# Patient Record
Sex: FEMALE | Race: WHITE | NOT HISPANIC OR LATINO | Employment: OTHER | URBAN - METROPOLITAN AREA
[De-identification: names, ages, dates, MRNs, and addresses within clinical notes are randomized per-mention and may not be internally consistent; named-entity substitution may affect disease eponyms.]

---

## 2017-06-17 ENCOUNTER — HOSPITAL ENCOUNTER (EMERGENCY)
Facility: HOSPITAL | Age: 77
Discharge: HOME | End: 2017-06-17
Attending: SURGERY
Payer: MEDICARE

## 2017-06-17 VITALS
HEART RATE: 65 BPM | WEIGHT: 175 LBS | BODY MASS INDEX: 27.47 KG/M2 | SYSTOLIC BLOOD PRESSURE: 168 MMHG | DIASTOLIC BLOOD PRESSURE: 74 MMHG | TEMPERATURE: 98 F | HEIGHT: 67 IN
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
## 2017-06-17 PROCEDURE — 96372 THER/PROPH/DIAG INJ SC/IM: CPT

## 2017-06-17 PROCEDURE — 99284 EMERGENCY DEPT VISIT MOD MDM: CPT | Mod: 25

## 2017-06-17 PROCEDURE — 63600175 PHARM REV CODE 636 W HCPCS: Performed by: SURGERY

## 2017-06-17 PROCEDURE — 25000003 PHARM REV CODE 250: Performed by: SURGERY

## 2017-06-17 RX ORDER — CYCLOBENZAPRINE HCL 10 MG
10 TABLET ORAL 3 TIMES DAILY PRN
Qty: 10 TABLET | Refills: 0 | Status: SHIPPED | OUTPATIENT
Start: 2017-06-17 | End: 2017-06-22

## 2017-06-17 RX ORDER — HYDROCODONE BITARTRATE AND ACETAMINOPHEN 5; 325 MG/1; MG/1
1 TABLET ORAL
Status: COMPLETED | OUTPATIENT
Start: 2017-06-17 | End: 2017-06-17

## 2017-06-17 RX ORDER — KETOROLAC TROMETHAMINE 30 MG/ML
30 INJECTION, SOLUTION INTRAMUSCULAR; INTRAVENOUS
Status: COMPLETED | OUTPATIENT
Start: 2017-06-17 | End: 2017-06-17

## 2017-06-17 RX ORDER — KETOROLAC TROMETHAMINE 10 MG/1
10 TABLET, FILM COATED ORAL EVERY 6 HOURS PRN
Qty: 15 TABLET | Refills: 0 | OUTPATIENT
Start: 2017-06-17 | End: 2020-08-27

## 2017-06-17 RX ADMIN — HYDROCODONE BITARTRATE AND ACETAMINOPHEN 1 TABLET: 5; 325 TABLET ORAL at 03:06

## 2017-06-17 RX ADMIN — KETOROLAC TROMETHAMINE 30 MG: 30 INJECTION, SOLUTION INTRAMUSCULAR at 03:06

## 2017-06-17 NOTE — ED PROVIDER NOTES
[facility]                                        June 17, 2017                   Chief Complaint  76 y.o. female with Fall (tripped and fell, states she hurts all over)    History of Present Illness  Giselle Rios presents to the emergency room with left hip pain today  Patient had a slip and fall last night with a headache and left hip pain after  Patient on exam has a normal left hip with no bruising or leg shortening now  Patient has good distal pulses and capillary refill, neurovascularly intact now  Pt denies any loss of consciousness; alert and oriented ×3 with GCS 15  Patient is able ambulate without difficulty, stable on presentation now    The history is provided by the patient  Medical history: HLD, hypothyroidism, obesity  Surgical history: Appendectomy, cervical fusion, hysterectomy  No Known Allergies     Review of Systems and Physical Exam     Review of Systems  -- Constitution - no fever, denies fatigue, no weakness, no chills  -- Eyes - no tearing or redness, no visual disturbance  -- Ear, Nose - no tinnitus or earache, no nasal congestion or discharge  -- Mouth,Throat - no sore throat, no toothache, normal voice, normal swallowing  -- Respiratory - denies cough and congestion, no shortness of breath, no ALANIS  -- Cardiovascular - denies chest pain, no palpitations, denies claudication  -- Gastrointestinal - denies abdominal pain, nausea, vomiting, or diarrhea  -- Genitourinary - no dysuria, no denies flank pain, no hematuria or frequency   -- Musculoskeletal - left hip pain after the fall yesterday  -- Neurological - headache, denies weakness or seizure; no LOC  -- Skin - denies pallor, rash, or changes in skin. no hives or welts noted    Vital Signs  -- Her oral temperature is 97.8 °F (36.6 °C).   -- Her blood pressure is 168/74 and her pulse is 65.      Physical Exam  -- Nursing note and vitals reviewed  -- Head: Atraumatic. Normocephalic. No obvious abnormality  -- Eyes:  Pupils are equal and reactive to light. Normal conjunctiva and lids  -- Neck: Normal range of motion. Neck supple. No masses, trachea midline  -- Cardiac: Normal rate, regular rhythm and normal heart sounds  -- Pulmonary: Normal respiratory effort, breath sounds clear to auscultation  -- Abdominal: Soft, no tenderness. Normal bowel sounds. Normal liver edge  -- Musculoskeletal: Normal range of motion, no effusions. Joints stable   -- Neurological: No focal deficits. Showed good interaction with staff  -- Vascular: Posterior tibial, dorsalis pedis and radial pulses 2+ bilaterally      Emergency Room Course     Treatment and Evaluation  -- The CT of the head performed in the ER today was negative for acute pathology   -- C-spine x-rays showed no evidence of acute fracture or dislocation   -- Chest x-ray showed no infiltrate and showed no acute pathology   -- Left hip and pelvis x-ray showed no evidence of fracture or dislocation      Diagnosis  -- The encounter diagnosis was Fall.    Disposition and Plan  -- Disposition: home  -- Condition: stable  -- Follow-up: Patient to follow up with Rina Melara MD in 1-2 days.  -- I advised the patient that we have found no life threatening condition today  -- At this time, I believe the patient is clinically stable for discharge.   -- The patient acknowledges that close follow up with a MD is required   -- Patient agrees to comply with all instruction and direction given in the ER    This note is dictated on Dragon Natural Speaking word recognition program.  There are word recognition mistakes that are occasionally missed on review.           Les Livingston MD  06/17/17 7445

## 2017-06-27 ENCOUNTER — LAB VISIT (OUTPATIENT)
Dept: LAB | Facility: HOSPITAL | Age: 77
End: 2017-06-27
Attending: FAMILY MEDICINE
Payer: MEDICARE

## 2017-06-27 DIAGNOSIS — E03.9 UNSPECIFIED HYPOTHYROIDISM: ICD-10-CM

## 2017-06-27 DIAGNOSIS — E78.00 PURE HYPERCHOLESTEROLEMIA: Primary | ICD-10-CM

## 2017-06-27 LAB
ALBUMIN SERPL BCP-MCNC: 3.5 G/DL
ALP SERPL-CCNC: 71 U/L
ALT SERPL W/O P-5'-P-CCNC: 14 U/L
ANION GAP SERPL CALC-SCNC: 8 MMOL/L
AST SERPL-CCNC: 23 U/L
BILIRUB SERPL-MCNC: 0.4 MG/DL
BUN SERPL-MCNC: 20 MG/DL
CALCIUM SERPL-MCNC: 9.4 MG/DL
CHLORIDE SERPL-SCNC: 107 MMOL/L
CHOLEST/HDLC SERPL: 4.7 {RATIO}
CO2 SERPL-SCNC: 30 MMOL/L
CREAT SERPL-MCNC: 0.8 MG/DL
ERYTHROCYTE [DISTWIDTH] IN BLOOD BY AUTOMATED COUNT: 12.9 %
EST. GFR  (AFRICAN AMERICAN): >60 ML/MIN/1.73 M^2
EST. GFR  (NON AFRICAN AMERICAN): >60 ML/MIN/1.73 M^2
GLUCOSE SERPL-MCNC: 91 MG/DL
HCT VFR BLD AUTO: 41.2 %
HDL/CHOLESTEROL RATIO: 21.2 %
HDLC SERPL-MCNC: 217 MG/DL
HDLC SERPL-MCNC: 46 MG/DL
HGB BLD-MCNC: 13.1 G/DL
LDLC SERPL CALC-MCNC: 146.4 MG/DL
MCH RBC QN AUTO: 31.3 PG
MCHC RBC AUTO-ENTMCNC: 31.8 %
MCV RBC AUTO: 99 FL
NONHDLC SERPL-MCNC: 171 MG/DL
PLATELET # BLD AUTO: 189 K/UL
PMV BLD AUTO: 10.8 FL
POTASSIUM SERPL-SCNC: 4 MMOL/L
PROT SERPL-MCNC: 7.1 G/DL
RBC # BLD AUTO: 4.18 M/UL
SODIUM SERPL-SCNC: 145 MMOL/L
T4 FREE SERPL-MCNC: 1.01 NG/DL
TRIGL SERPL-MCNC: 123 MG/DL
TSH SERPL DL<=0.005 MIU/L-ACNC: 6.17 UIU/ML
WBC # BLD AUTO: 5.58 K/UL

## 2017-06-27 PROCEDURE — 80053 COMPREHEN METABOLIC PANEL: CPT

## 2017-06-27 PROCEDURE — 80061 LIPID PANEL: CPT

## 2017-06-27 PROCEDURE — 85027 COMPLETE CBC AUTOMATED: CPT

## 2017-06-27 PROCEDURE — 36415 COLL VENOUS BLD VENIPUNCTURE: CPT

## 2017-06-27 PROCEDURE — 84439 ASSAY OF FREE THYROXINE: CPT

## 2017-06-27 PROCEDURE — 84443 ASSAY THYROID STIM HORMONE: CPT

## 2017-10-19 ENCOUNTER — LAB VISIT (OUTPATIENT)
Dept: LAB | Facility: HOSPITAL | Age: 77
End: 2017-10-19
Attending: NURSE PRACTITIONER
Payer: MEDICARE

## 2017-10-19 DIAGNOSIS — I10 HTN (HYPERTENSION): Primary | ICD-10-CM

## 2017-10-19 LAB
CHOLEST SERPL-MCNC: 238 MG/DL
CHOLEST/HDLC SERPL: 4.7 {RATIO}
HDLC SERPL-MCNC: 51 MG/DL
HDLC SERPL: 21.4 %
LDLC SERPL CALC-MCNC: 159.8 MG/DL
NONHDLC SERPL-MCNC: 187 MG/DL
TRIGL SERPL-MCNC: 136 MG/DL
TSH SERPL DL<=0.005 MIU/L-ACNC: 0.82 UIU/ML

## 2017-10-19 PROCEDURE — 84443 ASSAY THYROID STIM HORMONE: CPT

## 2017-10-19 PROCEDURE — 36415 COLL VENOUS BLD VENIPUNCTURE: CPT

## 2017-10-19 PROCEDURE — 80061 LIPID PANEL: CPT

## 2017-12-29 ENCOUNTER — HOSPITAL ENCOUNTER (OUTPATIENT)
Dept: RADIOLOGY | Facility: HOSPITAL | Age: 77
Discharge: HOME | End: 2017-12-29
Attending: NURSE PRACTITIONER
Payer: MEDICARE

## 2017-12-29 VITALS — WEIGHT: 175 LBS | BODY MASS INDEX: 27.47 KG/M2 | HEIGHT: 67 IN

## 2017-12-29 DIAGNOSIS — Z12.31 ENCOUNTER FOR SCREENING MAMMOGRAM FOR MALIGNANT NEOPLASM OF BREAST: ICD-10-CM

## 2017-12-29 PROCEDURE — 77063 BREAST TOMOSYNTHESIS BI: CPT | Mod: 26,,, | Performed by: RADIOLOGY

## 2017-12-29 PROCEDURE — 77067 SCR MAMMO BI INCL CAD: CPT | Mod: 26,,, | Performed by: RADIOLOGY

## 2017-12-29 PROCEDURE — 77067 SCR MAMMO BI INCL CAD: CPT | Mod: TC

## 2018-02-05 ENCOUNTER — HOSPITAL ENCOUNTER (EMERGENCY)
Facility: HOSPITAL | Age: 78
Discharge: HOME | End: 2018-02-05
Attending: SURGERY
Payer: MEDICARE

## 2018-02-05 VITALS
WEIGHT: 175 LBS | HEART RATE: 63 BPM | DIASTOLIC BLOOD PRESSURE: 61 MMHG | RESPIRATION RATE: 15 BRPM | OXYGEN SATURATION: 99 % | SYSTOLIC BLOOD PRESSURE: 150 MMHG | BODY MASS INDEX: 27.41 KG/M2 | TEMPERATURE: 96 F

## 2018-02-05 DIAGNOSIS — R07.89 CHEST WALL PAIN: ICD-10-CM

## 2018-02-05 DIAGNOSIS — M79.10 MUSCLE PAIN: ICD-10-CM

## 2018-02-05 LAB
ALBUMIN SERPL BCP-MCNC: 3.5 G/DL
ALP SERPL-CCNC: 65 U/L
ALT SERPL W/O P-5'-P-CCNC: 14 U/L
ANION GAP SERPL CALC-SCNC: 8 MMOL/L
APTT BLDCRRT: 25.6 SEC
AST SERPL-CCNC: 20 U/L
BASOPHILS # BLD AUTO: 0.04 K/UL
BASOPHILS NFR BLD: 0.8 %
BILIRUB SERPL-MCNC: 0.4 MG/DL
BNP SERPL-MCNC: 104 PG/ML
BUN SERPL-MCNC: 22 MG/DL
CALCIUM SERPL-MCNC: 8.7 MG/DL
CHLORIDE SERPL-SCNC: 108 MMOL/L
CK MB SERPL-MCNC: 2.2 NG/ML
CK MB SERPL-RTO: 1.5 %
CK SERPL-CCNC: 146 U/L
CK SERPL-CCNC: 146 U/L
CO2 SERPL-SCNC: 29 MMOL/L
CREAT SERPL-MCNC: 0.7 MG/DL
D DIMER PPP IA.FEU-MCNC: 0.55 MG/L FEU
DIFFERENTIAL METHOD: ABNORMAL
EOSINOPHIL # BLD AUTO: 0.1 K/UL
EOSINOPHIL NFR BLD: 2.5 %
ERYTHROCYTE [DISTWIDTH] IN BLOOD BY AUTOMATED COUNT: 12.9 %
EST. GFR  (AFRICAN AMERICAN): >60 ML/MIN/1.73 M^2
EST. GFR  (NON AFRICAN AMERICAN): >60 ML/MIN/1.73 M^2
GLUCOSE SERPL-MCNC: 90 MG/DL
HCT VFR BLD AUTO: 37.3 %
HGB BLD-MCNC: 12 G/DL
INR PPP: 1
LYMPHOCYTES # BLD AUTO: 1.9 K/UL
LYMPHOCYTES NFR BLD: 36.5 %
MCH RBC QN AUTO: 31.3 PG
MCHC RBC AUTO-ENTMCNC: 32.2 G/DL
MCV RBC AUTO: 97 FL
MONOCYTES # BLD AUTO: 0.6 K/UL
MONOCYTES NFR BLD: 11.1 %
NEUTROPHILS # BLD AUTO: 2.5 K/UL
NEUTROPHILS NFR BLD: 49.1 %
PLATELET # BLD AUTO: 161 K/UL
PMV BLD AUTO: 10.7 FL
POTASSIUM SERPL-SCNC: 4.1 MMOL/L
PROT SERPL-MCNC: 6.7 G/DL
PROTHROMBIN TIME: 10.3 SEC
RBC # BLD AUTO: 3.84 M/UL
SODIUM SERPL-SCNC: 145 MMOL/L
TROPONIN I SERPL DL<=0.01 NG/ML-MCNC: <0.006 NG/ML
WBC # BLD AUTO: 5.13 K/UL

## 2018-02-05 PROCEDURE — 83880 ASSAY OF NATRIURETIC PEPTIDE: CPT

## 2018-02-05 PROCEDURE — 36415 COLL VENOUS BLD VENIPUNCTURE: CPT

## 2018-02-05 PROCEDURE — 93005 ELECTROCARDIOGRAM TRACING: CPT

## 2018-02-05 PROCEDURE — 25000003 PHARM REV CODE 250: Performed by: EMERGENCY MEDICINE

## 2018-02-05 PROCEDURE — 99284 EMERGENCY DEPT VISIT MOD MDM: CPT | Mod: 25

## 2018-02-05 PROCEDURE — 85025 COMPLETE CBC W/AUTO DIFF WBC: CPT

## 2018-02-05 PROCEDURE — 25000003 PHARM REV CODE 250: Performed by: SURGERY

## 2018-02-05 PROCEDURE — 82550 ASSAY OF CK (CPK): CPT

## 2018-02-05 PROCEDURE — 85610 PROTHROMBIN TIME: CPT

## 2018-02-05 PROCEDURE — 85379 FIBRIN DEGRADATION QUANT: CPT

## 2018-02-05 PROCEDURE — 85730 THROMBOPLASTIN TIME PARTIAL: CPT

## 2018-02-05 PROCEDURE — 93010 ELECTROCARDIOGRAM REPORT: CPT | Mod: ,,, | Performed by: INTERNAL MEDICINE

## 2018-02-05 PROCEDURE — 25500020 PHARM REV CODE 255: Performed by: SURGERY

## 2018-02-05 PROCEDURE — 80053 COMPREHEN METABOLIC PANEL: CPT

## 2018-02-05 PROCEDURE — 84484 ASSAY OF TROPONIN QUANT: CPT

## 2018-02-05 PROCEDURE — 82553 CREATINE MB FRACTION: CPT

## 2018-02-05 RX ORDER — NAPROXEN SODIUM 220 MG/1
81 TABLET, FILM COATED ORAL
Status: COMPLETED | OUTPATIENT
Start: 2018-02-05 | End: 2018-02-05

## 2018-02-05 RX ORDER — CYCLOBENZAPRINE HCL 10 MG
10 TABLET ORAL 3 TIMES DAILY PRN
Qty: 10 TABLET | Refills: 0 | Status: SHIPPED | OUTPATIENT
Start: 2018-02-05 | End: 2018-02-10

## 2018-02-05 RX ORDER — CYCLOBENZAPRINE HCL 10 MG
10 TABLET ORAL
Status: COMPLETED | OUTPATIENT
Start: 2018-02-05 | End: 2018-02-05

## 2018-02-05 RX ADMIN — ASPIRIN 81 MG 81 MG: 81 TABLET ORAL at 05:02

## 2018-02-05 RX ADMIN — CYCLOBENZAPRINE HYDROCHLORIDE 10 MG: 10 TABLET, FILM COATED ORAL at 08:02

## 2018-02-05 RX ADMIN — IOHEXOL 75 ML: 350 INJECTION, SOLUTION INTRAVENOUS at 07:02

## 2018-02-05 NOTE — ED PROVIDER NOTES
Encounter Date: 2/5/2018       History     Chief Complaint   -- Muscle Pain     HPI   Giselle Rios is a 77 y.o. female presents with left chest wall pain 4 days  Pt states she is having burning pain in the left chest wall, worse with activity  Patient denies any trauma or fall, patient denies any acute injury on interview  Pt states any deep breath or movement exacerbates her left chest wall pain  Patient is normal sinus rhythm on EKG without ST changes noted in the ER  Patient has no cardiac history, states the pain is entirely movement oriented    The history is provided by the patient  Medical history: HLD, hypothyroidism, obesity  Surgical history: Appendectomy, cervical fusion, hysterectomy  No Known Allergies     Review of Systems and Physical Exam      Review of Systems  -- Constitution - no fever, denies fatigue, no weakness, no chills  -- Eyes - no tearing or redness, no visual disturbance  -- Ear, Nose - no tinnitus or earache, no nasal congestion or discharge  -- Mouth,Throat - no sore throat, no toothache, normal voice, normal swallowing  -- Respiratory - denies cough and congestion, no shortness of breath, no ALANIS  -- Cardiovascular - chest pain, no palpitations, denies claudication  -- Gastrointestinal - denies abdominal pain, nausea, vomiting, or diarrhea  -- Musculoskeletal - denies back pain, negative for myalgias and arthralgias   -- Neurological - no headache, denies weakness or seizure; no LOC  -- Skin - denies pallor, rash, or changes in skin. no hives or welts noted     BP Pulse Temp   (!) 176/76 65 96.3 °F (35.7 °C)     Physical Exam   -- Nursing note and vitals reviewed  -- Head: Atraumatic. Normocephalic. No obvious abnormality  -- Eyes: Pupils are equal and reactive to light. Normal conjunctiva and lids  -- Cardiac: Normal rate, regular rhythm and normal heart sounds  -- Pulmonary: Normal respiratory effort, breath sounds clear to auscultation  -- Abdominal: Soft, no tenderness. Normal  bowel sounds. Normal liver edge  -- Musculoskeletal: Normal range of motion, no effusions. Joints stable   -- Neurological: No focal deficits. Showed good interaction with staff  -- Vascular: Posterior tibial, dorsalis pedis and radial pulses 2+ bilaterally       ED Course   Procedures  Labs Reviewed   COMPREHENSIVE METABOLIC PANEL   CBC W/ AUTO DIFFERENTIAL   TROPONIN I   CK   CK-MB   B-TYPE NATRIURETIC PEPTIDE   PROTIME-INR   APTT   D DIMER, QUANTITATIVE                               ED Course      Clinical Impression:   Diagnoses of Muscle pain and Chest wall pain were pertinent to this visit.    Disposition:   Disposition: Discharged  Condition: Stable                        Solo Neff MD  02/05/18 2004

## 2019-01-31 ENCOUNTER — HOSPITAL ENCOUNTER (OUTPATIENT)
Dept: RADIOLOGY | Facility: HOSPITAL | Age: 79
Discharge: HOME | End: 2019-01-31
Attending: NURSE PRACTITIONER
Payer: MEDICARE

## 2019-01-31 VITALS — BODY MASS INDEX: 27.47 KG/M2 | HEIGHT: 67 IN | WEIGHT: 175 LBS

## 2019-01-31 DIAGNOSIS — Z12.31 ENCOUNTER FOR SCREENING MAMMOGRAM FOR MALIGNANT NEOPLASM OF BREAST: ICD-10-CM

## 2019-01-31 PROCEDURE — 77067 MAMMO DIGITAL SCREENING BILAT WITH TOMOSYNTHESIS_CAD: ICD-10-PCS | Mod: 26,,, | Performed by: RADIOLOGY

## 2019-01-31 PROCEDURE — 77067 SCR MAMMO BI INCL CAD: CPT | Mod: TC

## 2019-01-31 PROCEDURE — 77067 SCR MAMMO BI INCL CAD: CPT | Mod: 26,,, | Performed by: RADIOLOGY

## 2019-01-31 PROCEDURE — 77063 MAMMO DIGITAL SCREENING BILAT WITH TOMOSYNTHESIS_CAD: ICD-10-PCS | Mod: 26,,, | Performed by: RADIOLOGY

## 2019-01-31 PROCEDURE — 77063 BREAST TOMOSYNTHESIS BI: CPT | Mod: 26,,, | Performed by: RADIOLOGY

## 2019-09-30 ENCOUNTER — HOSPITAL ENCOUNTER (EMERGENCY)
Facility: HOSPITAL | Age: 79
Discharge: HOME | End: 2019-09-30
Attending: SURGERY
Payer: MEDICARE

## 2019-09-30 VITALS
HEART RATE: 51 BPM | BODY MASS INDEX: 29.45 KG/M2 | RESPIRATION RATE: 18 BRPM | HEIGHT: 67 IN | SYSTOLIC BLOOD PRESSURE: 149 MMHG | TEMPERATURE: 97 F | OXYGEN SATURATION: 98 % | WEIGHT: 187.63 LBS | DIASTOLIC BLOOD PRESSURE: 65 MMHG

## 2019-09-30 DIAGNOSIS — R42 DIZZINESS: ICD-10-CM

## 2019-09-30 LAB
ALBUMIN SERPL BCP-MCNC: 3.8 G/DL (ref 3.5–5.2)
ALP SERPL-CCNC: 71 U/L (ref 55–135)
ALT SERPL W/O P-5'-P-CCNC: 12 U/L (ref 10–44)
ANION GAP SERPL CALC-SCNC: 11 MMOL/L (ref 8–16)
APTT BLDCRRT: 24.5 SEC (ref 21–32)
AST SERPL-CCNC: 20 U/L (ref 10–40)
BASOPHILS # BLD AUTO: 0.03 K/UL (ref 0–0.2)
BASOPHILS NFR BLD: 0.5 % (ref 0–1.9)
BILIRUB SERPL-MCNC: 0.5 MG/DL (ref 0.1–1)
BILIRUB UR QL STRIP: NEGATIVE
BNP SERPL-MCNC: 104 PG/ML (ref 0–99)
BUN SERPL-MCNC: 14 MG/DL (ref 8–23)
CALCIUM SERPL-MCNC: 9.4 MG/DL (ref 8.7–10.5)
CHLORIDE SERPL-SCNC: 105 MMOL/L (ref 95–110)
CK MB SERPL-MCNC: 1.3 NG/ML (ref 0.1–6.5)
CK MB SERPL-MCNC: 1.3 NG/ML (ref 0.1–6.5)
CK MB SERPL-RTO: 1.6 % (ref 0–5)
CK MB SERPL-RTO: 1.9 % (ref 0–5)
CK SERPL-CCNC: 70 U/L (ref 20–180)
CK SERPL-CCNC: 70 U/L (ref 20–180)
CK SERPL-CCNC: 83 U/L (ref 20–180)
CK SERPL-CCNC: 83 U/L (ref 20–180)
CLARITY UR: CLEAR
CO2 SERPL-SCNC: 27 MMOL/L (ref 23–29)
COLOR UR: YELLOW
CREAT SERPL-MCNC: 0.8 MG/DL (ref 0.5–1.4)
DIFFERENTIAL METHOD: ABNORMAL
EOSINOPHIL # BLD AUTO: 0.2 K/UL (ref 0–0.5)
EOSINOPHIL NFR BLD: 2.6 % (ref 0–8)
ERYTHROCYTE [DISTWIDTH] IN BLOOD BY AUTOMATED COUNT: 13.2 % (ref 11.5–14.5)
EST. GFR  (AFRICAN AMERICAN): >60 ML/MIN/1.73 M^2
EST. GFR  (NON AFRICAN AMERICAN): >60 ML/MIN/1.73 M^2
GLUCOSE SERPL-MCNC: 100 MG/DL (ref 70–110)
GLUCOSE UR QL STRIP: NEGATIVE
HCT VFR BLD AUTO: 41.4 % (ref 37–48.5)
HGB BLD-MCNC: 13.1 G/DL (ref 12–16)
HGB UR QL STRIP: NEGATIVE
IMM GRANULOCYTES # BLD AUTO: 0.02 K/UL (ref 0–0.04)
IMM GRANULOCYTES NFR BLD AUTO: 0.3 % (ref 0–0.5)
INR PPP: 1 (ref 0.8–1.2)
KETONES UR QL STRIP: ABNORMAL
LEUKOCYTE ESTERASE UR QL STRIP: ABNORMAL
LYMPHOCYTES # BLD AUTO: 1.8 K/UL (ref 1–4.8)
LYMPHOCYTES NFR BLD: 30.9 % (ref 18–48)
MAGNESIUM SERPL-MCNC: 2.1 MG/DL (ref 1.6–2.6)
MCH RBC QN AUTO: 30.5 PG (ref 27–31)
MCHC RBC AUTO-ENTMCNC: 31.6 G/DL (ref 32–36)
MCV RBC AUTO: 97 FL (ref 82–98)
MICROSCOPIC COMMENT: NORMAL
MONOCYTES # BLD AUTO: 0.5 K/UL (ref 0.3–1)
MONOCYTES NFR BLD: 9.1 % (ref 4–15)
NEUTROPHILS # BLD AUTO: 3.3 K/UL (ref 1.8–7.7)
NEUTROPHILS NFR BLD: 56.6 % (ref 38–73)
NITRITE UR QL STRIP: NEGATIVE
NRBC BLD-RTO: 0 /100 WBC
PH UR STRIP: 7 [PH] (ref 5–8)
PHOSPHATE SERPL-MCNC: 3.7 MG/DL (ref 2.7–4.5)
PLATELET # BLD AUTO: 179 K/UL (ref 150–350)
PMV BLD AUTO: 11.2 FL (ref 9.2–12.9)
POTASSIUM SERPL-SCNC: 4.2 MMOL/L (ref 3.5–5.1)
PROT SERPL-MCNC: 7.3 G/DL (ref 6–8.4)
PROT UR QL STRIP: NEGATIVE
PROTHROMBIN TIME: 10.3 SEC (ref 9–12.5)
RBC # BLD AUTO: 4.29 M/UL (ref 4–5.4)
SODIUM SERPL-SCNC: 143 MMOL/L (ref 136–145)
SP GR UR STRIP: 1.01 (ref 1–1.03)
TROPONIN I SERPL DL<=0.01 NG/ML-MCNC: <0.006 NG/ML (ref 0–0.03)
TROPONIN I SERPL DL<=0.01 NG/ML-MCNC: <0.006 NG/ML (ref 0–0.03)
TSH SERPL DL<=0.005 MIU/L-ACNC: 3.5 UIU/ML (ref 0.4–4)
URN SPEC COLLECT METH UR: ABNORMAL
UROBILINOGEN UR STRIP-ACNC: NEGATIVE EU/DL
WBC # BLD AUTO: 5.85 K/UL (ref 3.9–12.7)
WBC #/AREA URNS HPF: 4 /HPF (ref 0–5)

## 2019-09-30 PROCEDURE — 93010 ELECTROCARDIOGRAM REPORT: CPT | Mod: ,,, | Performed by: INTERNAL MEDICINE

## 2019-09-30 PROCEDURE — 36415 COLL VENOUS BLD VENIPUNCTURE: CPT

## 2019-09-30 PROCEDURE — 63600175 PHARM REV CODE 636 W HCPCS: Performed by: SURGERY

## 2019-09-30 PROCEDURE — 85025 COMPLETE CBC W/AUTO DIFF WBC: CPT

## 2019-09-30 PROCEDURE — 84100 ASSAY OF PHOSPHORUS: CPT

## 2019-09-30 PROCEDURE — 25000003 PHARM REV CODE 250: Performed by: SURGERY

## 2019-09-30 PROCEDURE — 81000 URINALYSIS NONAUTO W/SCOPE: CPT

## 2019-09-30 PROCEDURE — 93005 ELECTROCARDIOGRAM TRACING: CPT

## 2019-09-30 PROCEDURE — 84443 ASSAY THYROID STIM HORMONE: CPT

## 2019-09-30 PROCEDURE — 80053 COMPREHEN METABOLIC PANEL: CPT

## 2019-09-30 PROCEDURE — 99285 EMERGENCY DEPT VISIT HI MDM: CPT | Mod: 25

## 2019-09-30 PROCEDURE — 85610 PROTHROMBIN TIME: CPT

## 2019-09-30 PROCEDURE — 82550 ASSAY OF CK (CPK): CPT

## 2019-09-30 PROCEDURE — 93010 EKG 12-LEAD: ICD-10-PCS | Mod: ,,, | Performed by: INTERNAL MEDICINE

## 2019-09-30 PROCEDURE — 96361 HYDRATE IV INFUSION ADD-ON: CPT

## 2019-09-30 PROCEDURE — 83880 ASSAY OF NATRIURETIC PEPTIDE: CPT

## 2019-09-30 PROCEDURE — 83735 ASSAY OF MAGNESIUM: CPT

## 2019-09-30 PROCEDURE — 82553 CREATINE MB FRACTION: CPT

## 2019-09-30 PROCEDURE — 85730 THROMBOPLASTIN TIME PARTIAL: CPT

## 2019-09-30 PROCEDURE — 96360 HYDRATION IV INFUSION INIT: CPT

## 2019-09-30 PROCEDURE — 84484 ASSAY OF TROPONIN QUANT: CPT | Mod: 91

## 2019-09-30 RX ORDER — ONDANSETRON 4 MG/1
4 TABLET, ORALLY DISINTEGRATING ORAL
Status: COMPLETED | OUTPATIENT
Start: 2019-09-30 | End: 2019-09-30

## 2019-09-30 RX ORDER — MECLIZINE HYDROCHLORIDE 25 MG/1
25 TABLET ORAL
Status: COMPLETED | OUTPATIENT
Start: 2019-09-30 | End: 2019-09-30

## 2019-09-30 RX ORDER — MECLIZINE HYDROCHLORIDE 25 MG/1
25 TABLET ORAL 3 TIMES DAILY PRN
Qty: 20 TABLET | Refills: 0 | OUTPATIENT
Start: 2019-09-30 | End: 2020-08-27

## 2019-09-30 RX ORDER — ONDANSETRON 4 MG/1
4 TABLET, ORALLY DISINTEGRATING ORAL EVERY 8 HOURS PRN
Qty: 20 TABLET | Refills: 0 | Status: SHIPPED | OUTPATIENT
Start: 2019-09-30 | End: 2019-11-20 | Stop reason: SDUPTHER

## 2019-09-30 RX ADMIN — SODIUM CHLORIDE 500 ML: 0.9 INJECTION, SOLUTION INTRAVENOUS at 02:09

## 2019-09-30 RX ADMIN — MECLIZINE HYDROCHLORIDE 25 MG: 25 TABLET ORAL at 04:09

## 2019-09-30 RX ADMIN — SODIUM CHLORIDE 500 ML: 0.9 INJECTION, SOLUTION INTRAVENOUS at 12:09

## 2019-09-30 RX ADMIN — ONDANSETRON 4 MG: 4 TABLET, ORALLY DISINTEGRATING ORAL at 04:09

## 2019-09-30 NOTE — ED PROVIDER NOTES
[facility]                                                 Chief Complaint  79 y.o. female with Dizziness    History of Present Illness  Giselle Rios presents to the emergency room with dizziness today  Patient has had on again off again dizziness for years with no diagnosis  Patient was told that she had possible inner ear dizziness 20 years ago  Patient presents today after the death of her brother feeling dizzy now  Patient is alert and appropriate with no signs of neuro deficit noted  Patient states she feels like the room is spinning, worse sitting up  The patient denies any chest pain, neuro exam, consistent with vertigo    The history is provided by the patient   device was not used during this ER visit  Medical history: HLD, hypothyroidism, obesity  Surgical history: Appendectomy, cervical fusion, hysterectomy  No Known Allergies     I have reviewed all of this patient's past medical, surgical, family, and social   histories as well as active allergies and medications documented in the  electronic medical record    Review of Systems and Physical Exam      Review of Systems  -- Constitution - no fever, denies fatigue, no weakness, no chills  -- Eyes - no tearing or redness, no visual disturbance  -- Ear, Nose - no tinnitus or earache, no nasal congestion or discharge  -- Mouth,Throat - no sore throat, no toothache, normal voice, normal swallowing  -- Respiratory - denies cough and congestion, no shortness of breath, no ALANIS  -- Cardiovascular - denies chest pain, no palpitations, denies claudication  -- Gastrointestinal - denies abdominal pain, nausea, vomiting, or diarrhea  -- Genitourinary - no dysuria, denies flank pain, no hematuria, no STD risk  -- Musculoskeletal - denies back pain, negative for trauma or injury  -- Neurological - dizziness, no headache, denies weakness or seizure; no LOC  -- Skin - denies pallor, rash, or changes in skin. no hives or welts  noted  -- Psychiatric - Denies SI or HI, no psychosis or fractured thought noted     Vital Signs  Her oral temperature is 96.9 °F (36.1 °C).   Her blood pressure is 136/61 and her pulse is 52   Her respiration is 19 and oxygen saturation is 97%.     Physical Exam  -- Nursing note and vitals reviewed  -- Constitutional: Appears well-developed and well-nourished  -- Head: Atraumatic. Normocephalic. No obvious abnormality  -- Eyes: Pupils are equal and reactive to light. Normal conjunctiva and lids  -- Nose: Nose normal in appearance, nares grossly normal. No discharge  -- Throat: Mucous membranes moist, pharynx normal, normal tonsils. No lesions   -- Ears: External ears and TM normal bilaterally. Normal hearing and no drainage  -- Neck: Normal range of motion. Neck supple. No masses, trachea midline  -- Cardiac: Normal rate, regular rhythm and normal heart sounds  -- Pulmonary: Normal respiratory effort, breath sounds clear to auscultation  -- Abdominal: Soft, no tenderness. Normal bowel sounds. Normal liver edge  -- Musculoskeletal: Normal range of motion, no effusions. Joints stable   -- Neurological: No focal deficits. Showed good interaction with staff  -- Vascular: Posterior tibial, dorsalis pedis and radial pulses 2+ bilaterally      Emergency Room Course      Lab Results     K 4.2      CO2 27   BUN 14   CREATININE 0.8      ALKPHOS 71   AST 20   ALT 12   BILITOT 0.5   ALBUMIN 3.8   PROT 7.3   WBC 5.85   HGB 13.1   HCT 41.4      CPK 70   CPK 70   CPKMB 1.3   TROPONINI <0.006   INR 1.0    (H)   MG 2.1   TSH 3.496     EKG  -- The EKG findings today were without concerning findings from baseline     Radiology  -- The CT of the head performed in the ER today was negative for acute pathology     Medications Given  meclizine tablet 25 mg (has no administration in time range)   ondansetron disintegrating tablet 4 mg (has no administration in time range)   sodium chloride 0.9% bolus 500  mL (0 mLs Intravenous Stopped 19 1347)   sodium chloride 0.9% bolus 500 mL (0 mLs Intravenous Stopped 19 1629)     ED Physician Management  -- Diagnosis management comments: 79 y.o. female with dizziness issues today  -- patient with dizziness issues, patient has long history of vertigo related symptoms  -- patient's brother recently , this is center and high anxiety and dizziness after  -- patient has a normal neuro exam with a normal head CT in the ER this afternoon  -- patient denies any chest pain or shortness of breath, normal physical exam  -- patient feels much better after meclizine and Zofran given in the ER  -- as a precaution stable EKG with 2 sets of negative troponins the ER today  -- patient be treated for vertigo, suggested Neurology follow-up on discharge    Diagnosis  -- The encounter diagnosis was Dizziness.    Disposition and Plan  -- Disposition: home  -- Condition: stable  -- Follow-up: Patient to follow up with Rina Melara MD in 1-2 days.  -- I advised the patient that we have found no life threatening condition today  -- At this time, I believe the patient is clinically stable for discharge.   -- The patient acknowledges that close follow up with a MD is required   -- Patient agrees to comply with all instruction and direction given in the ER    This note is dictated on M*Modal word recognition program.  There are word recognition mistakes that are occasionally missed on review.         Les Livingston MD  19 [id number]

## 2019-09-30 NOTE — ED TRIAGE NOTES
79 y.o. female presents to ER   Chief Complaint   Patient presents with    Dizziness   Pt reports dizziness and nausea onset yesterday. No acute distress noted.

## 2019-09-30 NOTE — ED NOTES
Pt given urine speciman cup, arlene soap towelettewipe, and instructions for [facility]; understanding verbalized.

## 2019-10-21 ENCOUNTER — LAB VISIT (OUTPATIENT)
Dept: LAB | Facility: HOSPITAL | Age: 79
End: 2019-10-21
Attending: NURSE PRACTITIONER
Payer: MEDICARE

## 2019-10-21 DIAGNOSIS — E03.9 HYPOTHYROIDISM: Primary | ICD-10-CM

## 2019-10-21 LAB
T4 FREE SERPL-MCNC: 1.1 NG/DL (ref 0.71–1.51)
TSH SERPL DL<=0.005 MIU/L-ACNC: 5.36 UIU/ML (ref 0.4–4)

## 2019-10-21 PROCEDURE — 36415 COLL VENOUS BLD VENIPUNCTURE: CPT

## 2019-10-21 PROCEDURE — 84443 ASSAY THYROID STIM HORMONE: CPT

## 2019-10-21 PROCEDURE — 84439 ASSAY OF FREE THYROXINE: CPT

## 2019-11-20 ENCOUNTER — HOSPITAL ENCOUNTER (EMERGENCY)
Facility: HOSPITAL | Age: 79
Discharge: ANOTHER HOSPITAL | End: 2019-11-20
Attending: EMERGENCY MEDICINE
Payer: MEDICARE

## 2019-11-20 ENCOUNTER — HOSPITAL ENCOUNTER (EMERGENCY)
Facility: HOSPITAL | Age: 79
Discharge: HOME | End: 2019-11-20
Attending: EMERGENCY MEDICINE
Payer: MEDICARE

## 2019-11-20 VITALS
HEIGHT: 68 IN | SYSTOLIC BLOOD PRESSURE: 123 MMHG | HEART RATE: 57 BPM | OXYGEN SATURATION: 99 % | RESPIRATION RATE: 15 BRPM | BODY MASS INDEX: 28.04 KG/M2 | TEMPERATURE: 98 F | DIASTOLIC BLOOD PRESSURE: 57 MMHG | WEIGHT: 185 LBS

## 2019-11-20 VITALS
TEMPERATURE: 98 F | OXYGEN SATURATION: 98 % | DIASTOLIC BLOOD PRESSURE: 70 MMHG | RESPIRATION RATE: 16 BRPM | HEART RATE: 62 BPM | WEIGHT: 189 LBS | SYSTOLIC BLOOD PRESSURE: 145 MMHG | BODY MASS INDEX: 29.6 KG/M2

## 2019-11-20 DIAGNOSIS — I62.00 SUBDURAL BLEEDING: Primary | ICD-10-CM

## 2019-11-20 DIAGNOSIS — I62.9 INTRACRANIAL HEMORRHAGE: ICD-10-CM

## 2019-11-20 DIAGNOSIS — S06.5XAA SUBDURAL HEMATOMA: Primary | ICD-10-CM

## 2019-11-20 PROBLEM — H26.9 CATARACT: Status: ACTIVE | Noted: 2019-11-20

## 2019-11-20 PROBLEM — E03.9 HYPOTHYROIDISM: Status: ACTIVE | Noted: 2019-11-20

## 2019-11-20 PROBLEM — E78.5 DYSLIPIDEMIA: Status: ACTIVE | Noted: 2019-11-20

## 2019-11-20 LAB
ANION GAP SERPL CALC-SCNC: 9 MMOL/L (ref 8–16)
APTT BLDCRRT: 25 SEC (ref 21–32)
BASOPHILS # BLD AUTO: 0.06 K/UL (ref 0–0.2)
BASOPHILS NFR BLD: 1 % (ref 0–1.9)
BUN SERPL-MCNC: 17 MG/DL (ref 8–23)
CALCIUM SERPL-MCNC: 9.1 MG/DL (ref 8.7–10.5)
CHLORIDE SERPL-SCNC: 106 MMOL/L (ref 95–110)
CO2 SERPL-SCNC: 26 MMOL/L (ref 23–29)
CREAT SERPL-MCNC: 0.7 MG/DL (ref 0.5–1.4)
DIFFERENTIAL METHOD: ABNORMAL
EOSINOPHIL # BLD AUTO: 0.1 K/UL (ref 0–0.5)
EOSINOPHIL NFR BLD: 2.4 % (ref 0–8)
ERYTHROCYTE [DISTWIDTH] IN BLOOD BY AUTOMATED COUNT: 12.9 % (ref 11.5–14.5)
EST. GFR  (AFRICAN AMERICAN): >60 ML/MIN/1.73 M^2
EST. GFR  (NON AFRICAN AMERICAN): >60 ML/MIN/1.73 M^2
GLUCOSE SERPL-MCNC: 100 MG/DL (ref 70–110)
HCT VFR BLD AUTO: 42.2 % (ref 37–48.5)
HGB BLD-MCNC: 12.9 G/DL (ref 12–16)
IMM GRANULOCYTES # BLD AUTO: 0.01 K/UL (ref 0–0.04)
IMM GRANULOCYTES NFR BLD AUTO: 0.2 % (ref 0–0.5)
INR PPP: 1 (ref 0.8–1.2)
LYMPHOCYTES # BLD AUTO: 1.4 K/UL (ref 1–4.8)
LYMPHOCYTES NFR BLD: 23.9 % (ref 18–48)
MCH RBC QN AUTO: 30.7 PG (ref 27–31)
MCHC RBC AUTO-ENTMCNC: 30.6 G/DL (ref 32–36)
MCV RBC AUTO: 101 FL (ref 82–98)
MONOCYTES # BLD AUTO: 0.5 K/UL (ref 0.3–1)
MONOCYTES NFR BLD: 9.1 % (ref 4–15)
NEUTROPHILS # BLD AUTO: 3.7 K/UL (ref 1.8–7.7)
NEUTROPHILS NFR BLD: 63.4 % (ref 38–73)
NRBC BLD-RTO: 0 /100 WBC
PLATELET # BLD AUTO: 189 K/UL (ref 150–350)
PMV BLD AUTO: 11.1 FL (ref 9.2–12.9)
POTASSIUM SERPL-SCNC: 4.5 MMOL/L (ref 3.5–5.1)
PROTHROMBIN TIME: 10.1 SEC (ref 9–12.5)
RBC # BLD AUTO: 4.2 M/UL (ref 4–5.4)
SODIUM SERPL-SCNC: 141 MMOL/L (ref 136–145)
WBC # BLD AUTO: 5.85 K/UL (ref 3.9–12.7)

## 2019-11-20 PROCEDURE — 99285 EMERGENCY DEPT VISIT HI MDM: CPT | Mod: 25,27

## 2019-11-20 PROCEDURE — 25000003 PHARM REV CODE 250: Performed by: PHYSICIAN ASSISTANT

## 2019-11-20 PROCEDURE — 90471 IMMUNIZATION ADMIN: CPT | Performed by: EMERGENCY MEDICINE

## 2019-11-20 PROCEDURE — 99285 EMERGENCY DEPT VISIT HI MDM: CPT | Mod: ,,, | Performed by: PHYSICIAN ASSISTANT

## 2019-11-20 PROCEDURE — 80048 BASIC METABOLIC PNL TOTAL CA: CPT

## 2019-11-20 PROCEDURE — 93005 ELECTROCARDIOGRAM TRACING: CPT

## 2019-11-20 PROCEDURE — 93010 EKG 12-LEAD: ICD-10-PCS | Mod: ,,, | Performed by: INTERNAL MEDICINE

## 2019-11-20 PROCEDURE — 63600175 PHARM REV CODE 636 W HCPCS: Performed by: EMERGENCY MEDICINE

## 2019-11-20 PROCEDURE — 25000003 PHARM REV CODE 250: Performed by: EMERGENCY MEDICINE

## 2019-11-20 PROCEDURE — 85610 PROTHROMBIN TIME: CPT

## 2019-11-20 PROCEDURE — 85025 COMPLETE CBC W/AUTO DIFF WBC: CPT

## 2019-11-20 PROCEDURE — 99285 EMERGENCY DEPT VISIT HI MDM: CPT | Mod: 25

## 2019-11-20 PROCEDURE — 99285 PR EMERGENCY DEPT VISIT,LEVEL V: ICD-10-PCS | Mod: ,,, | Performed by: PHYSICIAN ASSISTANT

## 2019-11-20 PROCEDURE — 85730 THROMBOPLASTIN TIME PARTIAL: CPT

## 2019-11-20 PROCEDURE — 90715 TDAP VACCINE 7 YRS/> IM: CPT | Performed by: EMERGENCY MEDICINE

## 2019-11-20 PROCEDURE — 93010 ELECTROCARDIOGRAM REPORT: CPT | Mod: ,,, | Performed by: INTERNAL MEDICINE

## 2019-11-20 RX ORDER — ONDANSETRON 4 MG/1
4 TABLET, ORALLY DISINTEGRATING ORAL EVERY 8 HOURS PRN
Qty: 20 TABLET | Refills: 0 | OUTPATIENT
Start: 2019-11-20 | End: 2020-08-27

## 2019-11-20 RX ORDER — BUTALBITAL, ACETAMINOPHEN AND CAFFEINE 50; 325; 40 MG/1; MG/1; MG/1
1 TABLET ORAL
Status: DISCONTINUED | OUTPATIENT
Start: 2019-11-20 | End: 2019-11-20

## 2019-11-20 RX ORDER — ONDANSETRON 8 MG/1
8 TABLET, ORALLY DISINTEGRATING ORAL
Status: COMPLETED | OUTPATIENT
Start: 2019-11-20 | End: 2019-11-20

## 2019-11-20 RX ORDER — ONDANSETRON 4 MG/1
4 TABLET, ORALLY DISINTEGRATING ORAL
Status: COMPLETED | OUTPATIENT
Start: 2019-11-20 | End: 2019-11-20

## 2019-11-20 RX ORDER — HYDROCODONE BITARTRATE AND ACETAMINOPHEN 5; 325 MG/1; MG/1
1 TABLET ORAL
Status: COMPLETED | OUTPATIENT
Start: 2019-11-20 | End: 2019-11-20

## 2019-11-20 RX ORDER — BUTALBITAL, ACETAMINOPHEN AND CAFFEINE 50; 325; 40 MG/1; MG/1; MG/1
2 TABLET ORAL
Status: COMPLETED | OUTPATIENT
Start: 2019-11-20 | End: 2019-11-20

## 2019-11-20 RX ADMIN — CLOSTRIDIUM TETANI TOXOID ANTIGEN (FORMALDEHYDE INACTIVATED), CORYNEBACTERIUM DIPHTHERIAE TOXOID ANTIGEN (FORMALDEHYDE INACTIVATED), BORDETELLA PERTUSSIS TOXOID ANTIGEN (GLUTARALDEHYDE INACTIVATED), BORDETELLA PERTUSSIS FILAMENTOUS HEMAGGLUTININ ANTIGEN (FORMALDEHYDE INACTIVATED), BORDETELLA PERTUSSIS PERTACTIN ANTIGEN, AND BORDETELLA PERTUSSIS FIMBRIAE 2/3 ANTIGEN 0.5 ML: 5; 2; 2.5; 5; 3; 5 INJECTION, SUSPENSION INTRAMUSCULAR at 07:11

## 2019-11-20 RX ADMIN — ONDANSETRON 8 MG: 8 TABLET, ORALLY DISINTEGRATING ORAL at 03:11

## 2019-11-20 RX ADMIN — HYDROCODONE BITARTRATE AND ACETAMINOPHEN 1 TABLET: 5; 325 TABLET ORAL at 02:11

## 2019-11-20 RX ADMIN — BUTALBITAL, ACETAMINOPHEN AND CAFFEINE 2 TABLET: 50; 325; 40 TABLET ORAL at 07:11

## 2019-11-20 RX ADMIN — ONDANSETRON 4 MG: 4 TABLET, ORALLY DISINTEGRATING ORAL at 07:11

## 2019-11-20 NOTE — ED NOTES
Bed: 01  Expected date: 11/20/19  Expected time: 9:34 AM  Means of arrival:   Comments:  Head Bleed

## 2019-11-20 NOTE — ED PROVIDER NOTES
[facility]                                                  Chief Complaint  79 y.o. female with No chief complaint on file.    History of Present Illness  Giselle Rios presents to the emergency room with complaints of falling and hitting her head.  Patient is a 79-year-old female who fell after taking a shower.  She reports that she went to kick the bathroom rug and fell and hit her head against the side of the tub.  She is ambulating well and states that nothing hurts except her head.  She does not have any neck pain. She has a large laceration to her posterior skull which is bleeding.  She is not up-to-date on her tetanus shot.  She is GCS 15 and appears well other than laceration on her head.    Past Medical History:   Diagnosis Date    Hyperlipidemia     Hypothyroidism     Obesity      Past Surgical History:   Procedure Laterality Date    APPENDECTOMY      cervical fusion x 2      HYSTERECTOMY        Review of patient's allergies indicates:  No Known Allergies     Review of Systems and Physical Exam     Review of Systems  -- Constitution - no fever, no weight loss, no loss of consciousness, reports fall and head laceration  -- Eyes - no changes in vision, no redness, no swelling  -- Ear, Nose - no  earache, denies congestion  -- Mouth,Throat - no sore throat, no toothache, normal voice, normal swallowing  -- Respiratory - denies cough and congestion, no shortness of breath, no wheezing  -- Cardiovascular - denies chest pain, no palpitations,   -- Gastrointestinal - denies abdominal pain, denies nausea, vomiting, and diarrhea  -- Genitourinary - no dysuria, no denies flank pain, no hematuria or frequency   -- Musculoskeletal - denies back pain, negative for myalgias and arthralgias   -- Neurological -reports headache, no neurologic changes, no loss of bladder or bowel function no seizure like activity, no changes in hearing or vision  -- Skin - denies skin changes, no rash, no  hives, no suspected skin infection    Vital Signs   vitals were not taken for this visit.      Physical Exam  -- Nursing note and vitals reviewed  -- Constitutional:  Awake alert and oriented, GCS 15, no acute distress.  Appears well.  -- Head:  2.5 cm laceration to posterior scalp.  No facial tenderness or trauma  -- Eyes: Pupils are equal and reactive to light. Extraocular movements intact. No nystagmus.  No periorbital swelling. Normal conjunctiva.   -- Nose: Nose grossly normal in appearance, nares grossly normal. No rhinorrhea.  -- Throat: Mucous membranes moist, pharynx normal, normal tonsils.  Airway patent.  -- Ears: External ears and TM normal bilaterally. Normal hearing.   -- Neck: Normal range of motion. Neck supple. No meningismus. No adenopathy no point tenderness over spine.  C-spine rules negative  -- Cardiac: Normal rate, regular rhythm and normal heart sounds. No carotid bruit. No lower extremity edema.  -- Pulmonary: Normal respiratory effort, breath sounds equal bilaterally. Adequate flow.  No wheezing.  No crackles.  -- Abdominal: Soft, no tenderness, no guarding, no rebound. Normal bowel sounds.   -- Musculoskeletal: Normal range of motion, all 4 extremities 5/5 strength.  Neurovascularly intact. Atraumatic. No deformities.  -- Neurological:  Cranial nerves 2-12 grossly intact. No focal deficits.   -- Vascular: Posterior tibial, dorsalis pedis and radial pulses 2+ bilaterally    -- Lymphatics: No cervical or peripheral lymphadenopathy.   -- Skin: Warm and dry. No evidence of rash or cellulitis  -- Psychiatric: Normal mood and affect. Bedside behavior is appropriate.  Patient is cooperative.  Denies suicidal homicidal ideation.    Emergency Room Course     Treatment Course, Evaluation, and Medical Decision Making  1.  Physical exam significant for 2.5 cm laceration to posterior scalp  2.  CT head with 3 mm anterior falx subdural bleed  3.  Tdap IM  4.  Laceration cleaned with normal saline.  Four  staples placed.  Area dressed with clean gauze.  5.  Patient given Fioricet 2 tablets for pain   6.Transfer to [facility], accepting Dr. chiang with neuro surgery.  Patient will go ER to ER      Medications Given  Medications   Tdap vaccine injection 0.5 mL (has no administration in time range)         Diagnosis  -- subdural bleed    Disposition and Plan  -- Disposition:  Transfer to [facility] ER-- Condition: stable         Georgina Rowe MD  11/20/19 0840       Georgina Rowe MD  11/20/19 0856

## 2019-11-20 NOTE — ED NOTES
[facility] from [facility].  Patient states she drove herself to the ED after falling in shower.  Has 2 in laceration to back of head at [facility].  SDH.  GCS 15.

## 2019-11-20 NOTE — ED PROVIDER NOTES
Encounter Date: 11/20/2019       History     Chief Complaint   Patient presents with    [place] Transfer     SDH; laceration to the back of the head; GCS 15     79-year-old female with hypothyroidism and hyperlipidemia presents as a transfer from [facility] for a subdural hematoma.  The patient had a mechanical fall this morning, she tried to kick a rug in front of her shower and subsequently slipped, hitting back of her head on her shower.  She sustained a laceration to the back and head and had a headache thereafter but denies any loss of consciousness.  She was given Fioricet at the sending facility which improved her headache. Presently, she is endorsing pain over the site of her laceration but denies any other complaints. She denies visual changes, dizziness, numbness/tingling/weakness, difficulty with gait or speech, neck pain back pain. She takes a baby aspirin daily but did not take when this morning.  No other blood thinners.        Review of patient's allergies indicates:  No Known Allergies  Past Medical History:   Diagnosis Date    Hyperlipidemia     Hypothyroidism     Obesity      Past Surgical History:   Procedure Laterality Date    APPENDECTOMY      cervical fusion x 2      HYSTERECTOMY       Family History   Problem Relation Age of Onset    Thyroid nodules Father     Diabetes Sister     Nephrolithiasis Daughter     Breast cancer Daughter     Thyroid disease Paternal Uncle     Diabetes Maternal Grandmother      Social History     Tobacco Use    Smoking status: Never Smoker   Substance Use Topics    Alcohol use: No    Drug use: No     Review of Systems   Constitutional: Negative for fatigue and fever.   HENT: Negative for nosebleeds.    Respiratory: Negative for cough and shortness of breath.    Cardiovascular: Negative for chest pain and palpitations.   Gastrointestinal: Negative for abdominal pain, nausea and vomiting.   Genitourinary: Negative for dysuria and hematuria.    Musculoskeletal: Negative for back pain and neck pain.   Skin: Positive for wound. Negative for pallor.   Neurological: Positive for headaches. Negative for dizziness, tremors, seizures, syncope, facial asymmetry, speech difficulty, weakness, light-headedness and numbness.   Hematological: Does not bruise/bleed easily.   Psychiatric/Behavioral: Negative for confusion.       Physical Exam     Initial Vitals [11/20/19 0954]   BP Pulse Resp Temp SpO2   (!) 144/81 67 16 97.8 °F (36.6 °C) 100 %      MAP       --         Physical Exam    Nursing note and vitals reviewed.  Constitutional: She appears well-developed and well-nourished. She is not diaphoretic. No distress.   HENT:   Head: Normocephalic. Head is with laceration. Head is without raccoon's eyes, without King's sign, without abrasion and without contusion. Hair is normal.       2.5 cm laceration to occipital scalp, repaired with staples.  No active bleeding.   Eyes: EOM are normal. Pupils are equal, round, and reactive to light.   Neck: Normal range of motion. Neck supple.   No posterior midline tenderness to C-spine   Cardiovascular: Normal rate, regular rhythm, normal heart sounds and intact distal pulses. Exam reveals no gallop and no friction rub.    No murmur heard.  Pulmonary/Chest: Breath sounds normal.   Abdominal: Soft. Bowel sounds are normal. She exhibits no distension. There is no tenderness.   Musculoskeletal: Normal range of motion.   No posterior midline tenderness to T or L-spine   Neurological: She is alert and oriented to person, place, and time. She has normal strength. No cranial nerve deficit or sensory deficit. GCS score is 15. GCS eye subscore is 4. GCS verbal subscore is 5. GCS motor subscore is 6.   Skin: Skin is warm and dry.   Psychiatric: She has a normal mood and affect.         ED Course   Procedures  Labs Reviewed   CBC W/ AUTO DIFFERENTIAL - Abnormal; Notable for the following components:       Result Value    Mean Corpuscular  Volume 101 (*)     Mean Corpuscular Hemoglobin Conc 30.6 (*)     All other components within normal limits   APTT   PROTIME-INR   BASIC METABOLIC PANEL   BASIC METABOLIC PANEL    Narrative:     ADD ON BMP PER ERNA LAEN PA-C AT  11/20/2019  12:03   ([facility])          Imaging Results          CT Head Without Contrast (Final result)  Result time 11/20/19 14:53:58    Final result by Cecil Parra MD (11/20/19 14:53:58)                 Impression:      Stable appearance of anterior falcine hematoma measuring 0.3 cm in maximal TV dimension unchanged from prior exam of same date 11/20/2019.    Supratentorial white matter hypointensities likely relating to chronic microvascular ischemic changes.    Electronically signed by resident: Jose C Zelaya  Date:    11/20/2019  Time:    14:19    Electronically signed by: Cecil Parra MD  Date:    11/20/2019  Time:    14:53             Narrative:    EXAMINATION:  CT HEAD WITHOUT CONTRAST    CLINICAL HISTORY:  Headache, post trauma    TECHNIQUE:  Low dose axial images were obtained through the head.  Coronal and sagittal reformations were also performed. Contrast was not administered.    COMPARISON:  CT head 11/20/2019, 09/30/2019    FINDINGS:  Redemonstration of small subdural hematoma along the anterior falx cerebri measuring 0.3 cm in maximum TV dimension and is not significantly changed from CT head exam earlier on same day 11/20/2019 without evidence of expansion.  There is no evidence of any other hemorrhage or blood collection intracranially.    Ventricles within normal limits in size without evidence of hydrocephalus.  Generalized cerebral volume loss similar to prior CT exams and consistent with patient's age.  There is no sulcal effacement or other evidence of large vascular territory infarct.  There is scattered supratentorial white matter hypoattenuation likely relating to chronic microvascular ischemic changes.    Calvarium is intact without evidence of  fracture.  No evidence of extra-axial contusion, edema or blood products.  Paranasal sinuses and mastoid air cells largely clear.                                 Medical Decision Making:   History:   Old Medical Records: I decided to obtain old medical records.  Old Records Summarized: records from another hospital.       <> Summary of Records: Patient presented to [facility] seen and for the same complaint.  She had a CT performed at 8:00 a.m. which showed a 3.7 mm subdural hemorrhage on the anterior falx  Initial Assessment:   79-year-old female presenting as a transfer for small subdural hematoma.  She is very mildly hypertensive at 144/81 with otherwise normal vitals.  GCS 15 and neurologically intact.  Differential Diagnosis:   Enlarging subdural hematoma  Coagulopathy  Low suspicion for blood loss anemia  Posttraumatic headache    Clinical Tests:   Lab Tests: Ordered and Reviewed  Radiological Study: Ordered and Reviewed  ED Management:  Will check labs and consult Neurosurgery.    Labs unremarkable. Patient seen and evaluated by Neurosurgery who recommend repeat CT scan.    Repeat CT scan shows stable anterior falcine hematoma.  Per Neurosurgery recommendation, the patient will be discharged.  I instructed the patient to discontinue aspirin use for 2 weeks until she is evaluated by Neurosurgery for repeat CT scan. Stressed the importance of follow-up, strict ED return precautions given.  Patient voiced understanding and is comfortable with discharge. I discussed this patient with my supervising physician.    Divya Hernandez PA-C    Other:   I have discussed this case with another health care provider.       <> Summary of the Discussion: I discussed this patient with Neurology who recommend repeat head CT at approximately 1:00 p.m..  Will order.                                 Clinical Impression:       ICD-10-CM ICD-9-CM   1. Subdural hematoma S06.5X9A 432.1         Disposition:   Disposition:  Discharged  Condition: Stable                     Divya Hernandez PA-C  11/20/19 2593

## 2019-11-20 NOTE — DISCHARGE INSTRUCTIONS
Please do not take your aspirin for 2 weeks.  Should schedule a follow-up appointment with Neurosurgery in 2 weeks to have your head CT repeated.  That time, if they determine it is appropriate you can restart your aspirin.    You can take Tylenol at home up to 3 g daily which is 6 of the 500 mg extra strength tablets.  You can also use ice on the back of your head for pain. If you start to have fusion, changes in her vision, numbness, weakness or difficulty walking or talking, please return to the emergency department.

## 2019-11-20 NOTE — ED NOTES
Patient resting on stretcher, AAOX4, in NAD.  No neurological changes noted at this time.  VSS.  Side rails up X 2.  Daughter at bedside.  Will continue to monitor.

## 2019-11-21 NOTE — ASSESSMENT & PLAN NOTE
79 y.o. female with small anterior falcine SDH s/p mechanical fall in bathroom.    - Hold ASA for 2 weeks  - Repeat CTH stable  - Awake, alert, and oriented x4; neurologically intact  - Ok to discharge from neurosurgical perspective  - Pt to follow up in NSGY clinic in 2 weeks with repeat CT head

## 2019-11-21 NOTE — SUBJECTIVE & OBJECTIVE
(Not in a hospital admission)    Review of patient's allergies indicates:  No Known Allergies    Past Medical History:   Diagnosis Date    Hyperlipidemia     Hypothyroidism     Obesity      Past Surgical History:   Procedure Laterality Date    APPENDECTOMY      cervical fusion x 2      HYSTERECTOMY       Family History     Problem Relation (Age of Onset)    Breast cancer Daughter    Diabetes Sister, Maternal Grandmother    Nephrolithiasis Daughter    Thyroid disease Paternal Uncle    Thyroid nodules Father        Tobacco Use    Smoking status: Never Smoker   Substance and Sexual Activity    Alcohol use: No    Drug use: No    Sexual activity: Never     Review of Systems   Constitutional: Negative for chills and fever.   HENT: Negative for sinus pressure and sinus pain.    Eyes: Negative for photophobia and visual disturbance.   Respiratory: Negative for cough and shortness of breath.    Cardiovascular: Negative for chest pain and palpitations.   Gastrointestinal: Negative for constipation, diarrhea, nausea and vomiting.   Endocrine: Negative for polydipsia and polyuria.   Genitourinary: Negative for difficulty urinating, frequency and urgency.   Musculoskeletal: Negative for back pain, myalgias, neck pain and neck stiffness.   Skin: Negative for color change and rash.   Neurological: Positive for headaches. Negative for tremors, seizures, syncope, facial asymmetry, speech difficulty, weakness, light-headedness and numbness.   Psychiatric/Behavioral: Negative for agitation and sleep disturbance.     Objective:     Weight: 83.9 kg (185 lb)  Body mass index is 28.13 kg/m².  Vital Signs (Most Recent):  Temp: 98 °F (36.7 °C) (11/20/19 1515)  Pulse: (!) 57 (11/20/19 1515)  Resp: 15 (11/20/19 1515)  BP: (!) 123/57 (11/20/19 1515)  SpO2: 99 % (11/20/19 1515) Vital Signs (24h Range):  Temp:  [97.6 °F (36.4 °C)-98 °F (36.7 °C)] 98 °F (36.7 °C)  Pulse:  [54-83] 57  Resp:  [15-21] 15  SpO2:  [95 %-100 %] 99 %  BP:  (111-154)/(56-93) 123/57         Physical Exam:    Constitutional: She appears well-developed and well-nourished.     Eyes: Pupils are equal, round, and reactive to light. Conjunctivae and EOM are normal.     Cardiovascular: Normal rate, regular rhythm and normal pulses.     Abdominal: Soft.     Psych/Behavior: She is alert. She is oriented to person, place, and time. She has a normal mood and affect.     Musculoskeletal:   Appropriate tone, no evidence of spasm      Neurological:   GCS E4V5M6  CN II-XII grossly intact  Strength 5/5 throughout  Sensation intact to light touch  DTRs 2+ and symmetric       Significant Labs:  Recent Labs   Lab 11/20/19  1009         K 4.5      CO2 26   BUN 17   CREATININE 0.7   CALCIUM 9.1     Recent Labs   Lab 11/20/19  1009   WBC 5.85   HGB 12.9   HCT 42.2        Recent Labs   Lab 11/20/19  1009   INR 1.0   APTT 25.0     Microbiology Results (last 7 days)     ** No results found for the last 168 hours. **        All pertinent labs from the last 24 hours have been reviewed.    Significant Diagnostics:  I have reviewed all pertinent imaging results/findings within the past 24 hours.

## 2019-11-21 NOTE — CONSULTS
[facility]  Neurosurgery  Consult Note    Consults  Subjective:     Chief Complaint/Reason for Admission: SDH    History of Present Illness: Giselle Rios is a 79 y.o. female who presents s/p mechanical fall on the corner of a rug in her bathroom with very small anterior falcine SDH. PMH of hypothyroid, HLD. Pt reports no LOC, a posterior scalp laceration repaired by OSH ED. Pt takes ASA 81mg daily, but did not take today.      (Not in a hospital admission)    Review of patient's allergies indicates:  No Known Allergies    Past Medical History:   Diagnosis Date    Hyperlipidemia     Hypothyroidism     Obesity      Past Surgical History:   Procedure Laterality Date    APPENDECTOMY      cervical fusion x 2      HYSTERECTOMY       Family History     Problem Relation (Age of Onset)    Breast cancer Daughter    Diabetes Sister, Maternal Grandmother    Nephrolithiasis Daughter    Thyroid disease Paternal Uncle    Thyroid nodules Father        Tobacco Use    Smoking status: Never Smoker   Substance and Sexual Activity    Alcohol use: No    Drug use: No    Sexual activity: Never     Review of Systems   Constitutional: Negative for chills and fever.   HENT: Negative for sinus pressure and sinus pain.    Eyes: Negative for photophobia and visual disturbance.   Respiratory: Negative for cough and shortness of breath.    Cardiovascular: Negative for chest pain and palpitations.   Gastrointestinal: Negative for constipation, diarrhea, nausea and vomiting.   Endocrine: Negative for polydipsia and polyuria.   Genitourinary: Negative for difficulty urinating, frequency and urgency.   Musculoskeletal: Negative for back pain, myalgias, neck pain and neck stiffness.   Skin: Negative for color change and rash.   Neurological: Positive for headaches. Negative for tremors, seizures, syncope, facial asymmetry, speech difficulty, weakness, light-headedness and numbness.   Psychiatric/Behavioral: Negative  for agitation and sleep disturbance.     Objective:     Weight: 83.9 kg (185 lb)  Body mass index is 28.13 kg/m².  Vital Signs (Most Recent):  Temp: 98 °F (36.7 °C) (11/20/19 1515)  Pulse: (!) 57 (11/20/19 1515)  Resp: 15 (11/20/19 1515)  BP: (!) 123/57 (11/20/19 1515)  SpO2: 99 % (11/20/19 1515) Vital Signs (24h Range):  Temp:  [97.6 °F (36.4 °C)-98 °F (36.7 °C)] 98 °F (36.7 °C)  Pulse:  [54-83] 57  Resp:  [15-21] 15  SpO2:  [95 %-100 %] 99 %  BP: (111-154)/(56-93) 123/57         Physical Exam:    Constitutional: She appears well-developed and well-nourished.     Eyes: Pupils are equal, round, and reactive to light. Conjunctivae and EOM are normal.     Cardiovascular: Normal rate, regular rhythm and normal pulses.     Abdominal: Soft.     Psych/Behavior: She is alert. She is oriented to person, place, and time. She has a normal mood and affect.     Musculoskeletal:   Appropriate tone, no evidence of spasm      Neurological:   GCS E4V5M6  CN II-XII grossly intact  Strength 5/5 throughout  Sensation intact to light touch  DTRs 2+ and symmetric       Significant Labs:  Recent Labs   Lab 11/20/19  1009         K 4.5      CO2 26   BUN 17   CREATININE 0.7   CALCIUM 9.1     Recent Labs   Lab 11/20/19  1009   WBC 5.85   HGB 12.9   HCT 42.2        Recent Labs   Lab 11/20/19  1009   INR 1.0   APTT 25.0     Microbiology Results (last 7 days)     ** No results found for the last 168 hours. **        All pertinent labs from the last 24 hours have been reviewed.    Significant Diagnostics:  I have reviewed all pertinent imaging results/findings within the past 24 hours.    Assessment/Plan:     SDH (subdural hematoma)  79 y.o. female with small anterior falcine SDH s/p mechanical fall in bathroom.    - Hold ASA for 2 weeks  - Repeat CTH stable  - Awake, alert, and oriented x4; neurologically intact  - Ok to discharge from neurosurgical perspective  - Pt to follow up in NSGY clinic in 2 weeks with repeat  CT head        Thank you for your consult. I will sign off. Please contact us if you have any additional questions.    Ariel Jean MD  Neurosurgery  [facility]

## 2019-11-21 NOTE — HPI
Giselle Rios is a 79 y.o. female who presents s/p mechanical fall on the corner of a rug in her bathroom with very small anterior falcine SDH. PMH of hypothyroid, HLD. Pt reports no LOC, a posterior scalp laceration repaired by OSH ED. Pt takes ASA 81mg daily, but did not take today.

## 2019-12-02 ENCOUNTER — TELEPHONE (OUTPATIENT)
Dept: INTERNAL MEDICINE | Facility: CLINIC | Age: 79
End: 2019-12-02

## 2019-12-02 NOTE — TELEPHONE ENCOUNTER
----- Message from Ping Cruz sent at 11/27/2019 10:20 AM CST -----  Contact: patient  [facility] ER[facility]  And wishes to speak with a nurse regarding this matter.      she  can be reached at [phone]    Thanks  KB

## 2019-12-03 ENCOUNTER — HOSPITAL ENCOUNTER (OUTPATIENT)
Dept: RADIOLOGY | Facility: HOSPITAL | Age: 79
Discharge: HOME | End: 2019-12-03
Attending: NURSE PRACTITIONER
Payer: MEDICARE

## 2019-12-03 DIAGNOSIS — R07.89 OTHER CHEST PAIN: ICD-10-CM

## 2019-12-03 PROCEDURE — 71046 X-RAY EXAM CHEST 2 VIEWS: CPT | Mod: TC

## 2019-12-03 PROCEDURE — 71100 X-RAY EXAM RIBS UNI 2 VIEWS: CPT | Mod: TC,RT

## 2019-12-05 ENCOUNTER — HOSPITAL ENCOUNTER (OUTPATIENT)
Dept: RADIOLOGY | Facility: HOSPITAL | Age: 79
Discharge: HOME | End: 2019-12-05
Attending: STUDENT IN AN ORGANIZED HEALTH CARE EDUCATION/TRAINING PROGRAM
Payer: MEDICARE

## 2019-12-05 ENCOUNTER — OFFICE VISIT (OUTPATIENT)
Dept: NEUROSURGERY | Facility: CLINIC | Age: 79
End: 2019-12-05
Payer: MEDICARE

## 2019-12-05 VITALS
WEIGHT: 189.63 LBS | BODY MASS INDEX: 28.74 KG/M2 | HEART RATE: 67 BPM | DIASTOLIC BLOOD PRESSURE: 61 MMHG | HEIGHT: 68 IN | SYSTOLIC BLOOD PRESSURE: 133 MMHG

## 2019-12-05 DIAGNOSIS — S06.5XAA SDH (SUBDURAL HEMATOMA): Primary | ICD-10-CM

## 2019-12-05 DIAGNOSIS — I62.9 INTRACRANIAL HEMORRHAGE: ICD-10-CM

## 2019-12-05 PROCEDURE — 70450 CT HEAD/BRAIN W/O DYE: CPT | Mod: 26,,, | Performed by: RADIOLOGY

## 2019-12-05 PROCEDURE — 1101F PT FALLS ASSESS-DOCD LE1/YR: CPT | Mod: CPTII,S$GLB,, | Performed by: PHYSICIAN ASSISTANT

## 2019-12-05 PROCEDURE — 99214 OFFICE O/P EST MOD 30 MIN: CPT | Mod: S$GLB,,, | Performed by: PHYSICIAN ASSISTANT

## 2019-12-05 PROCEDURE — 3075F PR MOST RECENT SYSTOLIC BLOOD PRESS GE 130-139MM HG: ICD-10-PCS | Mod: CPTII,S$GLB,, | Performed by: PHYSICIAN ASSISTANT

## 2019-12-05 PROCEDURE — 1125F AMNT PAIN NOTED PAIN PRSNT: CPT | Mod: S$GLB,,, | Performed by: PHYSICIAN ASSISTANT

## 2019-12-05 PROCEDURE — 99999 PR PBB SHADOW E&M-EST. PATIENT-LVL III: ICD-10-PCS | Mod: PBBFAC,,, | Performed by: PHYSICIAN ASSISTANT

## 2019-12-05 PROCEDURE — 3078F DIAST BP <80 MM HG: CPT | Mod: CPTII,S$GLB,, | Performed by: PHYSICIAN ASSISTANT

## 2019-12-05 PROCEDURE — 99999 PR PBB SHADOW E&M-EST. PATIENT-LVL III: CPT | Mod: PBBFAC,,, | Performed by: PHYSICIAN ASSISTANT

## 2019-12-05 PROCEDURE — 1159F MED LIST DOCD IN RCRD: CPT | Mod: S$GLB,,, | Performed by: PHYSICIAN ASSISTANT

## 2019-12-05 PROCEDURE — 1159F PR MEDICATION LIST DOCUMENTED IN MEDICAL RECORD: ICD-10-PCS | Mod: S$GLB,,, | Performed by: PHYSICIAN ASSISTANT

## 2019-12-05 PROCEDURE — 3075F SYST BP GE 130 - 139MM HG: CPT | Mod: CPTII,S$GLB,, | Performed by: PHYSICIAN ASSISTANT

## 2019-12-05 PROCEDURE — 1101F PR PT FALLS ASSESS DOC 0-1 FALLS W/OUT INJ PAST YR: ICD-10-PCS | Mod: CPTII,S$GLB,, | Performed by: PHYSICIAN ASSISTANT

## 2019-12-05 PROCEDURE — 70450 CT HEAD/BRAIN W/O DYE: CPT | Mod: TC

## 2019-12-05 PROCEDURE — 70450 CT HEAD WITHOUT CONTRAST: ICD-10-PCS | Mod: 26,,, | Performed by: RADIOLOGY

## 2019-12-05 PROCEDURE — 1125F PR PAIN SEVERITY QUANTIFIED, PAIN PRESENT: ICD-10-PCS | Mod: S$GLB,,, | Performed by: PHYSICIAN ASSISTANT

## 2019-12-05 PROCEDURE — 3078F PR MOST RECENT DIASTOLIC BLOOD PRESSURE < 80 MM HG: ICD-10-PCS | Mod: CPTII,S$GLB,, | Performed by: PHYSICIAN ASSISTANT

## 2019-12-05 PROCEDURE — 99214 PR OFFICE/OUTPT VISIT, EST, LEVL IV, 30-39 MIN: ICD-10-PCS | Mod: S$GLB,,, | Performed by: PHYSICIAN ASSISTANT

## 2019-12-05 NOTE — PROGRESS NOTES
[facility]  Neurosurgery    SUBJECTIVE:     History of Present Illness:  Giselle Rios is a 79 y.o. female with hypothyroidism and HLD who presents for hospital FU s/p traumatic small anterior falcine SDH on 11/20/19. She fell in her bathroom and hit the back of her head on the bathtub. Denied LOC. She was taking ASA81 daily prior to the fall but denied being instructed to take it by her pcp. Today, she denies headaches, vision changes, new numbness, weakness, gait disturbance, and seizures.     Posterior scalp laceration closed with staples in the ED. Staples have since been removed by urgent care near [facility].    (Not in a hospital admission)    Review of patient's allergies indicates:  No Known Allergies    Past Medical History:   Diagnosis Date    Hyperlipidemia     Hypothyroidism     Obesity      Past Surgical History:   Procedure Laterality Date    APPENDECTOMY      cervical fusion x 2      HYSTERECTOMY       Family History   Problem Relation Age of Onset    Thyroid nodules Father     Diabetes Sister     Nephrolithiasis Daughter     Breast cancer Daughter     Thyroid disease Paternal Uncle     Diabetes Maternal Grandmother      Social History     Tobacco Use    Smoking status: Never Smoker   Substance Use Topics    Alcohol use: No    Drug use: No        Review of Systems:  As noted in HPI  +right chest wall and shoulder pain  - fever, wound drainage     OBJECTIVE:     Vital Signs (Most Recent):  Pulse: 67 (12/05/19 1314)  BP: 133/61 (12/05/19 1314)    Physical Exam:  General: well developed, well nourished, no distress  Head: normocephalic, atraumatic  Neurologic: Alert and oriented. Thought content appropriate  GCS: Motor: 6/Verbal: 5/Eyes: 4 GCS Total: 15  Language: No aphasia  Speech: No dysarthria  Cranial nerves: face symmetric, tongue midline, CN II-XII grossly intact.   Eyes: pupils equal, round, reactive to light with accommodation, EOMI.   Pulmonary: normal  respirations, not labored, no accessory muscles used  Sensory: intact to light touch throughout  Motor Strength: Moves all extremities spontaneously with good tone.  Full strength upper and lower extremities. No abnormal movements seen.   Finger to nose: intact bilaterally   Pronator drift: none noted   Skin: warm, dry and intact, no rashes  Gait: normal   Midline bony tenderness: negative throughout   Paraspinous muscle tenderness: mild tenderness in right thoracic region     Posterior scalp laceration: no redness, swelling, or drainage. Tender to palpation. Large scab over laceration. Staples reportedly removed but cannot see all skin edges through the scab.     Diagnostic Results:  I have personally reviewed imaging and agree with the findings.     Head CT 12/5/19  Clearing of previous anterior falx subdural hematoma.    Chronic remote gliosis changes stable.  No new hemorrhage, subdural fluid, mass or stroke    Head CT 11/20/19  Small subdural hematoma along the anterior falx measuring 3.7 mm in maximum dimension.  No definite parenchymal contusion or subarachnoid hemorrhage.    Age-appropriate generalized cerebral volume loss with moderate chronic microvascular ischemic change.    ASSESSMENT/PLAN:     Giselle Rios is a 79 y.o. female who presents for hospital FU of small anterior falcine traumatic SDH. She has recovered well since her fall and FU head CT shows resolution of previous SDH. She had independently started taking ASA81 and may not need to restart this medication. Advised patient that if her pcp wishes for her to restart, she is cleared to do so. Monitor for sxs of re-bleed (discussed with patient and family). No further neurosurgical FU indicated.     Regarding scalp laceration, staples were removed by urgent care last week. She has a large scab over the area and is still quite tender to palpation. Recommend FU with pcp in one week for wound check to ensure laceration is healing appropriately  without infection.     FU prn    Please feel free to call with any further questions    Disclaimer: This note was dictated by speech recognition. Minor errors in transcription may be present.  Please call with any questions.      Alejandra Schreiber PA-C  [facility]  Department of Neurosurgery  [phone]

## 2020-02-05 ENCOUNTER — HOSPITAL ENCOUNTER (OUTPATIENT)
Dept: RADIOLOGY | Facility: HOSPITAL | Age: 80
Discharge: HOME | End: 2020-02-05
Attending: NURSE PRACTITIONER
Payer: MEDICARE

## 2020-02-05 VITALS — HEIGHT: 68 IN | BODY MASS INDEX: 28.64 KG/M2 | WEIGHT: 189 LBS

## 2020-02-05 DIAGNOSIS — Z12.31 BREAST CANCER SCREENING BY MAMMOGRAM: ICD-10-CM

## 2020-02-05 PROCEDURE — 77067 MAMMO DIGITAL SCREENING BILAT WITH TOMOSYNTHESIS_CAD: ICD-10-PCS | Mod: 26,,, | Performed by: RADIOLOGY

## 2020-02-05 PROCEDURE — 77067 SCR MAMMO BI INCL CAD: CPT | Mod: TC

## 2020-02-05 PROCEDURE — 77063 MAMMO DIGITAL SCREENING BILAT WITH TOMOSYNTHESIS_CAD: ICD-10-PCS | Mod: 26,,, | Performed by: RADIOLOGY

## 2020-02-05 PROCEDURE — 77067 SCR MAMMO BI INCL CAD: CPT | Mod: 26,,, | Performed by: RADIOLOGY

## 2020-02-05 PROCEDURE — 77063 BREAST TOMOSYNTHESIS BI: CPT | Mod: 26,,, | Performed by: RADIOLOGY

## 2020-06-25 ENCOUNTER — HOSPITAL ENCOUNTER (EMERGENCY)
Facility: HOSPITAL | Age: 80
Discharge: LEFT BEFORE DISCHARGE | End: 2020-06-25
Attending: SURGERY
Payer: MEDICARE

## 2020-06-25 VITALS
TEMPERATURE: 99 F | WEIGHT: 194.31 LBS | HEIGHT: 68 IN | RESPIRATION RATE: 16 BRPM | BODY MASS INDEX: 29.45 KG/M2 | OXYGEN SATURATION: 96 % | SYSTOLIC BLOOD PRESSURE: 148 MMHG | DIASTOLIC BLOOD PRESSURE: 73 MMHG | HEART RATE: 70 BPM

## 2020-06-25 DIAGNOSIS — Z53.29 LEFT AGAINST MEDICAL ADVICE: Primary | ICD-10-CM

## 2020-06-25 DIAGNOSIS — R07.9 CHEST PAIN: ICD-10-CM

## 2020-06-25 LAB
ALBUMIN SERPL BCP-MCNC: 3.6 G/DL (ref 3.5–5.2)
ALP SERPL-CCNC: 76 U/L (ref 55–135)
ALT SERPL W/O P-5'-P-CCNC: 12 U/L (ref 10–44)
ANION GAP SERPL CALC-SCNC: 10 MMOL/L (ref 8–16)
APTT BLDCRRT: 28 SEC (ref 21–32)
AST SERPL-CCNC: 17 U/L (ref 10–40)
BACTERIA #/AREA URNS HPF: ABNORMAL /HPF
BASOPHILS # BLD AUTO: 0.04 K/UL (ref 0–0.2)
BASOPHILS NFR BLD: 0.7 % (ref 0–1.9)
BILIRUB SERPL-MCNC: 0.5 MG/DL (ref 0.1–1)
BILIRUB UR QL STRIP: NEGATIVE
BNP SERPL-MCNC: 113 PG/ML (ref 0–99)
BUN SERPL-MCNC: 12 MG/DL (ref 8–23)
CALCIUM SERPL-MCNC: 8.9 MG/DL (ref 8.7–10.5)
CHLORIDE SERPL-SCNC: 105 MMOL/L (ref 95–110)
CK MB SERPL-MCNC: 1.3 NG/ML (ref 0.1–6.5)
CK MB SERPL-MCNC: 1.3 NG/ML (ref 0.1–6.5)
CK MB SERPL-RTO: 1.3 % (ref 0–5)
CK MB SERPL-RTO: 1.5 % (ref 0–5)
CK SERPL-CCNC: 102 U/L (ref 20–180)
CK SERPL-CCNC: 102 U/L (ref 20–180)
CK SERPL-CCNC: 86 U/L (ref 20–180)
CK SERPL-CCNC: 86 U/L (ref 20–180)
CLARITY UR: CLEAR
CO2 SERPL-SCNC: 27 MMOL/L (ref 23–29)
COLOR UR: YELLOW
CREAT SERPL-MCNC: 0.8 MG/DL (ref 0.5–1.4)
D DIMER PPP IA.FEU-MCNC: 1.11 MG/L FEU
DIFFERENTIAL METHOD: NORMAL
EOSINOPHIL # BLD AUTO: 0.2 K/UL (ref 0–0.5)
EOSINOPHIL NFR BLD: 3.8 % (ref 0–8)
ERYTHROCYTE [DISTWIDTH] IN BLOOD BY AUTOMATED COUNT: 13.2 % (ref 11.5–14.5)
EST. GFR  (AFRICAN AMERICAN): >60 ML/MIN/1.73 M^2
EST. GFR  (NON AFRICAN AMERICAN): >60 ML/MIN/1.73 M^2
GLUCOSE SERPL-MCNC: 108 MG/DL (ref 70–110)
GLUCOSE UR QL STRIP: NEGATIVE
HCT VFR BLD AUTO: 40.2 % (ref 37–48.5)
HGB BLD-MCNC: 12.9 G/DL (ref 12–16)
HGB UR QL STRIP: ABNORMAL
IMM GRANULOCYTES # BLD AUTO: 0.01 K/UL (ref 0–0.04)
IMM GRANULOCYTES NFR BLD AUTO: 0.2 % (ref 0–0.5)
INR PPP: 1 (ref 0.8–1.2)
KETONES UR QL STRIP: NEGATIVE
LEUKOCYTE ESTERASE UR QL STRIP: ABNORMAL
LYMPHOCYTES # BLD AUTO: 1.9 K/UL (ref 1–4.8)
LYMPHOCYTES NFR BLD: 33.2 % (ref 18–48)
MAGNESIUM SERPL-MCNC: 2.1 MG/DL (ref 1.6–2.6)
MCH RBC QN AUTO: 30.6 PG (ref 27–31)
MCHC RBC AUTO-ENTMCNC: 32.1 G/DL (ref 32–36)
MCV RBC AUTO: 95 FL (ref 82–98)
MICROSCOPIC COMMENT: ABNORMAL
MONOCYTES # BLD AUTO: 0.6 K/UL (ref 0.3–1)
MONOCYTES NFR BLD: 10.1 % (ref 4–15)
NEUTROPHILS # BLD AUTO: 3 K/UL (ref 1.8–7.7)
NEUTROPHILS NFR BLD: 52 % (ref 38–73)
NITRITE UR QL STRIP: NEGATIVE
NRBC BLD-RTO: 0 /100 WBC
PH UR STRIP: 7 [PH] (ref 5–8)
PHOSPHATE SERPL-MCNC: 3 MG/DL (ref 2.7–4.5)
PLATELET # BLD AUTO: 161 K/UL (ref 150–350)
PMV BLD AUTO: 11.2 FL (ref 9.2–12.9)
POTASSIUM SERPL-SCNC: 3.8 MMOL/L (ref 3.5–5.1)
PROT SERPL-MCNC: 7.4 G/DL (ref 6–8.4)
PROT UR QL STRIP: NEGATIVE
PROTHROMBIN TIME: 10.6 SEC (ref 9–12.5)
RBC # BLD AUTO: 4.22 M/UL (ref 4–5.4)
RBC #/AREA URNS HPF: 2 /HPF (ref 0–4)
SARS-COV-2 RDRP RESP QL NAA+PROBE: NEGATIVE
SODIUM SERPL-SCNC: 142 MMOL/L (ref 136–145)
SP GR UR STRIP: 1.01 (ref 1–1.03)
T4 FREE SERPL-MCNC: 0.93 NG/DL (ref 0.71–1.51)
TROPONIN I SERPL DL<=0.01 NG/ML-MCNC: 0.01 NG/ML (ref 0–0.03)
TROPONIN I SERPL DL<=0.01 NG/ML-MCNC: 0.01 NG/ML (ref 0–0.03)
TSH SERPL DL<=0.005 MIU/L-ACNC: 6.83 UIU/ML (ref 0.4–4)
URN SPEC COLLECT METH UR: ABNORMAL
UROBILINOGEN UR STRIP-ACNC: NEGATIVE EU/DL
WBC # BLD AUTO: 5.84 K/UL (ref 3.9–12.7)
WBC #/AREA URNS HPF: 8 /HPF (ref 0–5)

## 2020-06-25 PROCEDURE — 83735 ASSAY OF MAGNESIUM: CPT

## 2020-06-25 PROCEDURE — 83880 ASSAY OF NATRIURETIC PEPTIDE: CPT

## 2020-06-25 PROCEDURE — 81000 URINALYSIS NONAUTO W/SCOPE: CPT

## 2020-06-25 PROCEDURE — 93005 ELECTROCARDIOGRAM TRACING: CPT

## 2020-06-25 PROCEDURE — 99285 EMERGENCY DEPT VISIT HI MDM: CPT | Mod: 25

## 2020-06-25 PROCEDURE — 36415 COLL VENOUS BLD VENIPUNCTURE: CPT

## 2020-06-25 PROCEDURE — 80053 COMPREHEN METABOLIC PANEL: CPT

## 2020-06-25 PROCEDURE — 85379 FIBRIN DEGRADATION QUANT: CPT

## 2020-06-25 PROCEDURE — 82550 ASSAY OF CK (CPK): CPT | Mod: 91

## 2020-06-25 PROCEDURE — 82553 CREATINE MB FRACTION: CPT | Mod: 91

## 2020-06-25 PROCEDURE — 93010 EKG 12-LEAD: ICD-10-PCS | Mod: ,,, | Performed by: INTERNAL MEDICINE

## 2020-06-25 PROCEDURE — 85610 PROTHROMBIN TIME: CPT

## 2020-06-25 PROCEDURE — 85025 COMPLETE CBC W/AUTO DIFF WBC: CPT

## 2020-06-25 PROCEDURE — 84443 ASSAY THYROID STIM HORMONE: CPT

## 2020-06-25 PROCEDURE — 25000003 PHARM REV CODE 250: Performed by: SURGERY

## 2020-06-25 PROCEDURE — 84439 ASSAY OF FREE THYROXINE: CPT

## 2020-06-25 PROCEDURE — 93010 ELECTROCARDIOGRAM REPORT: CPT | Mod: ,,, | Performed by: INTERNAL MEDICINE

## 2020-06-25 PROCEDURE — 84484 ASSAY OF TROPONIN QUANT: CPT

## 2020-06-25 PROCEDURE — U0002 COVID-19 LAB TEST NON-CDC: HCPCS

## 2020-06-25 PROCEDURE — 85730 THROMBOPLASTIN TIME PARTIAL: CPT

## 2020-06-25 PROCEDURE — 84100 ASSAY OF PHOSPHORUS: CPT

## 2020-06-25 RX ORDER — ASPIRIN 325 MG
325 TABLET ORAL
Status: COMPLETED | OUTPATIENT
Start: 2020-06-25 | End: 2020-06-25

## 2020-06-25 RX ADMIN — ASPIRIN 325 MG ORAL TABLET 325 MG: 325 PILL ORAL at 05:06

## 2020-06-25 NOTE — ED PROVIDER NOTES
[facility]                                                 Chief Complaint  79 y.o. female with Jaw Pain      History of Present Illness  Giselle Rios presents to the emergency room with jaw pain today  Patient with left chest wall pain radiating to the jaw and left arm today  Patient gives no cardiac history, was anxious at home with this pain  Normal sinus rhythm on EKG without ST changes noted in the ER  Pt states she has been doing heavy lifting, has a special needs child    The history is provided by the patient   device was not used during this ER visit  Medical history: HLD, hypothyroidism, obesity  Surgical history: Appendectomy, cervical fusion, hysterectomy  No Known Allergies     I have reviewed all of this patient's past medical, surgical, family, and social   histories as well as active allergies and medications documented in the  electronic medical record    Review of Systems and Physical Exam      Review of Systems  -- Constitution - no fever, denies fatigue, no weakness, no chills  -- Eyes - no tearing or redness, no visual disturbance  -- Ear, Nose - no tinnitus or earache, no nasal congestion or discharge  -- Mouth,Throat - no sore throat, no toothache, normal voice, normal swallowing  -- Respiratory - denies cough and congestion, no shortness of breath, no ALANIS  -- Cardiovascular - denies chest pain, no palpitations, denies claudication  -- Gastrointestinal - denies abdominal pain, nausea, vomiting, or diarrhea  -- Genitourinary - no dysuria, denies flank pain, no hematuria, no STD risk  -- Musculoskeletal - left chest wall pain radiating to jaw and left shoulder  -- Neurological - no headache, denies weakness or seizure; no LOC  -- Skin - denies pallor, rash, or changes in skin. no hives or welts noted  -- Psychiatric - Denies SI or HI, no psychosis or fractured thought noted     Vital Signs  Her oral temperature is 98.6 °F (37 °C).   Her blood pressure  is 148/73 and her pulse is 70.   Her respiration is 16 and oxygen saturation is 96%.     Physical Exam  -- Nursing note and vitals reviewed  -- Constitutional: Appears well-developed and well-nourished  -- Head: Atraumatic. Normocephalic. No obvious abnormality  -- Eyes: Pupils are equal and reactive to light. Normal conjunctiva and lids  -- Neck: Normal range of motion. Neck supple. No masses, trachea midline  -- Cardiac: Normal rate, regular rhythm and normal heart sounds  -- Pulmonary: Normal respiratory effort, breath sounds clear to auscultation  -- Abdominal: Soft, no tenderness. Normal bowel sounds. Normal liver edge  -- Musculoskeletal: Normal range of motion, no effusions. Joints stable   -- Neurological: No focal deficits. Showed good interaction with staff  -- Vascular: Posterior tibial, dorsalis pedis and radial pulses 2+ bilaterally      Emergency Room Course      Lab Results     K 3.8      CO2 27   BUN 12   CREATININE 0.8      ALKPHOS 76   AST 17   ALT 12   BILITOT 0.5   ALBUMIN 3.6   PROT 7.4   WBC 5.84   HGB 12.9   HCT 40.2            CPKMB 1.3   TROPONINI 0.006   INR 1.0    (H)   DDIMER 1.11 (H)   MG 2.1   TSH 6.827 (H)     Urinalysis  -- Urinalysis performed during this ER visit showed no signs of infection      EKG  -- The EKG findings today were without concerning findings from baseline  -- The troponin drawn in the ER today was within normal limits  -- The 2nd troponin drawn in the ER today was within normal limits      Radiology  -- Chest x-ray showed no infiltrate and showed no acute pathology     Medications Given  --  MG ASA given in today in the ER    ED Physician Management  -- Diagnosis management comments: 79 y.o. female with chest wall pain  -- 2 sets of negative troponins, patient was several risk factors noted now  -- patient to be observed overnight, consider CT PE study as well today  -- patient states that she cannot stand  declines further testing in the ER  -- patient signed out of the ER against medical advice this evening  -- patient declined observation, further evaluation, and cardiology consult    Diagnosis  [R07.9] Chest pain  [Z53.20] Left against medical advice    Disposition and Plan  -- Disposition: home  -- Condition: stable  -- Patient is of sound mind and capable of making rational decisions  -- Patient is fully aware of the diagnosis and the consequences of leaving AMA  -- Pt was told inpatient treatment needed but wants to leave against advice  -- Patient signed the AMA papers; all questions answered. Voiced understanding     This note is dictated on M*Modal word recognition program.  There are word recognition mistakes that are occasionally missed on review.         Les Livingston MD  06/25/20 7812

## 2020-06-25 NOTE — ED TRIAGE NOTES
79 yr old female to ER with c/o jaw pain radiating into left shoulder and tightness in the chest. Onset before lunch today. patient denies SOB or nausea.

## 2020-08-27 ENCOUNTER — HOSPITAL ENCOUNTER (EMERGENCY)
Facility: HOSPITAL | Age: 80
Discharge: HOME | End: 2020-08-27
Attending: SURGERY
Payer: MEDICARE

## 2020-08-27 VITALS
TEMPERATURE: 97 F | HEART RATE: 69 BPM | SYSTOLIC BLOOD PRESSURE: 146 MMHG | RESPIRATION RATE: 16 BRPM | OXYGEN SATURATION: 99 % | DIASTOLIC BLOOD PRESSURE: 82 MMHG

## 2020-08-27 DIAGNOSIS — R42 DIZZINESS: ICD-10-CM

## 2020-08-27 LAB
ALBUMIN SERPL BCP-MCNC: 3.7 G/DL (ref 3.5–5.2)
ALP SERPL-CCNC: 65 U/L (ref 55–135)
ALT SERPL W/O P-5'-P-CCNC: 10 U/L (ref 10–44)
ANION GAP SERPL CALC-SCNC: 10 MMOL/L (ref 8–16)
APTT BLDCRRT: 27 SEC (ref 21–32)
AST SERPL-CCNC: 20 U/L (ref 10–40)
BASOPHILS # BLD AUTO: 0.03 K/UL (ref 0–0.2)
BASOPHILS NFR BLD: 0.6 % (ref 0–1.9)
BILIRUB SERPL-MCNC: 0.6 MG/DL (ref 0.1–1)
BILIRUB UR QL STRIP: NEGATIVE
BNP SERPL-MCNC: 113 PG/ML (ref 0–99)
BUN SERPL-MCNC: 11 MG/DL (ref 8–23)
CALCIUM SERPL-MCNC: 9.4 MG/DL (ref 8.7–10.5)
CHLORIDE SERPL-SCNC: 108 MMOL/L (ref 95–110)
CK MB SERPL-MCNC: 0.9 NG/ML (ref 0.1–6.5)
CK MB SERPL-MCNC: 0.9 NG/ML (ref 0.1–6.5)
CK MB SERPL-RTO: 2 % (ref 0–5)
CK MB SERPL-RTO: 2.3 % (ref 0–5)
CK SERPL-CCNC: 40 U/L (ref 20–180)
CK SERPL-CCNC: 40 U/L (ref 20–180)
CK SERPL-CCNC: 45 U/L (ref 20–180)
CK SERPL-CCNC: 45 U/L (ref 20–180)
CLARITY UR: CLEAR
CO2 SERPL-SCNC: 25 MMOL/L (ref 23–29)
COLOR UR: YELLOW
CREAT SERPL-MCNC: 0.8 MG/DL (ref 0.5–1.4)
D DIMER PPP IA.FEU-MCNC: 1.55 MG/L FEU
DIFFERENTIAL METHOD: ABNORMAL
EOSINOPHIL # BLD AUTO: 0.1 K/UL (ref 0–0.5)
EOSINOPHIL NFR BLD: 1.8 % (ref 0–8)
ERYTHROCYTE [DISTWIDTH] IN BLOOD BY AUTOMATED COUNT: 12.9 % (ref 11.5–14.5)
EST. GFR  (AFRICAN AMERICAN): >60 ML/MIN/1.73 M^2
EST. GFR  (NON AFRICAN AMERICAN): >60 ML/MIN/1.73 M^2
GLUCOSE SERPL-MCNC: 98 MG/DL (ref 70–110)
GLUCOSE UR QL STRIP: NEGATIVE
HCT VFR BLD AUTO: 41.4 % (ref 37–48.5)
HGB BLD-MCNC: 13.2 G/DL (ref 12–16)
HGB UR QL STRIP: NEGATIVE
IMM GRANULOCYTES # BLD AUTO: 0.01 K/UL (ref 0–0.04)
IMM GRANULOCYTES NFR BLD AUTO: 0.2 % (ref 0–0.5)
INR PPP: 1 (ref 0.8–1.2)
KETONES UR QL STRIP: NEGATIVE
LEUKOCYTE ESTERASE UR QL STRIP: NEGATIVE
LYMPHOCYTES # BLD AUTO: 1.2 K/UL (ref 1–4.8)
LYMPHOCYTES NFR BLD: 22.4 % (ref 18–48)
MAGNESIUM SERPL-MCNC: 2.1 MG/DL (ref 1.6–2.6)
MCH RBC QN AUTO: 30.7 PG (ref 27–31)
MCHC RBC AUTO-ENTMCNC: 31.9 G/DL (ref 32–36)
MCV RBC AUTO: 96 FL (ref 82–98)
MONOCYTES # BLD AUTO: 0.6 K/UL (ref 0.3–1)
MONOCYTES NFR BLD: 10.3 % (ref 4–15)
NEUTROPHILS # BLD AUTO: 3.5 K/UL (ref 1.8–7.7)
NEUTROPHILS NFR BLD: 64.7 % (ref 38–73)
NITRITE UR QL STRIP: NEGATIVE
NRBC BLD-RTO: 0 /100 WBC
PH UR STRIP: 6 [PH] (ref 5–8)
PHOSPHATE SERPL-MCNC: 2.9 MG/DL (ref 2.7–4.5)
PLATELET # BLD AUTO: 181 K/UL (ref 150–350)
PMV BLD AUTO: 10.9 FL (ref 9.2–12.9)
POTASSIUM SERPL-SCNC: 4.7 MMOL/L (ref 3.5–5.1)
PROT SERPL-MCNC: 7.4 G/DL (ref 6–8.4)
PROT UR QL STRIP: NEGATIVE
PROTHROMBIN TIME: 10.6 SEC (ref 9–12.5)
RBC # BLD AUTO: 4.3 M/UL (ref 4–5.4)
SODIUM SERPL-SCNC: 143 MMOL/L (ref 136–145)
SP GR UR STRIP: 1.01 (ref 1–1.03)
TROPONIN I SERPL DL<=0.01 NG/ML-MCNC: <0.006 NG/ML (ref 0–0.03)
TROPONIN I SERPL DL<=0.01 NG/ML-MCNC: <0.006 NG/ML (ref 0–0.03)
TSH SERPL DL<=0.005 MIU/L-ACNC: 0.43 UIU/ML (ref 0.4–4)
URN SPEC COLLECT METH UR: NORMAL
UROBILINOGEN UR STRIP-ACNC: NEGATIVE EU/DL
WBC # BLD AUTO: 5.45 K/UL (ref 3.9–12.7)

## 2020-08-27 PROCEDURE — 85730 THROMBOPLASTIN TIME PARTIAL: CPT

## 2020-08-27 PROCEDURE — 83880 ASSAY OF NATRIURETIC PEPTIDE: CPT

## 2020-08-27 PROCEDURE — 84484 ASSAY OF TROPONIN QUANT: CPT | Mod: 91

## 2020-08-27 PROCEDURE — 93010 EKG 12-LEAD: ICD-10-PCS | Mod: ,,, | Performed by: INTERNAL MEDICINE

## 2020-08-27 PROCEDURE — 83735 ASSAY OF MAGNESIUM: CPT

## 2020-08-27 PROCEDURE — 80053 COMPREHEN METABOLIC PANEL: CPT

## 2020-08-27 PROCEDURE — 96360 HYDRATION IV INFUSION INIT: CPT | Mod: 59

## 2020-08-27 PROCEDURE — 25000003 PHARM REV CODE 250: Performed by: SURGERY

## 2020-08-27 PROCEDURE — 82550 ASSAY OF CK (CPK): CPT | Mod: 91

## 2020-08-27 PROCEDURE — 85025 COMPLETE CBC W/AUTO DIFF WBC: CPT

## 2020-08-27 PROCEDURE — 93005 ELECTROCARDIOGRAM TRACING: CPT

## 2020-08-27 PROCEDURE — 93010 ELECTROCARDIOGRAM REPORT: CPT | Mod: ,,, | Performed by: INTERNAL MEDICINE

## 2020-08-27 PROCEDURE — 84100 ASSAY OF PHOSPHORUS: CPT

## 2020-08-27 PROCEDURE — 99285 EMERGENCY DEPT VISIT HI MDM: CPT | Mod: 25

## 2020-08-27 PROCEDURE — 85610 PROTHROMBIN TIME: CPT

## 2020-08-27 PROCEDURE — 36415 COLL VENOUS BLD VENIPUNCTURE: CPT

## 2020-08-27 PROCEDURE — 85379 FIBRIN DEGRADATION QUANT: CPT

## 2020-08-27 PROCEDURE — 84443 ASSAY THYROID STIM HORMONE: CPT

## 2020-08-27 PROCEDURE — 82553 CREATINE MB FRACTION: CPT | Mod: 91

## 2020-08-27 PROCEDURE — 25500020 PHARM REV CODE 255: Performed by: SURGERY

## 2020-08-27 PROCEDURE — 81003 URINALYSIS AUTO W/O SCOPE: CPT

## 2020-08-27 RX ORDER — MECLIZINE HYDROCHLORIDE 25 MG/1
25 TABLET ORAL
Status: DISCONTINUED | OUTPATIENT
Start: 2020-08-27 | End: 2020-08-27 | Stop reason: HOSPADM

## 2020-08-27 RX ORDER — ONDANSETRON 4 MG/1
4 TABLET, ORALLY DISINTEGRATING ORAL EVERY 8 HOURS PRN
Qty: 20 TABLET | Refills: 0 | Status: SHIPPED | OUTPATIENT
Start: 2020-08-27 | End: 2024-03-18

## 2020-08-27 RX ORDER — SODIUM CHLORIDE 9 MG/ML
1000 INJECTION, SOLUTION INTRAVENOUS
Status: COMPLETED | OUTPATIENT
Start: 2020-08-27 | End: 2020-08-27

## 2020-08-27 RX ORDER — MECLIZINE HYDROCHLORIDE 25 MG/1
25 TABLET ORAL 3 TIMES DAILY PRN
Qty: 20 TABLET | Refills: 0 | Status: SHIPPED | OUTPATIENT
Start: 2020-08-27 | End: 2024-03-18

## 2020-08-27 RX ADMIN — IOHEXOL 100 ML: 350 INJECTION, SOLUTION INTRAVENOUS at 12:08

## 2020-08-27 RX ADMIN — SODIUM CHLORIDE 1000 ML: 0.9 INJECTION, SOLUTION INTRAVENOUS at 11:08

## 2020-08-27 NOTE — ED PROVIDER NOTES
[facility]                                                 Chief Complaint  80 y.o. female with Dizziness      History of Present Illness  Giselle Rios presents to the emergency room with dizziness today  Dizziness today with a long history of vertigo type symptoms reported  Patient in September 2019 had vertigo symptoms per ER interview now  Patient has an identical presentation today, no neuro deficit noted now  Patient states he is very anxious over the recent death of her brother  Patient states stress and anxiety makes her dizziness worse typically  Patient has a previous history of subdural hematoma, would like ER CT  Normotensive with a normal neurologic exam, afebrile and stable on triage     The history is provided by the patient   device was not used during this ER visit  Medical history: HLD, hypothyroidism, obesity  Surgical history: Appendectomy, cervical fusion, hysterectomy  No Known Allergies    I have reviewed all of this patient's past medical, surgical, family, and social   histories as well as active allergies and medications documented in the  electronic medical record    Review of Systems and Physical Exam      Review of Systems  -- Constitution - no fever, denies fatigue, no weakness, no chills  -- Eyes - no tearing or redness, no visual disturbance  -- Ear, Nose - no tinnitus or earache, no nasal congestion or discharge  -- Mouth,Throat - no sore throat, no toothache, normal voice, normal swallowing  -- Respiratory - denies cough and congestion, no shortness of breath, no ALANIS  -- Cardiovascular - denies chest pain, no palpitations, denies claudication  -- Gastrointestinal - denies abdominal pain, nausea, vomiting, or diarrhea  -- Genitourinary - no dysuria, denies flank pain, no hematuria, no STD risk  -- Musculoskeletal - denies back pain, negative for trauma or injury  -- Neurological - dizziness, no headache, denies weakness or seizure; no  LOC  -- Skin - denies pallor, rash, or changes in skin. no hives or welts noted  -- Psychiatric - anxiety, denies SI or HI, no psychosis or fractured thought    Vital Signs  Tympanic temperature is 97.1 °F (36.2 °C).   Her blood pressure is 146/82 and her pulse is 69.   Her respiration is 16 and oxygen saturation is 99%.     Physical Exam  -- Nursing note and vitals reviewed  -- Constitutional: Appears well-developed and well-nourished  -- Head: Atraumatic. Normocephalic. No obvious abnormality  -- Eyes: Pupils are equal and reactive to light. Normal conjunctiva and lids  -- Nose: Nose normal in appearance, nares grossly normal. No discharge  -- Throat: Mucous membranes moist, pharynx normal, normal tonsils. No lesions   -- Ears: External ears and TM normal bilaterally. Normal hearing and no drainage  -- Neck: Normal range of motion. Neck supple. No masses, trachea midline  -- Cardiac: Normal rate, regular rhythm and normal heart sounds  -- Pulmonary: Normal respiratory effort, breath sounds clear to auscultation  -- Abdominal: Soft, no tenderness. Normal bowel sounds. Normal liver edge  -- Musculoskeletal: Normal range of motion, no effusions. Joints stable   -- Neurological: No focal deficits. Showed good interaction with staff  -- Vascular: Posterior tibial, dorsalis pedis and radial pulses 2+ bilaterally      Emergency Room Course      Lab Results     K 4.7      CO2 25   BUN 11   CREATININE 0.8   GLU 98   ALKPHOS 65   AST 20   ALT 10   BILITOT 0.6   ALBUMIN 3.7   PROT 7.4   WBC 5.45   HGB 13.2   HCT 41.4      CPK 40   CPK 40   CPKMB 0.9   TROPONINI <0.006   INR 1.0    (H)   DDIMER 1.55 (H)   MG 2.1   TSH 0.429     Urinalysis  -- Urinalysis performed during this ER visit showed no signs of infection      EKG  -- The EKG findings today were without concerning findings from baseline  -- The troponin drawn in the ER today was within normal limits  -- The 2nd troponin drawn in the ER today  was within normal limits      Radiology  -- The PE CT was negative for pulmonary embolism or aortic dissection     Medications Given  meclizine tablet 25 mg (0 mg Oral Hold 8/27/20 1245)   promethazine (PHENERGAN) 25 mg in dextrose 5 % 50 mL IVPB (0 mg Intravenous Hold 8/27/20 1245)   0.9%  NaCl infusion (0 mLs Intravenous Stopped 8/27/20 1215)   iohexoL (OMNIPAQUE 350) injection 100 mL (100 mLs Intravenous Given 8/27/20 1202)     ED Physician Management  -- Diagnosis management comments: 80 y.o. female with dizziness today  -- dizziness and anxiety with some grief reaction over brother's death noted  -- extensive negative metabolic workup including CT of the head today  -- patient did have a mildly elevated D-dimer, CT PE study negative  -- patient feeling much better after IV fluids and Antivert given in ER  -- counseled to follow up with Neurology regarding chronic vertigo symptoms  -- asymptomatic on discharge, also follow-up with PCP as an outpatient  -- return to the ER with any concerning symptoms after discharge today    Diagnosis  [R42] Dizziness    Disposition and Plan  -- Disposition: home  -- Condition: stable  -- Follow-up: Patient to follow up with Rina Melara MD in 1-2 days.  -- I advised the patient that we have found no life threatening condition today  -- At this time, I believe the patient is clinically stable for discharge.   -- The patient acknowledges that close follow up with a MD is required   -- Patient agrees to comply with all instruction and direction given in the ER    This note is dictated on M*Modal word recognition program.  There are word recognition mistakes that are occasionally missed on review.         Les Livingston MD  08/27/20 6274

## 2020-09-08 ENCOUNTER — HOSPITAL ENCOUNTER (OUTPATIENT)
Dept: RADIOLOGY | Facility: HOSPITAL | Age: 80
Discharge: HOME | End: 2020-09-08
Attending: NURSE PRACTITIONER
Payer: MEDICARE

## 2020-09-08 DIAGNOSIS — K57.32 DIVERTICULITIS, COLON: ICD-10-CM

## 2020-09-08 DIAGNOSIS — R19.4 CHANGE IN BOWEL HABITS: ICD-10-CM

## 2020-09-08 DIAGNOSIS — R10.9 ABDOMINAL PAIN: ICD-10-CM

## 2020-09-08 PROCEDURE — 25500020 PHARM REV CODE 255

## 2020-09-08 PROCEDURE — 74177 CT ABDOMEN PELVIS WITH CONTRAST: ICD-10-PCS | Mod: 26,,, | Performed by: RADIOLOGY

## 2020-09-08 PROCEDURE — 74177 CT ABD & PELVIS W/CONTRAST: CPT | Mod: TC

## 2020-09-08 PROCEDURE — 74177 CT ABD & PELVIS W/CONTRAST: CPT | Mod: 26,,, | Performed by: RADIOLOGY

## 2020-09-08 RX ADMIN — IOHEXOL 30 ML: 350 INJECTION, SOLUTION INTRAVENOUS at 08:09

## 2020-09-08 RX ADMIN — IOHEXOL 100 ML: 350 INJECTION, SOLUTION INTRAVENOUS at 08:09

## 2020-10-01 ENCOUNTER — HOSPITAL ENCOUNTER (OUTPATIENT)
Dept: RADIOLOGY | Facility: HOSPITAL | Age: 80
Discharge: HOME | End: 2020-10-01
Attending: NURSE PRACTITIONER
Payer: MEDICARE

## 2020-10-01 DIAGNOSIS — M54.16 LUMBAR RADICULOPATHY: ICD-10-CM

## 2020-10-01 PROCEDURE — 72148 MRI LUMBAR SPINE W/O DYE: CPT | Mod: TC

## 2021-02-28 ENCOUNTER — LAB VISIT (OUTPATIENT)
Dept: LAB | Facility: HOSPITAL | Age: 81
End: 2021-02-28
Attending: NURSE PRACTITIONER
Payer: MEDICARE

## 2021-02-28 DIAGNOSIS — Z79.899 ON LONG TERM DRUG THERAPY: ICD-10-CM

## 2021-02-28 DIAGNOSIS — E78.5 DYSLIPIDEMIA: Primary | ICD-10-CM

## 2021-02-28 DIAGNOSIS — E78.5 HYPERLIPIDEMIA: ICD-10-CM

## 2021-02-28 DIAGNOSIS — E03.9 HYPOTHYROIDISM: ICD-10-CM

## 2021-02-28 LAB
ALBUMIN SERPL BCP-MCNC: 3.4 G/DL (ref 3.5–5.2)
ALP SERPL-CCNC: 64 U/L (ref 55–135)
ALT SERPL W/O P-5'-P-CCNC: 10 U/L (ref 10–44)
ANION GAP SERPL CALC-SCNC: 8 MMOL/L (ref 8–16)
AST SERPL-CCNC: 19 U/L (ref 10–40)
BILIRUB SERPL-MCNC: 0.5 MG/DL (ref 0.1–1)
BUN SERPL-MCNC: 19 MG/DL (ref 8–23)
CALCIUM SERPL-MCNC: 8.7 MG/DL (ref 8.7–10.5)
CHLORIDE SERPL-SCNC: 107 MMOL/L (ref 95–110)
CHOLEST SERPL-MCNC: 221 MG/DL (ref 120–199)
CHOLEST/HDLC SERPL: 5.1 {RATIO} (ref 2–5)
CO2 SERPL-SCNC: 28 MMOL/L (ref 23–29)
CREAT SERPL-MCNC: 0.7 MG/DL (ref 0.5–1.4)
ERYTHROCYTE [DISTWIDTH] IN BLOOD BY AUTOMATED COUNT: 12.7 % (ref 11.5–14.5)
EST. GFR  (AFRICAN AMERICAN): >60 ML/MIN/1.73 M^2
EST. GFR  (NON AFRICAN AMERICAN): >60 ML/MIN/1.73 M^2
ESTIMATED AVG GLUCOSE: 108 MG/DL (ref 68–131)
GLUCOSE SERPL-MCNC: 94 MG/DL (ref 70–110)
HBA1C MFR BLD: 5.4 % (ref 4–5.6)
HCT VFR BLD AUTO: 39.2 % (ref 37–48.5)
HDLC SERPL-MCNC: 43 MG/DL (ref 40–75)
HDLC SERPL: 19.5 % (ref 20–50)
HGB BLD-MCNC: 12.4 G/DL (ref 12–16)
LDLC SERPL CALC-MCNC: 150 MG/DL (ref 63–159)
MCH RBC QN AUTO: 30.8 PG (ref 27–31)
MCHC RBC AUTO-ENTMCNC: 31.6 G/DL (ref 32–36)
MCV RBC AUTO: 98 FL (ref 82–98)
NONHDLC SERPL-MCNC: 178 MG/DL
PLATELET # BLD AUTO: 179 K/UL (ref 150–350)
PMV BLD AUTO: 11 FL (ref 9.2–12.9)
POTASSIUM SERPL-SCNC: 4.3 MMOL/L (ref 3.5–5.1)
PROT SERPL-MCNC: 6.8 G/DL (ref 6–8.4)
RBC # BLD AUTO: 4.02 M/UL (ref 4–5.4)
SODIUM SERPL-SCNC: 143 MMOL/L (ref 136–145)
T4 FREE SERPL-MCNC: 0.81 NG/DL (ref 0.71–1.51)
TRIGL SERPL-MCNC: 140 MG/DL (ref 30–150)
TSH SERPL DL<=0.005 MIU/L-ACNC: 12.52 UIU/ML (ref 0.4–4)
WBC # BLD AUTO: 4.75 K/UL (ref 3.9–12.7)

## 2021-02-28 PROCEDURE — 84439 ASSAY OF FREE THYROXINE: CPT

## 2021-02-28 PROCEDURE — 83036 HEMOGLOBIN GLYCOSYLATED A1C: CPT

## 2021-02-28 PROCEDURE — 80053 COMPREHEN METABOLIC PANEL: CPT

## 2021-02-28 PROCEDURE — 36415 COLL VENOUS BLD VENIPUNCTURE: CPT

## 2021-02-28 PROCEDURE — 84443 ASSAY THYROID STIM HORMONE: CPT

## 2021-02-28 PROCEDURE — 80061 LIPID PANEL: CPT

## 2021-02-28 PROCEDURE — 85027 COMPLETE CBC AUTOMATED: CPT

## 2021-03-09 ENCOUNTER — LAB VISIT (OUTPATIENT)
Dept: LAB | Facility: HOSPITAL | Age: 81
End: 2021-03-09
Attending: NURSE PRACTITIONER
Payer: MEDICARE

## 2021-03-09 DIAGNOSIS — R10.32 ABDOMINAL PAIN, LEFT LOWER QUADRANT: ICD-10-CM

## 2021-03-09 DIAGNOSIS — K92.1 MELENA: Primary | ICD-10-CM

## 2021-03-09 LAB
BASOPHILS # BLD AUTO: 0.05 K/UL (ref 0–0.2)
BASOPHILS NFR BLD: 0.9 % (ref 0–1.9)
DIFFERENTIAL METHOD: ABNORMAL
EOSINOPHIL # BLD AUTO: 0.2 K/UL (ref 0–0.5)
EOSINOPHIL NFR BLD: 3.4 % (ref 0–8)
ERYTHROCYTE [DISTWIDTH] IN BLOOD BY AUTOMATED COUNT: 12.9 % (ref 11.5–14.5)
HCT VFR BLD AUTO: 40.2 % (ref 37–48.5)
HGB BLD-MCNC: 12.4 G/DL (ref 12–16)
IMM GRANULOCYTES # BLD AUTO: 0.01 K/UL (ref 0–0.04)
IMM GRANULOCYTES NFR BLD AUTO: 0.2 % (ref 0–0.5)
LYMPHOCYTES # BLD AUTO: 1.4 K/UL (ref 1–4.8)
LYMPHOCYTES NFR BLD: 25.1 % (ref 18–48)
MCH RBC QN AUTO: 30.4 PG (ref 27–31)
MCHC RBC AUTO-ENTMCNC: 30.8 G/DL (ref 32–36)
MCV RBC AUTO: 99 FL (ref 82–98)
MONOCYTES # BLD AUTO: 0.5 K/UL (ref 0.3–1)
MONOCYTES NFR BLD: 8 % (ref 4–15)
NEUTROPHILS # BLD AUTO: 3.5 K/UL (ref 1.8–7.7)
NEUTROPHILS NFR BLD: 62.4 % (ref 38–73)
NRBC BLD-RTO: 0 /100 WBC
PLATELET # BLD AUTO: 176 K/UL (ref 150–350)
PMV BLD AUTO: 10.4 FL (ref 9.2–12.9)
RBC # BLD AUTO: 4.08 M/UL (ref 4–5.4)
WBC # BLD AUTO: 5.61 K/UL (ref 3.9–12.7)

## 2021-03-09 PROCEDURE — 85025 COMPLETE CBC W/AUTO DIFF WBC: CPT | Performed by: NURSE PRACTITIONER

## 2021-03-09 PROCEDURE — 36415 COLL VENOUS BLD VENIPUNCTURE: CPT | Performed by: NURSE PRACTITIONER

## 2021-05-04 ENCOUNTER — PATIENT MESSAGE (OUTPATIENT)
Dept: RESEARCH | Facility: HOSPITAL | Age: 81
End: 2021-05-04

## 2021-05-27 ENCOUNTER — HOSPITAL ENCOUNTER (OUTPATIENT)
Dept: RADIOLOGY | Facility: HOSPITAL | Age: 81
Discharge: HOME | End: 2021-05-27
Attending: NURSE PRACTITIONER
Payer: MEDICARE

## 2021-05-27 VITALS — BODY MASS INDEX: 29.4 KG/M2 | WEIGHT: 194 LBS | HEIGHT: 68 IN

## 2021-05-27 DIAGNOSIS — Z12.31 ENCOUNTER FOR SCREENING MAMMOGRAM FOR MALIGNANT NEOPLASM OF BREAST: ICD-10-CM

## 2021-05-27 PROCEDURE — 77067 SCR MAMMO BI INCL CAD: CPT | Mod: 26,,, | Performed by: RADIOLOGY

## 2021-05-27 PROCEDURE — 77063 MAMMO DIGITAL SCREENING BILAT WITH TOMO: ICD-10-PCS | Mod: 26,,, | Performed by: RADIOLOGY

## 2021-05-27 PROCEDURE — 77067 SCR MAMMO BI INCL CAD: CPT | Mod: TC

## 2021-05-27 PROCEDURE — 77067 MAMMO DIGITAL SCREENING BILAT WITH TOMO: ICD-10-PCS | Mod: 26,,, | Performed by: RADIOLOGY

## 2021-05-27 PROCEDURE — 77063 BREAST TOMOSYNTHESIS BI: CPT | Mod: 26,,, | Performed by: RADIOLOGY

## 2021-06-10 ENCOUNTER — LAB VISIT (OUTPATIENT)
Dept: LAB | Facility: HOSPITAL | Age: 81
End: 2021-06-10
Attending: NURSE PRACTITIONER
Payer: MEDICARE

## 2021-06-10 DIAGNOSIS — E03.9 HYPOTHYROIDISM: Primary | ICD-10-CM

## 2021-06-10 LAB — TSH SERPL DL<=0.005 MIU/L-ACNC: 2.38 UIU/ML (ref 0.4–4)

## 2021-06-10 PROCEDURE — 36415 COLL VENOUS BLD VENIPUNCTURE: CPT | Performed by: NURSE PRACTITIONER

## 2021-06-10 PROCEDURE — 84443 ASSAY THYROID STIM HORMONE: CPT | Performed by: NURSE PRACTITIONER

## 2021-08-06 ENCOUNTER — HOSPITAL ENCOUNTER (OUTPATIENT)
Dept: RADIOLOGY | Facility: HOSPITAL | Age: 81
Discharge: HOME | End: 2021-08-06
Attending: NURSE PRACTITIONER
Payer: MEDICARE

## 2021-08-06 DIAGNOSIS — R10.32 ABDOMINAL PAIN, LEFT LOWER QUADRANT: ICD-10-CM

## 2021-08-06 DIAGNOSIS — K57.92 DIVERTICULITIS: ICD-10-CM

## 2021-08-06 PROCEDURE — 74177 CT ABDOMEN PELVIS WITH CONTRAST: ICD-10-PCS | Mod: 26,,, | Performed by: RADIOLOGY

## 2021-08-06 PROCEDURE — 25500020 PHARM REV CODE 255

## 2021-08-06 PROCEDURE — 74177 CT ABD & PELVIS W/CONTRAST: CPT | Mod: 26,,, | Performed by: RADIOLOGY

## 2021-08-06 PROCEDURE — 74177 CT ABD & PELVIS W/CONTRAST: CPT | Mod: TC

## 2021-08-06 RX ADMIN — IOHEXOL 75 ML: 350 INJECTION, SOLUTION INTRAVENOUS at 02:08

## 2021-08-06 RX ADMIN — IOHEXOL 30 ML: 350 INJECTION, SOLUTION INTRAVENOUS at 02:08

## 2021-08-22 ENCOUNTER — HOSPITAL ENCOUNTER (EMERGENCY)
Facility: HOSPITAL | Age: 81
Discharge: HOME | End: 2021-08-22
Attending: SURGERY
Payer: MEDICARE

## 2021-08-22 VITALS
WEIGHT: 190.5 LBS | DIASTOLIC BLOOD PRESSURE: 70 MMHG | TEMPERATURE: 98 F | SYSTOLIC BLOOD PRESSURE: 132 MMHG | OXYGEN SATURATION: 97 % | RESPIRATION RATE: 16 BRPM | HEART RATE: 64 BPM | BODY MASS INDEX: 28.96 KG/M2

## 2021-08-22 DIAGNOSIS — R07.89 NON-CARDIAC CHEST PAIN: Primary | ICD-10-CM

## 2021-08-22 DIAGNOSIS — R09.1 PLEURISY: ICD-10-CM

## 2021-08-22 LAB
ALBUMIN SERPL BCP-MCNC: 3.6 G/DL (ref 3.5–5.2)
ALP SERPL-CCNC: 70 U/L (ref 55–135)
ALT SERPL W/O P-5'-P-CCNC: 7 U/L (ref 10–44)
ANION GAP SERPL CALC-SCNC: 14 MMOL/L (ref 8–16)
APTT BLDCRRT: 24.6 SEC (ref 21–32)
AST SERPL-CCNC: 16 U/L (ref 10–40)
BASOPHILS # BLD AUTO: 0.06 K/UL (ref 0–0.2)
BASOPHILS NFR BLD: 1 % (ref 0–1.9)
BILIRUB SERPL-MCNC: 0.4 MG/DL (ref 0.1–1)
BNP SERPL-MCNC: 46 PG/ML (ref 0–99)
BUN SERPL-MCNC: 22 MG/DL (ref 8–23)
CALCIUM SERPL-MCNC: 9.6 MG/DL (ref 8.7–10.5)
CHLORIDE SERPL-SCNC: 106 MMOL/L (ref 95–110)
CK MB SERPL-MCNC: 1.3 NG/ML (ref 0.1–6.5)
CK MB SERPL-MCNC: 1.3 NG/ML (ref 0.1–6.5)
CK MB SERPL-RTO: 2.5 % (ref 0–5)
CK MB SERPL-RTO: 2.8 % (ref 0–5)
CK SERPL-CCNC: 46 U/L (ref 20–180)
CK SERPL-CCNC: 46 U/L (ref 20–180)
CK SERPL-CCNC: 52 U/L (ref 20–180)
CK SERPL-CCNC: 52 U/L (ref 20–180)
CO2 SERPL-SCNC: 22 MMOL/L (ref 23–29)
CREAT SERPL-MCNC: 1.1 MG/DL (ref 0.5–1.4)
D DIMER PPP IA.FEU-MCNC: 1.28 MG/L FEU
DIFFERENTIAL METHOD: ABNORMAL
EOSINOPHIL # BLD AUTO: 0.2 K/UL (ref 0–0.5)
EOSINOPHIL NFR BLD: 3.7 % (ref 0–8)
ERYTHROCYTE [DISTWIDTH] IN BLOOD BY AUTOMATED COUNT: 13.4 % (ref 11.5–14.5)
EST. GFR  (AFRICAN AMERICAN): 54 ML/MIN/1.73 M^2
EST. GFR  (NON AFRICAN AMERICAN): 47 ML/MIN/1.73 M^2
GLUCOSE SERPL-MCNC: 107 MG/DL (ref 70–110)
HCT VFR BLD AUTO: 41.6 % (ref 37–48.5)
HGB BLD-MCNC: 13.2 G/DL (ref 12–16)
IMM GRANULOCYTES # BLD AUTO: 0.01 K/UL (ref 0–0.04)
IMM GRANULOCYTES NFR BLD AUTO: 0.2 % (ref 0–0.5)
INR PPP: 1 (ref 0.8–1.2)
LYMPHOCYTES # BLD AUTO: 1.6 K/UL (ref 1–4.8)
LYMPHOCYTES NFR BLD: 25.9 % (ref 18–48)
MCH RBC QN AUTO: 31.3 PG (ref 27–31)
MCHC RBC AUTO-ENTMCNC: 31.7 G/DL (ref 32–36)
MCV RBC AUTO: 99 FL (ref 82–98)
MONOCYTES # BLD AUTO: 0.5 K/UL (ref 0.3–1)
MONOCYTES NFR BLD: 8.1 % (ref 4–15)
NEUTROPHILS # BLD AUTO: 3.8 K/UL (ref 1.8–7.7)
NEUTROPHILS NFR BLD: 61.1 % (ref 38–73)
NRBC BLD-RTO: 0 /100 WBC
PLATELET # BLD AUTO: 172 K/UL (ref 150–450)
PMV BLD AUTO: 11.2 FL (ref 9.2–12.9)
POTASSIUM SERPL-SCNC: 4.3 MMOL/L (ref 3.5–5.1)
PROT SERPL-MCNC: 7.3 G/DL (ref 6–8.4)
PROTHROMBIN TIME: 10.9 SEC (ref 9–12.5)
RBC # BLD AUTO: 4.22 M/UL (ref 4–5.4)
SODIUM SERPL-SCNC: 142 MMOL/L (ref 136–145)
TROPONIN I SERPL DL<=0.01 NG/ML-MCNC: <0.006 NG/ML (ref 0–0.03)
TROPONIN I SERPL DL<=0.01 NG/ML-MCNC: <0.006 NG/ML (ref 0–0.03)
WBC # BLD AUTO: 6.21 K/UL (ref 3.9–12.7)

## 2021-08-22 PROCEDURE — 83880 ASSAY OF NATRIURETIC PEPTIDE: CPT | Performed by: SURGERY

## 2021-08-22 PROCEDURE — 93010 ELECTROCARDIOGRAM REPORT: CPT | Mod: ,,, | Performed by: INTERNAL MEDICINE

## 2021-08-22 PROCEDURE — 85025 COMPLETE CBC W/AUTO DIFF WBC: CPT | Performed by: SURGERY

## 2021-08-22 PROCEDURE — 36415 COLL VENOUS BLD VENIPUNCTURE: CPT | Performed by: SURGERY

## 2021-08-22 PROCEDURE — 99285 EMERGENCY DEPT VISIT HI MDM: CPT | Mod: 25

## 2021-08-22 PROCEDURE — 85730 THROMBOPLASTIN TIME PARTIAL: CPT | Performed by: SURGERY

## 2021-08-22 PROCEDURE — 85379 FIBRIN DEGRADATION QUANT: CPT | Performed by: SURGERY

## 2021-08-22 PROCEDURE — 93005 ELECTROCARDIOGRAM TRACING: CPT

## 2021-08-22 PROCEDURE — 93010 EKG 12-LEAD: ICD-10-PCS | Mod: ,,, | Performed by: INTERNAL MEDICINE

## 2021-08-22 PROCEDURE — 63600175 PHARM REV CODE 636 W HCPCS: Performed by: SURGERY

## 2021-08-22 PROCEDURE — 96372 THER/PROPH/DIAG INJ SC/IM: CPT

## 2021-08-22 PROCEDURE — 82553 CREATINE MB FRACTION: CPT | Performed by: SURGERY

## 2021-08-22 PROCEDURE — 85610 PROTHROMBIN TIME: CPT | Performed by: SURGERY

## 2021-08-22 PROCEDURE — 80053 COMPREHEN METABOLIC PANEL: CPT | Performed by: SURGERY

## 2021-08-22 PROCEDURE — 84484 ASSAY OF TROPONIN QUANT: CPT | Mod: 91 | Performed by: SURGERY

## 2021-08-22 RX ORDER — ONDANSETRON 2 MG/ML
4 INJECTION INTRAMUSCULAR; INTRAVENOUS
Status: COMPLETED | OUTPATIENT
Start: 2021-08-22 | End: 2021-08-22

## 2021-08-22 RX ORDER — METHYLPREDNISOLONE 4 MG/1
TABLET ORAL
Qty: 1 PACKAGE | Refills: 0 | Status: SHIPPED | OUTPATIENT
Start: 2021-08-22 | End: 2023-10-17 | Stop reason: ALTCHOICE

## 2021-08-22 RX ORDER — MORPHINE SULFATE 2 MG/ML
2 INJECTION, SOLUTION INTRAMUSCULAR; INTRAVENOUS
Status: COMPLETED | OUTPATIENT
Start: 2021-08-22 | End: 2021-08-22

## 2021-08-22 RX ORDER — CYCLOBENZAPRINE HCL 10 MG
10 TABLET ORAL 3 TIMES DAILY PRN
Qty: 10 TABLET | Refills: 0 | Status: SHIPPED | OUTPATIENT
Start: 2021-08-22 | End: 2021-08-27

## 2021-08-22 RX ORDER — TRAMADOL HYDROCHLORIDE 50 MG/1
50 TABLET ORAL EVERY 6 HOURS PRN
Qty: 7 TABLET | Refills: 0 | Status: SHIPPED | OUTPATIENT
Start: 2021-08-22 | End: 2021-08-24

## 2021-08-22 RX ADMIN — MORPHINE SULFATE 2 MG: 2 INJECTION, SOLUTION INTRAMUSCULAR; INTRAVENOUS at 01:08

## 2021-08-22 RX ADMIN — ONDANSETRON 4 MG: 2 INJECTION INTRAMUSCULAR; INTRAVENOUS at 01:08

## 2022-01-05 ENCOUNTER — LAB VISIT (OUTPATIENT)
Dept: LAB | Facility: HOSPITAL | Age: 82
End: 2022-01-05
Attending: FAMILY MEDICINE
Payer: MEDICARE

## 2022-01-05 DIAGNOSIS — E03.9 HYPOTHYROIDISM: Primary | ICD-10-CM

## 2022-01-05 LAB
T4 FREE SERPL-MCNC: 1.02 NG/DL (ref 0.71–1.51)
TSH SERPL DL<=0.005 MIU/L-ACNC: 6.15 UIU/ML (ref 0.4–4)

## 2022-01-05 PROCEDURE — 84443 ASSAY THYROID STIM HORMONE: CPT | Performed by: FAMILY MEDICINE

## 2022-01-05 PROCEDURE — 36415 COLL VENOUS BLD VENIPUNCTURE: CPT | Performed by: FAMILY MEDICINE

## 2022-01-05 PROCEDURE — 84439 ASSAY OF FREE THYROXINE: CPT | Performed by: FAMILY MEDICINE

## 2022-01-11 ENCOUNTER — HOSPITAL ENCOUNTER (EMERGENCY)
Facility: HOSPITAL | Age: 82
Discharge: HOME | End: 2022-01-11
Attending: SURGERY
Payer: MEDICARE

## 2022-01-11 VITALS
HEART RATE: 50 BPM | TEMPERATURE: 97 F | BODY MASS INDEX: 28.13 KG/M2 | DIASTOLIC BLOOD PRESSURE: 55 MMHG | OXYGEN SATURATION: 95 % | SYSTOLIC BLOOD PRESSURE: 108 MMHG | WEIGHT: 185 LBS | RESPIRATION RATE: 18 BRPM
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
## 2022-01-11 DIAGNOSIS — M25.551 RIGHT HIP PAIN: ICD-10-CM

## 2022-01-11 DIAGNOSIS — S09.90XA INJURY OF HEAD, INITIAL ENCOUNTER: Primary | ICD-10-CM

## 2022-01-11 DIAGNOSIS — M79.605 LEFT LEG PAIN: ICD-10-CM

## 2022-01-11 PROCEDURE — 99285 EMERGENCY DEPT VISIT HI MDM: CPT | Mod: 25

## 2022-01-11 PROCEDURE — 99284 EMERGENCY DEPT VISIT MOD MDM: CPT | Mod: 25

## 2022-01-11 RX ORDER — CYCLOBENZAPRINE HCL 10 MG
10 TABLET ORAL 3 TIMES DAILY PRN
Qty: 10 TABLET | Refills: 0 | Status: SHIPPED | OUTPATIENT
Start: 2022-01-11 | End: 2022-01-16

## 2022-01-11 RX ORDER — IBUPROFEN 800 MG/1
800 TABLET ORAL EVERY 6 HOURS PRN
Qty: 20 TABLET | Refills: 0 | OUTPATIENT
Start: 2022-01-11 | End: 2022-06-09

## 2022-01-11 NOTE — ED PROVIDER NOTES
[facility]                                                 I reviewed the ER triage nurse's note before evaluating the patient    Chief Complaint  81 y.o. female with Fall     History of Present Illness  Giselle Rios presents to the emergency room after slip and fall 3 days ago  Patient presents with right-sided headache, left thigh pain and right hip pain   Patient is alert appropriate with no signs of neuro deficit on evaluation today  Patient has no leg shortening or sign of hip fracture on clinical evaluation today  Patient reports no signs of close head injury concussion with the fall 3 days ago    The history is provided by the patient  Previous medical records were obtained from [facility]  Previous records are summarized from prior ER visits and hospitalizations  Medical history significant for hyperlipidemia, hypothyroidism, obesity  Surgical history significant for appendectomy, cervical fusion hysterectomy  No Known Allergies   No significant family history  No significant social history, nonsmoker    I have reviewed all of this patient's past medical, surgical, family, and social   histories as well as active allergies and medications documented in the  electronic medical record    Review of Systems and Physical Exam      Review of Systems (all other ROS are otherwise negative)  -- Constitution - no fever, denies fatigue, no weakness, no chills, night sweats  -- Eyes - no tearing or redness, no visual disturbance  -- Ear, Nose - no tinnitus or earache, no nasal congestion or discharge  -- Mouth,Throat - no sore throat, no toothache, normal voice, normal swallowing  -- Respiratory - denies cough and congestion, no shortness of breath, no ALANIS  -- Cardiovascular - denies chest pain, no palpitations, denies claudication  -- Gastrointestinal - denies abdominal pain, nausea, vomiting, or diarrhea  -- Genitourinary - no dysuria, no hematuria, no flank pain, no bladder pain  -- Musculoskeletal  - right hip and left thigh pain  -- Neurological - headache, no numbness, tingling, seizure, balance issues  -- Hematologic- no bruising, no bleeding, nose bleed, bleeding disorders    Vital Signs (reviewed by the physician)  Her oral temperature is 96.7 °F (35.9 °C).   Her blood pressure is 108/55 and her pulse is 50   Her respiration is 18 and oxygen saturation is 95%.     Physical Exam  -- Nursing note and vitals reviewed  -- Constitutional: Appears well-developed and well-nourished  -- Head: Atraumatic. Normocephalic. No obvious abnormality  -- Eyes: Pupils are equal and reactive to light. Normal conjunctiva and lids  -- Cardiac: Normal rate, regular rhythm and normal heart sounds  -- Respiratory: Normal respiratory effort, breath sounds clear to auscultation  -- Gastrointestinal: Soft, no tenderness. Normal bowel sounds. Normal liver edge  -- Musculoskeletal: Normal range of motion, no effusions. Joints stable   -- Neurological: No focal deficits. Showed good interaction with staff  -- Vascular: Posterior tibial, dorsalis pedis and radial pulses 2+ bilaterally      Emergency Room Course      Treatment and Evaluation  -- The CT of the head performed was negative for acute pathology  -- left femur and right hip pain with no signs of acute fracture today    Assessment, Disposition, & Plan      Diagnosis  [W19.XXXA] Fall  [M79.605] Left leg pain  [S09.90XA] Injury of head, initial encounter (Primary)  [M25.551] Right hip pain    Disposition and Plan  -- Disposition: home  -- Condition: stable  -- Follow-up: Patient to follow up with Rina Melara MD in 1-2 days.  -- I advised the patient that we have found no life threatening condition today  -- At this time, I believe the patient is clinically stable for discharge.   -- Pt understands that the visit today is to address immediate concerns   -- Further workup and evaluation as an outpatient may be required  -- The patient acknowledges that close follow up with a  MD is required   -- Patient agrees to comply with all instruction and direction given in the ER    This note is dictated on M*Modal word recognition program.  There are word recognition mistakes that are occasionally missed on review.         Les Livingston MD  01/11/22 1052

## 2022-04-19 ENCOUNTER — LAB VISIT (OUTPATIENT)
Dept: LAB | Facility: HOSPITAL | Age: 82
End: 2022-04-19
Attending: NURSE PRACTITIONER
Payer: MEDICARE

## 2022-04-19 DIAGNOSIS — E03.9 HYPOTHYROID: Primary | ICD-10-CM

## 2022-04-19 LAB — TSH SERPL DL<=0.005 MIU/L-ACNC: 2.22 UIU/ML (ref 0.4–4)

## 2022-04-19 PROCEDURE — 36415 COLL VENOUS BLD VENIPUNCTURE: CPT | Performed by: NURSE PRACTITIONER

## 2022-04-19 PROCEDURE — 84443 ASSAY THYROID STIM HORMONE: CPT | Performed by: NURSE PRACTITIONER

## 2022-04-28 ENCOUNTER — HOSPITAL ENCOUNTER (OUTPATIENT)
Dept: RADIOLOGY | Facility: HOSPITAL | Age: 82
Discharge: HOME | End: 2022-04-28
Attending: NURSE PRACTITIONER
Payer: MEDICARE

## 2022-04-28 DIAGNOSIS — M54.2 CERVICALGIA: ICD-10-CM

## 2022-04-28 DIAGNOSIS — R05.3 CHRONIC COUGH: ICD-10-CM

## 2022-04-28 PROCEDURE — 72040 X-RAY EXAM NECK SPINE 2-3 VW: CPT | Mod: TC

## 2022-04-28 PROCEDURE — 72040 XR CERVICAL SPINE 2 OR 3 VIEWS: ICD-10-PCS | Mod: 26,,, | Performed by: RADIOLOGY

## 2022-04-28 PROCEDURE — 71046 X-RAY EXAM CHEST 2 VIEWS: CPT | Mod: 26,,, | Performed by: RADIOLOGY

## 2022-04-28 PROCEDURE — 72040 X-RAY EXAM NECK SPINE 2-3 VW: CPT | Mod: 26,,, | Performed by: RADIOLOGY

## 2022-04-28 PROCEDURE — 71046 XR CHEST PA AND LATERAL: ICD-10-PCS | Mod: 26,,, | Performed by: RADIOLOGY

## 2022-04-28 PROCEDURE — 71046 X-RAY EXAM CHEST 2 VIEWS: CPT | Mod: TC

## 2022-06-09 ENCOUNTER — HOSPITAL ENCOUNTER (EMERGENCY)
Facility: HOSPITAL | Age: 82
Discharge: HOME | End: 2022-06-09
Attending: STUDENT IN AN ORGANIZED HEALTH CARE EDUCATION/TRAINING PROGRAM
Payer: MEDICARE

## 2022-06-09 VITALS
HEART RATE: 62 BPM | TEMPERATURE: 99 F | OXYGEN SATURATION: 96 % | SYSTOLIC BLOOD PRESSURE: 126 MMHG | RESPIRATION RATE: 20 BRPM | DIASTOLIC BLOOD PRESSURE: 81 MMHG

## 2022-06-09 DIAGNOSIS — M54.50 ACUTE LEFT-SIDED LOW BACK PAIN WITHOUT SCIATICA: ICD-10-CM

## 2022-06-09 DIAGNOSIS — N39.0 ACUTE UTI: Primary | ICD-10-CM

## 2022-06-09 LAB
BACTERIA #/AREA URNS HPF: ABNORMAL /HPF
BILIRUB UR QL STRIP: NEGATIVE
CLARITY UR: CLEAR
COLOR UR: YELLOW
GLUCOSE UR QL STRIP: NEGATIVE
HGB UR QL STRIP: NEGATIVE
KETONES UR QL STRIP: NEGATIVE
LEUKOCYTE ESTERASE UR QL STRIP: ABNORMAL
MICROSCOPIC COMMENT: ABNORMAL
NITRITE UR QL STRIP: NEGATIVE
PH UR STRIP: 6 [PH] (ref 5–8)
PROT UR QL STRIP: NEGATIVE
RBC #/AREA URNS HPF: 4 /HPF (ref 0–4)
SP GR UR STRIP: 1.01 (ref 1–1.03)
SQUAMOUS #/AREA URNS HPF: 20 /HPF
URN SPEC COLLECT METH UR: ABNORMAL
UROBILINOGEN UR STRIP-ACNC: NEGATIVE EU/DL
WBC #/AREA URNS HPF: 12 /HPF (ref 0–5)

## 2022-06-09 PROCEDURE — 81000 URINALYSIS NONAUTO W/SCOPE: CPT | Performed by: STUDENT IN AN ORGANIZED HEALTH CARE EDUCATION/TRAINING PROGRAM

## 2022-06-09 PROCEDURE — 63600175 PHARM REV CODE 636 W HCPCS: Performed by: STUDENT IN AN ORGANIZED HEALTH CARE EDUCATION/TRAINING PROGRAM

## 2022-06-09 PROCEDURE — 25000003 PHARM REV CODE 250: Performed by: STUDENT IN AN ORGANIZED HEALTH CARE EDUCATION/TRAINING PROGRAM

## 2022-06-09 PROCEDURE — 99284 EMERGENCY DEPT VISIT MOD MDM: CPT | Mod: 25

## 2022-06-09 PROCEDURE — 96372 THER/PROPH/DIAG INJ SC/IM: CPT | Performed by: STUDENT IN AN ORGANIZED HEALTH CARE EDUCATION/TRAINING PROGRAM

## 2022-06-09 PROCEDURE — 87086 URINE CULTURE/COLONY COUNT: CPT | Performed by: STUDENT IN AN ORGANIZED HEALTH CARE EDUCATION/TRAINING PROGRAM

## 2022-06-09 RX ORDER — SULFAMETHOXAZOLE AND TRIMETHOPRIM 800; 160 MG/1; MG/1
1 TABLET ORAL 2 TIMES DAILY
Qty: 28 TABLET | Refills: 0 | Status: SHIPPED | OUTPATIENT
Start: 2022-06-09 | End: 2022-06-23

## 2022-06-09 RX ORDER — KETOROLAC TROMETHAMINE 30 MG/ML
15 INJECTION, SOLUTION INTRAMUSCULAR; INTRAVENOUS
Status: COMPLETED | OUTPATIENT
Start: 2022-06-09 | End: 2022-06-09

## 2022-06-09 RX ORDER — IBUPROFEN 800 MG/1
800 TABLET ORAL EVERY 6 HOURS PRN
Qty: 20 TABLET | Refills: 0 | Status: SHIPPED | OUTPATIENT
Start: 2022-06-09

## 2022-06-09 RX ORDER — SULFAMETHOXAZOLE AND TRIMETHOPRIM 800; 160 MG/1; MG/1
1 TABLET ORAL
Status: COMPLETED | OUTPATIENT
Start: 2022-06-09 | End: 2022-06-09

## 2022-06-09 RX ADMIN — SULFAMETHOXAZOLE AND TRIMETHOPRIM 1 TABLET: 800; 160 TABLET ORAL at 09:06

## 2022-06-09 RX ADMIN — KETOROLAC TROMETHAMINE 15 MG: 30 INJECTION, SOLUTION INTRAMUSCULAR at 08:06

## 2022-06-10 NOTE — DISCHARGE INSTRUCTIONS
Return to the ED if you have worsening back pain, fevers over 100.4F, flank pain, nausea, vomiting, or abdominal pain.

## 2022-06-10 NOTE — ED PROVIDER NOTES
Encounter Date: 6/9/2022    This document was partially completed using speech recognition software and may contain misspellings, grammatical errors, and/or unexpected word substitutions.       History     Chief Complaint   Patient presents with    Back Pain     81 year old female with a PMHx of HLD, hypothyroidism, obesity presents to the ED with left flank pain. Started on Saturday and she has seen her PCP yesterday who started her on augmentin yesterday with no tests/labs. Denies abdominal pain, nausea, vomiting, diarrhea. Does have constipation normally and took her linzess which she has PRN. No recent heavy lifting, falls, traumas.        Review of patient's allergies indicates:  No Known Allergies  Past Medical History:   Diagnosis Date    Hyperlipidemia     Hypothyroidism     Obesity      Past Surgical History:   Procedure Laterality Date    APPENDECTOMY      cervical fusion x 2      HYSTERECTOMY       Family History   Problem Relation Age of Onset    Thyroid nodules Father     Diabetes Sister     Nephrolithiasis Daughter     Breast cancer Daughter     Thyroid disease Paternal Uncle     Diabetes Maternal Grandmother      Social History     Tobacco Use    Smoking status: Never Smoker    Smokeless tobacco: Never Used   Substance Use Topics    Alcohol use: No    Drug use: No     Review of Systems   Constitutional: Negative for chills and fever.   HENT: Negative for congestion, rhinorrhea and sneezing.    Eyes: Negative for discharge and redness.   Respiratory: Negative for cough and shortness of breath.    Cardiovascular: Negative for chest pain and palpitations.   Gastrointestinal: Negative for abdominal pain, diarrhea, nausea and vomiting.   Genitourinary: Positive for flank pain. Negative for dysuria, frequency, vaginal bleeding and vaginal discharge.   Musculoskeletal: Positive for back pain. Negative for neck pain.   Skin: Negative for rash and wound.   Neurological: Negative for weakness,  numbness and headaches.       Physical Exam     Initial Vitals [06/09/22 2011]   BP Pulse Resp Temp SpO2   126/81 62 20 98.5 °F (36.9 °C) 96 %      MAP       --         Physical Exam    Nursing note and vitals reviewed.  Constitutional: She appears well-developed. She is not diaphoretic. No distress.   HENT:   Head: Normocephalic and atraumatic.   Right Ear: External ear normal.   Left Ear: External ear normal.   Eyes: Right eye exhibits no discharge. Left eye exhibits no discharge. No scleral icterus.   Neck: Neck supple.   Cardiovascular: Normal rate and regular rhythm.   Pulmonary/Chest: Breath sounds normal. No stridor. No respiratory distress. She has no wheezes. She has no rhonchi. She has no rales.   Abdominal: Abdomen is soft. There is no abdominal tenderness.   No right CVA tenderness.  No left CVA tenderness. There is no guarding.   Musculoskeletal:         General: No edema.      Cervical back: Neck supple.      Thoracic back: Tenderness (left) present.      Lumbar back: Tenderness (left) present.     Neurological: She is alert and oriented to person, place, and time.   Skin: Skin is warm and dry. Capillary refill takes less than 2 seconds.   Psychiatric: She has a normal mood and affect.         ED Course   Procedures  Labs Reviewed   URINALYSIS, REFLEX TO URINE CULTURE - Abnormal; Notable for the following components:       Result Value    Leukocytes, UA 1+ (*)     All other components within normal limits    Narrative:     Specimen Source->Urine   URINALYSIS MICROSCOPIC - Abnormal; Notable for the following components:    WBC, UA 12 (*)     Bacteria Few (*)     All other components within normal limits    Narrative:     Specimen Source->Urine   CULTURE, URINE          Imaging Results    None          Medications   ketorolac injection 15 mg (15 mg Intramuscular Given 6/9/22 2050)   sulfamethoxazole-trimethoprim 800-160mg per tablet 1 tablet (1 tablet Oral Given 6/9/22 2158)     Medical Decision Making:    Differential Diagnosis:   DDx: UTI, pyelonephritis, peritonitis, pancreatitis, diverticulitis, AAA, aortic dissection, MSK pain  ED Management:  Based on the patient's evaluation - patient appears well for discharge home. UA with 12 WBCs, 1 leuk. Will cover with TMP-SMX and discharge home with same. PCP f/u advised. Return precautions given. Patient felt much better with toradol. Patient is in agreement with the plan. Advised to d/c augmentin.                      Clinical Impression:   Final diagnoses:  [N39.0] Acute UTI (Primary)  [M54.50] Acute left-sided low back pain without sciatica          ED Disposition Condition    Discharge Stable        ED Prescriptions     Medication Sig Dispense Start Date End Date Auth. Provider    ibuprofen (IBU) 800 MG tablet Take 1 tablet (800 mg total) by mouth every 6 (six) hours as needed for Pain. 20 tablet 6/9/2022  Jimbo Cam DO    sulfamethoxazole-trimethoprim 800-160mg (BACTRIM DS) 800-160 mg Tab Take 1 tablet by mouth 2 (two) times daily. for 14 days 28 tablet 6/9/2022 6/23/2022 Jimbo Cam DO        Follow-up Information     Follow up With Specialties Details Why Contact Info    Rina Melara MD Family Medicine Schedule an appointment as soon as possible for a visit in 1 week  [id number]   Kate ANDINO [phone]             Jimbo Cam DO  06/09/22 5694

## 2022-06-10 NOTE — ED TRIAGE NOTES
"C/o left lower back pain that radiates up into the left mid back since Saturday. Denies trauma. Patient took Ibuprofen at 1800. Patient reports was prescribed Augmentin yesterday by Kaykay Jefferson NP "in case it was Diverticulitis causing the pain."   "

## 2022-06-11 LAB — BACTERIA UR CULT: NO GROWTH

## 2022-06-14 DIAGNOSIS — R06.09 DOE (DYSPNEA ON EXERTION): Primary | ICD-10-CM

## 2022-07-18 ENCOUNTER — HOSPITAL ENCOUNTER (OUTPATIENT)
Dept: PULMONOLOGY | Facility: HOSPITAL | Age: 82
Discharge: HOME | End: 2022-07-18
Attending: INTERNAL MEDICINE
Payer: MEDICARE

## 2022-07-18 DIAGNOSIS — R06.09 DOE (DYSPNEA ON EXERTION): ICD-10-CM

## 2022-07-18 PROCEDURE — 94729 DIFFUSING CAPACITY: CPT | Mod: 26,,, | Performed by: INTERNAL MEDICINE

## 2022-07-18 PROCEDURE — 94799 PR NIF/PIF PULMONARY FUNCTION TEST: ICD-10-PCS | Mod: 26,,, | Performed by: INTERNAL MEDICINE

## 2022-07-18 PROCEDURE — 94729 DIFFUSING CAPACITY: CPT

## 2022-07-18 PROCEDURE — 94799 UNLISTED PULMONARY SVC/PX: CPT | Mod: 26,,, | Performed by: INTERNAL MEDICINE

## 2022-07-18 PROCEDURE — 94729 PR C02/MEMBANE DIFFUSE CAPACITY: ICD-10-PCS | Mod: 26,,, | Performed by: INTERNAL MEDICINE

## 2022-07-18 PROCEDURE — 99900031 HC PATIENT EDUCATION (STAT)

## 2022-07-18 PROCEDURE — 94727 PR PULM FUNCTION TEST BY GAS: ICD-10-PCS | Mod: 26,,, | Performed by: INTERNAL MEDICINE

## 2022-07-18 PROCEDURE — 94060 EVALUATION OF WHEEZING: CPT

## 2022-07-18 PROCEDURE — 94727 GAS DIL/WSHOT DETER LNG VOL: CPT

## 2022-07-18 PROCEDURE — 94060 PR EVAL OF BRONCHOSPASM: ICD-10-PCS | Mod: 26,,, | Performed by: INTERNAL MEDICINE

## 2022-07-18 PROCEDURE — 94060 EVALUATION OF WHEEZING: CPT | Mod: 26,,, | Performed by: INTERNAL MEDICINE

## 2022-07-18 PROCEDURE — 94727 GAS DIL/WSHOT DETER LNG VOL: CPT | Mod: 26,,, | Performed by: INTERNAL MEDICINE

## 2022-07-18 PROCEDURE — 94010 BREATHING CAPACITY TEST: CPT

## 2022-07-19 ENCOUNTER — HOSPITAL ENCOUNTER (OUTPATIENT)
Dept: RADIOLOGY | Facility: HOSPITAL | Age: 82
Discharge: HOME | End: 2022-07-19
Attending: INTERNAL MEDICINE
Payer: MEDICARE

## 2022-07-19 DIAGNOSIS — R79.89 ELEVATED D-DIMER: ICD-10-CM

## 2022-07-19 LAB
BRPFT: ABNORMAL
DLCO SINGLE BREATH LLN: 16.42
DLCO SINGLE BREATH PRE REF: 49.1 %
DLCO SINGLE BREATH REF: 22.15
DLCOC SBVA LLN: 2.71
DLCOC SBVA REF: 3.95
DLCOC SINGLE BREATH LLN: 16.42
DLCOC SINGLE BREATH REF: 22.15
DLCOVA LLN: 2.71
DLCOVA PRE REF: 89.4 %
DLCOVA PRE: 3.53 ML/(MIN*MMHG*L) (ref 2.71–5.19)
DLCOVA REF: 3.95
ERVN2 LLN: -16449.47
ERVN2 PRE REF: 52.8 %
ERVN2 PRE: 0.28 L (ref -16449.47–16450.53)
ERVN2 REF: 0.53
FEF 25 75 CHG: -11.5 %
FEF 25 75 LLN: 0.67
FEF 25 75 POST REF: 126.3 %
FEF 25 75 PRE REF: 142.8 %
FEF 25 75 REF: 1.64
FET100 CHG: 131.7 %
FEV1 CHG: 1 %
FEV1 FVC CHG: -5.5 %
FEV1 FVC LLN: 61
FEV1 FVC POST REF: 109.3 %
FEV1 FVC PRE REF: 115.6 %
FEV1 FVC REF: 76
FEV1 LLN: 1.49
FEV1 POST REF: 82.4 %
FEV1 PRE REF: 81.5 %
FEV1 REF: 2.14
FRCN2 LLN: 2.13
FRCN2 PRE REF: 50.2 %
FRCN2 REF: 2.95
FVC CHG: 6.9 %
FVC LLN: 1.98
FVC POST REF: 74.1 %
FVC PRE REF: 69.3 %
FVC REF: 2.86
IVC SINGLE BREATH LLN: 1.98
IVC SINGLE BREATH REF: 2.86
MEP LLN: 63
MEP PRE REF: 64.5 %
MEP PRE: 51.58 CMH2O (ref 63.23–96.78)
MEP REF: 80
MIP LLN: 33
MIP PRE REF: 90.2 %
MIP PRE: 45.12 CMH2O (ref 33.23–66.78)
MIP REF: 50
MVV LLN: 75
MVV PRE REF: 83.4 %
MVV REF: 88
PEF CHG: -25.8 %
PEF LLN: 3.4
PEF POST REF: 65.2 %
PEF PRE REF: 87.9 %
PEF REF: 5.33
POST FEF 25 75: 2.07 L/S (ref 0.67–3.11)
POST FET 100: 6.65 SEC
POST FEV1 FVC: 83.41 % (ref 61.49–89.19)
POST FEV1: 1.77 L (ref 1.49–2.77)
POST FVC: 2.12 L (ref 1.98–3.78)
POST PEF: 3.48 L/S (ref 3.4–7.26)
PRE DLCO: 10.87 ML/(MIN*MMHG) (ref 16.42–27.89)
PRE FEF 25 75: 2.34 L/S (ref 0.67–3.11)
PRE FET 100: 2.87 SEC
PRE FEV1 FVC: 88.22 % (ref 61.49–89.19)
PRE FEV1: 1.75 L (ref 1.49–2.77)
PRE FRC N2: 1.48 L (ref 2.13–3.77)
PRE FVC: 1.98 L (ref 1.98–3.78)
PRE MVV: 73.31 L/MIN (ref 74.71–101.08)
PRE PEF: 4.68 L/S (ref 3.4–7.26)
RVN2 LLN: 1.85
RVN2 PRE REF: 49.6 %
RVN2 PRE: 1.2 L (ref 1.85–3)
RVN2 REF: 2.42
RVN2TLCN2 LLN: 36.91
RVN2TLCN2 PRE REF: 80.3 %
RVN2TLCN2 PRE: 37.36 % (ref 36.91–56.09)
RVN2TLCN2 REF: 46.5
TLCN2 LLN: 4.62
TLCN2 PRE REF: 57.3 %
TLCN2 PRE: 3.22 L (ref 4.62–6.6)
TLCN2 REF: 5.61
VA PRE: 3.08 L (ref 5.46–5.46)
VA SINGLE BREATH LLN: 5.46
VA SINGLE BREATH PRE REF: 56.4 %
VA SINGLE BREATH REF: 5.46
VCMAXN2 LLN: 1.98
VCMAXN2 PRE REF: 70.6 %
VCMAXN2 PRE: 2.02 L (ref 1.98–3.78)
VCMAXN2 REF: 2.86

## 2022-07-19 PROCEDURE — 25500020 PHARM REV CODE 255: Performed by: INTERNAL MEDICINE

## 2022-07-19 PROCEDURE — 71275 CT ANGIOGRAPHY CHEST: CPT | Mod: TC

## 2022-07-19 PROCEDURE — 71275 CTA CHEST NON CORONARY: ICD-10-PCS | Mod: 26,,, | Performed by: RADIOLOGY

## 2022-07-19 PROCEDURE — 71275 CT ANGIOGRAPHY CHEST: CPT | Mod: 26,,, | Performed by: RADIOLOGY

## 2022-07-19 RX ADMIN — IOHEXOL 100 ML: 350 INJECTION, SOLUTION INTRAVENOUS at 04:07

## 2022-09-26 ENCOUNTER — HOSPITAL ENCOUNTER (OUTPATIENT)
Dept: RADIOLOGY | Facility: HOSPITAL | Age: 82
Discharge: HOME | End: 2022-09-26
Attending: NURSE PRACTITIONER
Payer: MEDICARE

## 2022-09-26 VITALS — BODY MASS INDEX: 28.04 KG/M2 | WEIGHT: 185 LBS | HEIGHT: 68 IN

## 2022-09-26 DIAGNOSIS — Z12.31 ENCOUNTER FOR SCREENING MAMMOGRAM FOR MALIGNANT NEOPLASM OF BREAST: ICD-10-CM

## 2022-09-26 PROCEDURE — 77063 BREAST TOMOSYNTHESIS BI: CPT | Mod: TC

## 2022-10-31 ENCOUNTER — LAB VISIT (OUTPATIENT)
Dept: LAB | Facility: HOSPITAL | Age: 82
End: 2022-10-31
Attending: INTERNAL MEDICINE
Payer: MEDICARE

## 2022-10-31 DIAGNOSIS — E03.9 HYPOTHYROIDISM: Primary | ICD-10-CM

## 2022-10-31 LAB
T4 FREE SERPL-MCNC: 1.16 NG/DL (ref 0.71–1.51)
TSH SERPL DL<=0.005 MIU/L-ACNC: 0.12 UIU/ML (ref 0.4–4)

## 2022-10-31 PROCEDURE — 84439 ASSAY OF FREE THYROXINE: CPT | Performed by: INTERNAL MEDICINE

## 2022-10-31 PROCEDURE — 84443 ASSAY THYROID STIM HORMONE: CPT | Performed by: INTERNAL MEDICINE

## 2022-10-31 PROCEDURE — 36415 COLL VENOUS BLD VENIPUNCTURE: CPT | Performed by: INTERNAL MEDICINE

## 2022-10-31 PROCEDURE — 86376 MICROSOMAL ANTIBODY EACH: CPT | Performed by: INTERNAL MEDICINE

## 2022-11-01 LAB — THYROPEROXIDASE IGG SERPL-ACNC: <6 IU/ML

## 2023-08-18 ENCOUNTER — HOSPITAL ENCOUNTER (OUTPATIENT)
Dept: RADIOLOGY | Facility: HOSPITAL | Age: 83
Discharge: HOME | End: 2023-08-18
Attending: NURSE PRACTITIONER
Payer: MEDICARE

## 2023-08-18 DIAGNOSIS — R10.11 RIGHT UPPER QUADRANT PAIN: ICD-10-CM

## 2023-08-18 PROCEDURE — 76705 US ABDOMEN LIMITED: ICD-10-PCS | Mod: 26,,, | Performed by: RADIOLOGY

## 2023-08-18 PROCEDURE — 76705 ECHO EXAM OF ABDOMEN: CPT | Mod: TC

## 2023-08-18 PROCEDURE — 76705 ECHO EXAM OF ABDOMEN: CPT | Mod: 26,,, | Performed by: RADIOLOGY

## 2023-09-13 ENCOUNTER — HOSPITAL ENCOUNTER (OUTPATIENT)
Dept: RADIOLOGY | Facility: HOSPITAL | Age: 83
Discharge: HOME | End: 2023-09-13
Attending: NURSE PRACTITIONER
Payer: MEDICARE

## 2023-09-13 DIAGNOSIS — R10.11 RIGHT UPPER QUADRANT PAIN: ICD-10-CM

## 2023-09-13 PROCEDURE — 78227 HEPATOBIL SYST IMAGE W/DRUG: CPT | Mod: 26,,, | Performed by: RADIOLOGY

## 2023-09-13 PROCEDURE — 78227 HEPATOBIL SYST IMAGE W/DRUG: CPT | Mod: TC

## 2023-09-13 PROCEDURE — 78227 NM HEPATOBILIARY(HIDA) WITH PHARM AND EF WHEN PERFORMED: ICD-10-PCS | Mod: 26,,, | Performed by: RADIOLOGY

## 2023-09-27 ENCOUNTER — HOSPITAL ENCOUNTER (OUTPATIENT)
Dept: RADIOLOGY | Facility: HOSPITAL | Age: 83
Discharge: HOME | End: 2023-09-27
Attending: NURSE PRACTITIONER
Payer: MEDICARE

## 2023-09-27 VITALS — HEIGHT: 68 IN | BODY MASS INDEX: 28.04 KG/M2 | WEIGHT: 185 LBS

## 2023-09-27 DIAGNOSIS — Z12.31 ENCOUNTER FOR SCREENING MAMMOGRAM FOR MALIGNANT NEOPLASM OF BREAST: ICD-10-CM

## 2023-09-27 PROCEDURE — 77067 MAMMO DIGITAL SCREENING BILAT WITH TOMO: ICD-10-PCS | Mod: 26,,, | Performed by: RADIOLOGY

## 2023-09-27 PROCEDURE — 77067 SCR MAMMO BI INCL CAD: CPT | Mod: 26,,, | Performed by: RADIOLOGY

## 2023-09-27 PROCEDURE — 77063 MAMMO DIGITAL SCREENING BILAT WITH TOMO: ICD-10-PCS | Mod: 26,,, | Performed by: RADIOLOGY

## 2023-09-27 PROCEDURE — 77067 SCR MAMMO BI INCL CAD: CPT | Mod: TC

## 2023-09-27 PROCEDURE — 77063 BREAST TOMOSYNTHESIS BI: CPT | Mod: 26,,, | Performed by: RADIOLOGY

## 2023-10-13 ENCOUNTER — LAB VISIT (OUTPATIENT)
Dept: LAB | Facility: HOSPITAL | Age: 83
End: 2023-10-13
Attending: INTERNAL MEDICINE
Payer: MEDICARE

## 2023-10-13 DIAGNOSIS — E03.9 HYPOTHYROIDISM: Primary | ICD-10-CM

## 2023-10-13 LAB
T3 SERPL-MCNC: 78 NG/DL (ref 60–180)
T4 FREE SERPL-MCNC: 1.24 NG/DL (ref 0.71–1.51)
TSH SERPL DL<=0.005 MIU/L-ACNC: 0.66 UIU/ML (ref 0.4–4)

## 2023-10-13 PROCEDURE — 84480 ASSAY TRIIODOTHYRONINE (T3): CPT | Performed by: INTERNAL MEDICINE

## 2023-10-13 PROCEDURE — 84439 ASSAY OF FREE THYROXINE: CPT | Performed by: INTERNAL MEDICINE

## 2023-10-13 PROCEDURE — 84443 ASSAY THYROID STIM HORMONE: CPT | Performed by: INTERNAL MEDICINE

## 2023-10-13 PROCEDURE — 36415 COLL VENOUS BLD VENIPUNCTURE: CPT | Performed by: INTERNAL MEDICINE

## 2023-10-17 ENCOUNTER — OFFICE VISIT (OUTPATIENT)
Dept: CARDIOLOGY | Facility: CLINIC | Age: 83
End: 2023-10-17
Payer: MEDICARE

## 2023-10-17 VITALS
OXYGEN SATURATION: 97 % | RESPIRATION RATE: 18 BRPM | HEART RATE: 55 BPM | HEIGHT: 68 IN | WEIGHT: 174.81 LBS | SYSTOLIC BLOOD PRESSURE: 127 MMHG | BODY MASS INDEX: 26.49 KG/M2 | DIASTOLIC BLOOD PRESSURE: 68 MMHG

## 2023-10-17 DIAGNOSIS — R94.31 ABNORMAL EKG: ICD-10-CM

## 2023-10-17 DIAGNOSIS — E78.5 DYSLIPIDEMIA: ICD-10-CM

## 2023-10-17 DIAGNOSIS — I70.0 AORTIC ATHEROSCLEROSIS: ICD-10-CM

## 2023-10-17 DIAGNOSIS — I49.3 PVC'S (PREMATURE VENTRICULAR CONTRACTIONS): ICD-10-CM

## 2023-10-17 DIAGNOSIS — I08.0 MITRAL VALVE INSUFFICIENCY AND AORTIC VALVE INSUFFICIENCY: ICD-10-CM

## 2023-10-17 DIAGNOSIS — E03.9 HYPOTHYROIDISM, UNSPECIFIED TYPE: ICD-10-CM

## 2023-10-17 DIAGNOSIS — R06.09 DOE (DYSPNEA ON EXERTION): ICD-10-CM

## 2023-10-17 DIAGNOSIS — I65.23 BILATERAL CAROTID ARTERY STENOSIS: ICD-10-CM

## 2023-10-17 DIAGNOSIS — I42.9 CARDIOMYOPATHY, UNSPECIFIED TYPE: Primary | ICD-10-CM

## 2023-10-17 DIAGNOSIS — I10 HYPERTENSION, UNSPECIFIED TYPE: ICD-10-CM

## 2023-10-17 PROCEDURE — 1160F PR REVIEW ALL MEDS BY PRESCRIBER/CLIN PHARMACIST DOCUMENTED: ICD-10-PCS | Mod: CPTII,S$GLB,, | Performed by: NURSE PRACTITIONER

## 2023-10-17 PROCEDURE — 3288F FALL RISK ASSESSMENT DOCD: CPT | Mod: CPTII,S$GLB,, | Performed by: NURSE PRACTITIONER

## 2023-10-17 PROCEDURE — 93000 ELECTROCARDIOGRAM COMPLETE: CPT | Mod: S$GLB,,, | Performed by: INTERNAL MEDICINE

## 2023-10-17 PROCEDURE — 1159F MED LIST DOCD IN RCRD: CPT | Mod: CPTII,S$GLB,, | Performed by: NURSE PRACTITIONER

## 2023-10-17 PROCEDURE — 99999 PR PBB SHADOW E&M-EST. PATIENT-LVL III: CPT | Mod: PBBFAC,,, | Performed by: NURSE PRACTITIONER

## 2023-10-17 PROCEDURE — 99999 PR PBB SHADOW E&M-EST. PATIENT-LVL III: ICD-10-PCS | Mod: PBBFAC,,, | Performed by: NURSE PRACTITIONER

## 2023-10-17 PROCEDURE — 3288F PR FALLS RISK ASSESSMENT DOCUMENTED: ICD-10-PCS | Mod: CPTII,S$GLB,, | Performed by: NURSE PRACTITIONER

## 2023-10-17 PROCEDURE — 1126F AMNT PAIN NOTED NONE PRSNT: CPT | Mod: CPTII,S$GLB,, | Performed by: NURSE PRACTITIONER

## 2023-10-17 PROCEDURE — 1160F RVW MEDS BY RX/DR IN RCRD: CPT | Mod: CPTII,S$GLB,, | Performed by: NURSE PRACTITIONER

## 2023-10-17 PROCEDURE — 1126F PR PAIN SEVERITY QUANTIFIED, NO PAIN PRESENT: ICD-10-PCS | Mod: CPTII,S$GLB,, | Performed by: NURSE PRACTITIONER

## 2023-10-17 PROCEDURE — 3074F PR MOST RECENT SYSTOLIC BLOOD PRESSURE < 130 MM HG: ICD-10-PCS | Mod: CPTII,S$GLB,, | Performed by: NURSE PRACTITIONER

## 2023-10-17 PROCEDURE — 3078F DIAST BP <80 MM HG: CPT | Mod: CPTII,S$GLB,, | Performed by: NURSE PRACTITIONER

## 2023-10-17 PROCEDURE — 99204 PR OFFICE/OUTPT VISIT, NEW, LEVL IV, 45-59 MIN: ICD-10-PCS | Mod: 25,S$GLB,, | Performed by: NURSE PRACTITIONER

## 2023-10-17 PROCEDURE — 3074F SYST BP LT 130 MM HG: CPT | Mod: CPTII,S$GLB,, | Performed by: NURSE PRACTITIONER

## 2023-10-17 PROCEDURE — 93000 EKG 12-LEAD: ICD-10-PCS | Mod: S$GLB,,, | Performed by: INTERNAL MEDICINE

## 2023-10-17 PROCEDURE — 99204 OFFICE O/P NEW MOD 45 MIN: CPT | Mod: 25,S$GLB,, | Performed by: NURSE PRACTITIONER

## 2023-10-17 PROCEDURE — 1101F PT FALLS ASSESS-DOCD LE1/YR: CPT | Mod: CPTII,S$GLB,, | Performed by: NURSE PRACTITIONER

## 2023-10-17 PROCEDURE — 1159F PR MEDICATION LIST DOCUMENTED IN MEDICAL RECORD: ICD-10-PCS | Mod: CPTII,S$GLB,, | Performed by: NURSE PRACTITIONER

## 2023-10-17 PROCEDURE — 3078F PR MOST RECENT DIASTOLIC BLOOD PRESSURE < 80 MM HG: ICD-10-PCS | Mod: CPTII,S$GLB,, | Performed by: NURSE PRACTITIONER

## 2023-10-17 PROCEDURE — 1101F PR PT FALLS ASSESS DOC 0-1 FALLS W/OUT INJ PAST YR: ICD-10-PCS | Mod: CPTII,S$GLB,, | Performed by: NURSE PRACTITIONER

## 2023-10-17 RX ORDER — ROSUVASTATIN CALCIUM 20 MG/1
20 TABLET, COATED ORAL DAILY
COMMUNITY

## 2023-10-17 RX ORDER — VALSARTAN AND HYDROCHLOROTHIAZIDE 160; 12.5 MG/1; MG/1
1 TABLET, FILM COATED ORAL DAILY
Qty: 90 TABLET | Refills: 3
Start: 2023-10-17 | End: 2024-03-21 | Stop reason: SDUPTHER

## 2023-10-17 NOTE — PROGRESS NOTES
"[facility]    CC: Establish care    Patient ID: Giselle Rios is a 83 y.o. female with a past medical history of ALANIS, dyslipidemia, abnormal EKG, HTN, cardiomyopathy (EF 40%),     HPI  Cardiomyopathy; mild, EF 40% May 2022; unclear etiology; Neg perfusion study June 2022 w/ [facility]  Mod AS, mod MR; takes ARB/HCTZ; no BB given bradycardia    Aortic atherosclerosis; per imaging study at [facility] 7/2022  No ASA given hx falls with brain bleed; most recent fall 8/2023    Carotid disease; less than 39% bilaterally (last u/s 5/2022)    PVC's; noted per Holter 5/2022; rare    ALANIS; chronic, mod to severe restrictive lung disease on PFTs 7/2022    Dyslipidemia; at goal labs per recent panel 8/2023 with [facility]; takes crestor 20/niacin  Lipids 8/2023; chol 148, Tri 138, HDL 51, LDL 69    Abnormal EKG; NSR, LVH w QRS widening and repolarization; chronic conduction changes      HTN; takes valsartan/HCTZ 160/12.5; /60 today     Prior [facility] patient   "Stable 82-year-old with 1 pound weight gain. Feels well at this time.  moderate aortic valve stenosis area 1.3 mean gradient 9.4 moderate MR possible ASD left ventricular ejection fraction 40% mild bilateral carotid disease May 2022. Mild cardiomyopathy of unclear etiology. No stress-induced ischemia on perfusion study June 2022.  Dyslipidemia and hypothyroidism,  creatinine 0.8 started on rosuvastatin last month.  July 2022 , LDL 64 creatinine 0.8 potassium 4.4 TSH 0.04 T4 11.4.  Mean heart rate 61 bpm moderate PVCs May 2022  Chest x-ray okay ,Moderate to severe restrictive lung disease On PFTs July 2022 seen by Dr. WrenPlan  Increase valsartan to valsartan HCTZ 160-12.5 every morning  Follow-up with PCP and endocrinologist for further thyroid adjustment.  Check blood pressure next month with [facility] CMP CBC TFT TSH BNP  Follow-up 6 months with follow-up echo to reevaluate cardiomyopathy and valvular heart disease.Lifestyle Modification: Maintain normal BMI, " Low Sodium diet. Increase Physical activity.Lifestyle Modification: Maintain normal BMI, Low Sodium diet. Increase Physical activity.    Here to establish care.   She denies any cardiac complaints today     Past Medical History:   Diagnosis Date    Hyperlipidemia     Hypothyroidism     Obesity      Past Surgical History:   Procedure Laterality Date    APPENDECTOMY      cervical fusion x 2      HYSTERECTOMY       Social History     Socioeconomic History    Marital status: Single   Tobacco Use    Smoking status: Never    Smokeless tobacco: Never   Substance and Sexual Activity    Alcohol use: No    Drug use: No    Sexual activity: Never   Social History Narrative    works at the furniture store. Also has a handicapped daughter     Family History   Problem Relation Age of Onset    Thyroid nodules Father     Diabetes Sister     Nephrolithiasis Daughter     Breast cancer Daughter     Thyroid disease Paternal Uncle     Diabetes Maternal Grandmother        Review of patient's allergies indicates:  No Known Allergies    Medication List with Changes/Refills   Current Medications    CHOLECALCIFEROL, VITAMIN D3, (VITAMIN D3) 1,000 UNIT CAPSULE    Take 1,000 Units by mouth once daily.    FUROSEMIDE (LASIX) 20 MG TABLET    Take 20 mg by mouth once daily.     IBUPROFEN (IBU) 800 MG TABLET    Take 1 tablet (800 mg total) by mouth every 6 (six) hours as needed for Pain.    LEVOTHYROXINE (SYNTHROID) 137 MCG TAB TABLET    TAKE ONE TABLET EVERY DAYBEFORE BREAKFAST WITH    EXTRA 1/2 TABLET ONCE PERWEEK    LINZESS 145 MCG CAP        MECLIZINE (ANTIVERT) 25 MG TABLET    Take 1 tablet (25 mg total) by mouth 3 (three) times daily as needed.    METHYLPREDNISOLONE (MEDROL DOSEPACK) 4 MG TABLET    Pack as directed    NIACIN 50 MG TAB    Take 100 mg by mouth every evening.    OMEGA-3 FATTY ACIDS-VITAMIN E (FISH OIL) 1,000 MG CAP    Take 1,000 Units by mouth.    ONDANSETRON (ZOFRAN-ODT) 4 MG TBDL    Take 1 tablet (4 mg total) by mouth every 8  "(eight) hours as needed (nausea).    PRAVASTATIN (PRAVACHOL) 20 MG TABLET        ROSUVASTATIN (CRESTOR) 20 MG TABLET    Take 20 mg by mouth once daily.           Review of Systems   Constitutional: Negative.   Cardiovascular:  Positive for dyspnea on exertion. Negative for chest pain, claudication, cyanosis, irregular heartbeat, leg swelling, near-syncope, orthopnea, palpitations, paroxysmal nocturnal dyspnea and syncope.   Respiratory: Negative.     Skin: Negative.    Musculoskeletal: Negative.    Psychiatric/Behavioral: Negative.         Vitals:    10/17/23 1407   BP: 127/68   Pulse: (!) 55   Resp: 18   SpO2: 97%   Weight: 79.3 kg (174 lb 13.2 oz)   Height: 5' 8" (1.727 m)          Physical Exam  Constitutional:       Appearance: She is obese.   HENT:      Head: Normocephalic.   Cardiovascular:      Rate and Rhythm: Normal rate and regular rhythm.      Heart sounds: Murmur heard.   Pulmonary:      Effort: Pulmonary effort is normal.   Musculoskeletal:         General: Normal range of motion.   Skin:     General: Skin is warm.   Neurological:      Mental Status: She is alert and oriented to person, place, and time.   Psychiatric:         Mood and Affect: Mood normal.           Labs:  Most Recent Data  CBC:   Lab Results   Component Value Date    WBC 6.21 08/22/2021    HGB 13.2 08/22/2021    HCT 41.6 08/22/2021     08/22/2021    MCV 99 (H) 08/22/2021    RDW 13.4 08/22/2021     BMP:   Lab Results   Component Value Date     08/22/2021    K 4.3 08/22/2021     08/22/2021    CO2 22 (L) 08/22/2021    BUN 22 08/22/2021    CREATININE 1.1 08/22/2021     08/22/2021    CALCIUM 9.6 08/22/2021    MG 2.1 08/27/2020    PHOS 2.9 08/27/2020     LFTS;   Lab Results   Component Value Date    PROT 7.3 08/22/2021    ALBUMIN 3.6 08/22/2021    BILITOT 0.4 08/22/2021    AST 16 08/22/2021    ALKPHOS 70 08/22/2021    ALT 7 (L) 08/22/2021     COAGS:   Lab Results   Component Value Date    INR 1.0 08/22/2021     FLP: "   Lab Results   Component Value Date    CHOL 221 (H) 02/28/2021    HDL 43 02/28/2021    LDLCALC 150.0 02/28/2021    TRIG 140 02/28/2021    CHOLHDL 19.5 (L) 02/28/2021     CARDIAC:   Lab Results   Component Value Date    TROPONINI <0.006 08/22/2021    BNP 46 08/22/2021       Assessment/Plan:  Problem List Items Addressed This Visit          Cardiac/Vascular    Dyslipidemia    ALANIS (dyspnea on exertion)    Cardiomyopathy - Primary    Hypertension    Abnormal EKG    Mitral valve insufficiency and aortic valve insufficiency    Aortic atherosclerosis    Bilateral carotid artery stenosis       Endocrine    Hypothyroidism     Other Visit Diagnoses       PVC's (premature ventricular contractions)              Patient doing well with current regimen   Stable from HF/cardiac perspective  Euvolemic on exam today     Follow up 5 months     Total duration of face to face visit time 30 minutes.  Total time spent counseling greater than fifty percent of total visit time.  Counseling included discussion regarding imaging findings, diagnosis, possibilities, treatment options, risks and benefits.  The patient had many questions regarding the options and long-term effects.    Skylar Daniels, NOE-C  Cardiology Clinic  [facility]

## 2024-03-18 ENCOUNTER — LAB VISIT (OUTPATIENT)
Dept: LAB | Facility: HOSPITAL | Age: 84
End: 2024-03-18
Attending: INTERNAL MEDICINE
Payer: MEDICARE

## 2024-03-18 ENCOUNTER — OFFICE VISIT (OUTPATIENT)
Dept: CARDIOLOGY | Facility: CLINIC | Age: 84
End: 2024-03-18
Payer: MEDICARE

## 2024-03-18 VITALS
HEART RATE: 61 BPM | BODY MASS INDEX: 26.63 KG/M2 | HEIGHT: 68 IN | SYSTOLIC BLOOD PRESSURE: 128 MMHG | WEIGHT: 175.69 LBS | RESPIRATION RATE: 18 BRPM | DIASTOLIC BLOOD PRESSURE: 80 MMHG | OXYGEN SATURATION: 100 %

## 2024-03-18 DIAGNOSIS — I42.9 CARDIOMYOPATHY, UNSPECIFIED TYPE: ICD-10-CM

## 2024-03-18 DIAGNOSIS — I10 PRIMARY HYPERTENSION: ICD-10-CM

## 2024-03-18 DIAGNOSIS — I50.42 CHRONIC COMBINED SYSTOLIC AND DIASTOLIC CONGESTIVE HEART FAILURE: ICD-10-CM

## 2024-03-18 DIAGNOSIS — E78.5 DYSLIPIDEMIA: ICD-10-CM

## 2024-03-18 DIAGNOSIS — I08.0 MITRAL VALVE INSUFFICIENCY AND AORTIC VALVE INSUFFICIENCY: ICD-10-CM

## 2024-03-18 DIAGNOSIS — E78.5 HYPERLIPIDEMIA, UNSPECIFIED HYPERLIPIDEMIA TYPE: Primary | ICD-10-CM

## 2024-03-18 DIAGNOSIS — E78.5 HYPERLIPIDEMIA, UNSPECIFIED HYPERLIPIDEMIA TYPE: ICD-10-CM

## 2024-03-18 DIAGNOSIS — I70.0 AORTIC ATHEROSCLEROSIS: ICD-10-CM

## 2024-03-18 LAB
ALBUMIN SERPL BCP-MCNC: 3.7 G/DL (ref 3.5–5.2)
ALP SERPL-CCNC: 59 U/L (ref 55–135)
ALT SERPL W/O P-5'-P-CCNC: 12 U/L (ref 10–44)
ANION GAP SERPL CALC-SCNC: 8 MMOL/L (ref 8–16)
AST SERPL-CCNC: 23 U/L (ref 10–40)
BILIRUB SERPL-MCNC: 0.5 MG/DL (ref 0.1–1)
BUN SERPL-MCNC: 20 MG/DL (ref 8–23)
CALCIUM SERPL-MCNC: 9.6 MG/DL (ref 8.7–10.5)
CHLORIDE SERPL-SCNC: 103 MMOL/L (ref 95–110)
CHOLEST SERPL-MCNC: 151 MG/DL (ref 120–199)
CHOLEST/HDLC SERPL: 3.3 {RATIO} (ref 2–5)
CO2 SERPL-SCNC: 30 MMOL/L (ref 23–29)
CREAT SERPL-MCNC: 0.9 MG/DL (ref 0.5–1.4)
EST. GFR  (NO RACE VARIABLE): >60 ML/MIN/1.73 M^2
GLUCOSE SERPL-MCNC: 93 MG/DL (ref 70–110)
HDLC SERPL-MCNC: 46 MG/DL (ref 40–75)
HDLC SERPL: 30.5 % (ref 20–50)
LDLC SERPL CALC-MCNC: 81.4 MG/DL (ref 63–159)
NONHDLC SERPL-MCNC: 105 MG/DL
POTASSIUM SERPL-SCNC: 4.2 MMOL/L (ref 3.5–5.1)
PROT SERPL-MCNC: 7.1 G/DL (ref 6–8.4)
SODIUM SERPL-SCNC: 141 MMOL/L (ref 136–145)
TRIGL SERPL-MCNC: 118 MG/DL (ref 30–150)

## 2024-03-18 PROCEDURE — 80061 LIPID PANEL: CPT | Performed by: INTERNAL MEDICINE

## 2024-03-18 PROCEDURE — 99214 OFFICE O/P EST MOD 30 MIN: CPT | Mod: S$GLB,,, | Performed by: INTERNAL MEDICINE

## 2024-03-18 PROCEDURE — 36415 COLL VENOUS BLD VENIPUNCTURE: CPT | Performed by: INTERNAL MEDICINE

## 2024-03-18 PROCEDURE — 80053 COMPREHEN METABOLIC PANEL: CPT | Performed by: INTERNAL MEDICINE

## 2024-03-18 PROCEDURE — 99999 PR PBB SHADOW E&M-EST. PATIENT-LVL III: CPT | Mod: PBBFAC,,, | Performed by: INTERNAL MEDICINE

## 2024-03-18 RX ORDER — FUROSEMIDE 20 MG/1
20 TABLET ORAL
COMMUNITY
Start: 2024-02-29

## 2024-03-18 RX ORDER — FINASTERIDE 5 MG/1
5 TABLET, FILM COATED ORAL
COMMUNITY
Start: 2024-03-11

## 2024-03-18 RX ORDER — FLUOXETINE 10 MG/1
10 CAPSULE ORAL
COMMUNITY
Start: 2024-02-29

## 2024-03-18 RX ORDER — CLOBETASOL PROPIONATE 0.46 MG/ML
SOLUTION TOPICAL NIGHTLY PRN
COMMUNITY
Start: 2023-11-14

## 2024-03-18 RX ORDER — KETOCONAZOLE 20 MG/ML
SHAMPOO, SUSPENSION TOPICAL
COMMUNITY
Start: 2023-11-14

## 2024-03-18 NOTE — PROGRESS NOTES
[facility]     Subjective:    Patient ID:  Giselle Rios is a 83 y.o. female who presents for follow-up of Valvular Heart Disease, Hypertension, and Hyperlipidemia    Review of patient's allergies indicates:  No Known Allergies   She is followed for hypertension and hyperlipidemia.  An echo was done in 2022 showing EF 40%.  A Lexiscan was also done at that time showing no perfusion defects.  She has no complaints of chest pain or shortness of breath.  She does have fatigue symptoms.  She was concerned that her valsartan-HCTZ was the cause of fatigue.    She retired 3 years ago.  She does not get out of the house much.  She checks her blood pressure every morning and confirms they are in good range.  She has not had labs since converting from Pravachol to Crestor.  On Pravachol her LDL was 150 mg%.  She is reported to have mild-to-moderate aortic stenosis and moderate mitral regurgitation.        Review of Systems   Constitutional: Negative for chills, decreased appetite, diaphoresis, fever, malaise/fatigue, night sweats, weight gain and weight loss.   HENT:  Negative for congestion, ear discharge, ear pain, hearing loss, hoarse voice, nosebleeds, odynophagia, sore throat, stridor and tinnitus.    Eyes:  Negative for blurred vision, discharge, double vision, pain, photophobia, redness, vision loss in left eye, vision loss in right eye, visual disturbance and visual halos.   Cardiovascular:  Positive for dyspnea on exertion. Negative for chest pain, claudication, cyanosis, irregular heartbeat, leg swelling, near-syncope, orthopnea, palpitations, paroxysmal nocturnal dyspnea and syncope.   Respiratory:  Positive for shortness of breath. Negative for cough, hemoptysis, sleep disturbances due to breathing, snoring, sputum production and wheezing.    Endocrine: Negative for cold intolerance, heat intolerance, polydipsia, polyphagia and  "polyuria.   Hematologic/Lymphatic: Negative for adenopathy and bleeding problem. Does not bruise/bleed easily.   Skin:  Negative for color change, dry skin, flushing, itching, nail changes, poor wound healing, rash, skin cancer, suspicious lesions and unusual hair distribution.   Musculoskeletal:  Negative for arthritis, back pain, falls, gout, joint pain, joint swelling, muscle cramps, muscle weakness, myalgias, neck pain and stiffness.   Gastrointestinal:  Negative for bloating, abdominal pain, anorexia, change in bowel habit, bowel incontinence, constipation, diarrhea, dysphagia, excessive appetite, flatus, heartburn, hematemesis, hematochezia, hemorrhoids, jaundice, melena, nausea and vomiting.   Genitourinary:  Negative for bladder incontinence, decreased libido, dysuria, flank pain, frequency, genital sores, hematuria, hesitancy, incomplete emptying, nocturia and urgency.   Neurological:  Negative for aphonia, brief paralysis, difficulty with concentration, disturbances in coordination, excessive daytime sleepiness, dizziness, focal weakness, headaches, light-headedness, loss of balance, numbness, paresthesias, seizures, sensory change, tremors, vertigo and weakness.   Psychiatric/Behavioral:  Negative for altered mental status, depression, hallucinations, memory loss, substance abuse, suicidal ideas and thoughts of violence. The patient does not have insomnia and is not nervous/anxious.    Allergic/Immunologic: Negative for hives and persistent infections.        Objective:       Vitals:    03/18/24 1341   BP: 128/80   Pulse: 61   Resp: 18   SpO2: 100%   Weight: 79.7 kg (175 lb 11.3 oz)   Height: 5' 8" (1.727 m)    Physical Exam  Constitutional:       General: She is not in acute distress.     Appearance: She is well-developed. She is not diaphoretic.   HENT:      Head: Normocephalic and atraumatic.      Nose: Nose normal.   Eyes:      General: No scleral icterus.        Right eye: No discharge.      " Conjunctiva/sclera: Conjunctivae normal.      Pupils: Pupils are equal, round, and reactive to light.   Neck:      Thyroid: No thyromegaly.      Vascular: No JVD.      Trachea: No tracheal deviation.   Cardiovascular:      Rate and Rhythm: Normal rate and regular rhythm.      Pulses:           Carotid pulses are 2+ on the right side and 2+ on the left side.       Radial pulses are 2+ on the right side and 2+ on the left side.        Dorsalis pedis pulses are 2+ on the right side and 2+ on the left side.        Posterior tibial pulses are 2+ on the right side and 2+ on the left side.      Heart sounds: Normal heart sounds. No murmur heard.     No friction rub. No gallop.   Pulmonary:      Effort: Pulmonary effort is normal. No respiratory distress.      Breath sounds: Normal breath sounds. No stridor. No wheezing or rales.   Chest:      Chest wall: No tenderness.   Abdominal:      General: Bowel sounds are normal. There is no distension.      Palpations: Abdomen is soft. There is no mass.      Tenderness: There is no abdominal tenderness. There is no guarding or rebound.   Musculoskeletal:         General: No tenderness. Normal range of motion.      Cervical back: Normal range of motion and neck supple.   Lymphadenopathy:      Cervical: No cervical adenopathy.   Skin:     General: Skin is warm and dry.      Coloration: Skin is not pale.      Findings: No erythema or rash.   Neurological:      Mental Status: She is alert and oriented to person, place, and time.      Cranial Nerves: No cranial nerve deficit.      Coordination: Coordination normal.   Psychiatric:         Behavior: Behavior normal.         Thought Content: Thought content normal.         Judgment: Judgment normal.           Assessment:       1. Hyperlipidemia, unspecified hyperlipidemia type    2. Cardiomyopathy, unspecified type    3. Aortic atherosclerosis    4. Mitral valve insufficiency and aortic valve insufficiency    5. Primary hypertension    6.  Dyslipidemia    7. Chronic combined systolic and diastolic congestive heart failure      Results for orders placed or performed in visit on 03/18/24   Lipid Panel   Result Value Ref Range    Cholesterol 151 120 - 199 mg/dL    Triglycerides 118 30 - 150 mg/dL    HDL 46 40 - 75 mg/dL    LDL Cholesterol 81.4 63.0 - 159.0 mg/dL    HDL/Cholesterol Ratio 30.5 20.0 - 50.0 %    Total Cholesterol/HDL Ratio 3.3 2.0 - 5.0    Non-HDL Cholesterol 105 mg/dL   Comprehensive Metabolic Panel   Result Value Ref Range    Sodium 141 136 - 145 mmol/L    Potassium 4.2 3.5 - 5.1 mmol/L    Chloride 103 95 - 110 mmol/L    CO2 30 (H) 23 - 29 mmol/L    Glucose 93 70 - 110 mg/dL    BUN 20 8 - 23 mg/dL    Creatinine 0.9 0.5 - 1.4 mg/dL    Calcium 9.6 8.7 - 10.5 mg/dL    Total Protein 7.1 6.0 - 8.4 g/dL    Albumin 3.7 3.5 - 5.2 g/dL    Total Bilirubin 0.5 0.1 - 1.0 mg/dL    Alkaline Phosphatase 59 55 - 135 U/L    AST 23 10 - 40 U/L    ALT 12 10 - 44 U/L    eGFR >60 >60 mL/min/1.73 m^2    Anion Gap 8 8 - 16 mmol/L         Current Outpatient Medications:     cholecalciferol, vitamin D3, (VITAMIN D3) 1,000 unit capsule, Take 1,000 Units by mouth once daily., Disp: , Rfl:     clobetasoL (TEMOVATE) 0.05 % external solution, Apply topically nightly as needed., Disp: , Rfl:     finasteride (PROSCAR) 5 mg tablet, Take 5 mg by mouth., Disp: , Rfl:     FLUoxetine 10 MG capsule, Take 10 mg by mouth., Disp: , Rfl:     ibuprofen (IBU) 800 MG tablet, Take 1 tablet (800 mg total) by mouth every 6 (six) hours as needed for Pain., Disp: 20 tablet, Rfl: 0    ketoconazole (NIZORAL) 2 % shampoo, Apply topically., Disp: , Rfl:     levothyroxine (SYNTHROID) 137 MCG Tab tablet, TAKE ONE TABLET EVERY DAYBEFORE BREAKFAST WITH    EXTRA 1/2 TABLET ONCE PERWEEK, Disp: 30 tablet, Rfl: 10    LINZESS 145 mcg Cap, , Disp: , Rfl:     niacin 50 MG Tab, Take 100 mg by mouth every evening., Disp: , Rfl:     omega-3 fatty acids-vitamin E (FISH OIL) 1,000 mg Cap, Take 1,000 Units by  mouth., Disp: , Rfl:     rosuvastatin (CRESTOR) 20 MG tablet, Take 20 mg by mouth once daily., Disp: , Rfl:     valsartan-hydrochlorothiazide (DIOVAN-HCT) 160-12.5 mg per tablet, Take 1 tablet by mouth once daily., Disp: 90 tablet, Rfl: 3    furosemide (LASIX) 20 MG tablet, Take 20 mg by mouth., Disp: , Rfl:      Lab Results   Component Value Date    WBC 6.21 08/22/2021    RBC 4.22 08/22/2021    HGB 13.2 08/22/2021    HCT 41.6 08/22/2021    MCV 99 (H) 08/22/2021    MCH 31.3 (H) 08/22/2021    MCHC 31.7 (L) 08/22/2021    RDW 13.4 08/22/2021     08/22/2021    MPV 11.2 08/22/2021    GRAN 3.8 08/22/2021    GRAN 61.1 08/22/2021    LYMPH 1.6 08/22/2021    LYMPH 25.9 08/22/2021    MONO 0.5 08/22/2021    MONO 8.1 08/22/2021    EOS 0.2 08/22/2021    BASO 0.06 08/22/2021    EOSINOPHIL 3.7 08/22/2021    BASOPHIL 1.0 08/22/2021    MG 2.1 08/27/2020        CMP  Lab Results   Component Value Date     03/18/2024    K 4.2 03/18/2024     03/18/2024    CO2 30 (H) 03/18/2024    GLU 93 03/18/2024    BUN 20 03/18/2024    CREATININE 0.9 03/18/2024    CALCIUM 9.6 03/18/2024    PROT 7.1 03/18/2024    ALBUMIN 3.7 03/18/2024    BILITOT 0.5 03/18/2024    ALKPHOS 59 03/18/2024    AST 23 03/18/2024    ALT 12 03/18/2024    ANIONGAP 8 03/18/2024    ESTGFRAFRICA 54 (A) 08/22/2021    EGFRNONAA 47 (A) 08/22/2021        Lab Results   Component Value Date    LABURIN No growth 06/09/2022            Results for orders placed or performed in visit on 10/17/23   IN OFFICE EKG 12-LEAD (to [place])    Collection Time: 10/17/23  2:22 PM    Narrative    Test Reason : E03.9,    Vent. Rate : 061 BPM     Atrial Rate : 061 BPM     P-R Int : 156 ms          QRS Dur : 130 ms      QT Int : 408 ms       P-R-T Axes : 028 -16 -42 degrees     QTc Int : 410 ms    Normal sinus rhythm  Intraventricular conduction disturbance, nonspecific type  Abnormal ECG  When compared with ECG of 22-AUG-2021 13:26,  Premature atrial complexes are no longer Present  Right  bundle branch block is no longer Present  Confirmed by Scott Anderson MD ([id number]) on 10/18/2023 3:02:27 PM    Referred By: [email or web address]   SELF           Confirmed By:Scott Anderson MD                  Plan:       Problem List Items Addressed This Visit          Cardiac/Vascular    Dyslipidemia     Rosuvastatin 20 mg will be continued.  She is also on Omega fish oil therapy.  Labs recommended to be performed today.  Pretreatment  mg%.         Cardiomyopathy     Repeat echo ordered.  Previous EF 40% in 2022.  She is considered nonischemic cardiomyopathy with negative stress test in the past 2022.         Relevant Orders    Echo    Hypertension     Diovan/HCTZ to continue.  Blood pressure readings have been stable.         Mitral valve insufficiency and aortic valve insufficiency     Repeat echo ordered.  There is aortic stenosis documented as well.         Aortic atherosclerosis     Condition unchanged         Chronic combined systolic and diastolic congestive heart failure     Repeat echo ordered.  Previous EF 40% by echo 2022 May.  She has not required diuretic therapy.            Other Visit Diagnoses       Hyperlipidemia, unspecified hyperlipidemia type    -  Primary    Relevant Orders    Lipid Panel (Completed)    Comprehensive Metabolic Panel (Completed)               Reassurance provided to the patient regarding Diovan as cause of fatigue symptoms.  I am repeating her echo.  Her ejection fraction may have improved.  She still has an aortic stenosis murmur.  Repeat echo advised.             Scott Anderson MD  03/19/2024   2:19 PM

## 2024-03-19 PROBLEM — I50.42 CHRONIC COMBINED SYSTOLIC AND DIASTOLIC CONGESTIVE HEART FAILURE: Status: ACTIVE | Noted: 2024-03-19

## 2024-03-19 NOTE — ASSESSMENT & PLAN NOTE
Rosuvastatin 20 mg will be continued.  She is also on Omega fish oil therapy.  Labs recommended to be performed today.  Pretreatment  mg%.

## 2024-03-19 NOTE — ASSESSMENT & PLAN NOTE
Repeat echo ordered.  Previous EF 40% in 2022.  She is considered nonischemic cardiomyopathy with negative stress test in the past 2022.

## 2024-03-21 ENCOUNTER — HOSPITAL ENCOUNTER (OUTPATIENT)
Dept: PULMONOLOGY | Facility: HOSPITAL | Age: 84
Discharge: HOME | End: 2024-03-21
Attending: INTERNAL MEDICINE
Payer: MEDICARE

## 2024-03-21 DIAGNOSIS — I42.9 CARDIOMYOPATHY, UNSPECIFIED TYPE: ICD-10-CM

## 2024-03-21 LAB
ASCENDING AORTA: 2.71 CM
AV INDEX (PROSTH): 0.46
AV MEAN GRADIENT: 8 MMHG
AV PEAK GRADIENT: 15 MMHG
AV VALVE AREA BY VELOCITY RATIO: 1.89 CM²
AV VALVE AREA: 2.01 CM²
AV VELOCITY RATIO: 0.43
CV ECHO LV RWT: 0.45 CM
DOP CALC AO PEAK VEL: 1.91 M/S
DOP CALC AO VTI: 43 CM
DOP CALC LVOT AREA: 4.4 CM2
DOP CALC LVOT DIAMETER: 2.37 CM
DOP CALC LVOT PEAK VEL: 0.82 M/S
DOP CALC LVOT STROKE VOLUME: 86.42 CM3
DOP CALC RVOT PEAK VEL: 0.74 M/S
DOP CALC RVOT VTI: 15.9 CM
DOP CALCLVOT PEAK VEL VTI: 19.6 CM
E WAVE DECELERATION TIME: 309.77 MSEC
E/A RATIO: 0.6
E/E' RATIO: 11.23 M/S
ECHO LV POSTERIOR WALL: 1.1 CM (ref 0.6–1.1)
FRACTIONAL SHORTENING: 18 % (ref 28–44)
INTERVENTRICULAR SEPTUM: 0.85 CM (ref 0.6–1.1)
IVRT: 190.29 MSEC
LA MAJOR: 4.49 CM
LA MINOR: 4.28 CM
LA WIDTH: 3.5 CM
LEFT ATRIUM SIZE: 4.11 CM
LEFT ATRIUM VOLUME MOD: 40.44 CM3
LEFT ATRIUM VOLUME: 53.59 CM3
LEFT INTERNAL DIMENSION IN SYSTOLE: 4.02 CM (ref 2.1–4)
LEFT VENTRICLE DIASTOLIC VOLUME: 113.96 ML
LEFT VENTRICLE SYSTOLIC VOLUME: 70.74 ML
LEFT VENTRICULAR INTERNAL DIMENSION IN DIASTOLE: 4.92 CM (ref 3.5–6)
LEFT VENTRICULAR MASS: 171.28 G
LV LATERAL E/E' RATIO: 14.6 M/S
LV SEPTAL E/E' RATIO: 9.13 M/S
LVOT MG: 1.9 MMHG
LVOT MV: 0.66 CM/S
MV PEAK A VEL: 1.21 M/S
MV PEAK E VEL: 0.73 M/S
MV STENOSIS PRESSURE HALF TIME: 89.83 MS
MV VALVE AREA P 1/2 METHOD: 2.45 CM2
PISA TR MAX VEL: 1.95 M/S
PULM VEIN S/D RATIO: 1.91
PV MEAN GRADIENT: 1 MMHG
PV MV: 0.73 M/S
PV PEAK D VEL: 0.33 M/S
PV PEAK GRADIENT: 4 MMHG
PV PEAK S VEL: 0.63 M/S
PV PEAK VELOCITY: 1.01 M/S
RA MAJOR: 5.97 CM
RA PRESSURE ESTIMATED: 3 MMHG
RIGHT VENTRICULAR END-DIASTOLIC DIMENSION: 3.03 CM
RV TB RVSP: 5 MMHG
SINUS: 2.65 CM
STJ: 2.6 CM
TDI LATERAL: 0.05 M/S
TDI SEPTAL: 0.08 M/S
TDI: 0.07 M/S
TR MAX PG: 15 MMHG
TV REST PULMONARY ARTERY PRESSURE: 18 MMHG

## 2024-03-21 PROCEDURE — 93306 TTE W/DOPPLER COMPLETE: CPT | Mod: 26,,, | Performed by: INTERNAL MEDICINE

## 2024-03-21 PROCEDURE — 93306 TTE W/DOPPLER COMPLETE: CPT

## 2024-03-21 RX ORDER — VALSARTAN AND HYDROCHLOROTHIAZIDE 160; 12.5 MG/1; MG/1
1 TABLET, FILM COATED ORAL DAILY
Qty: 90 TABLET | Refills: 3
Start: 2024-03-21 | End: 2024-05-02 | Stop reason: SDUPTHER

## 2024-03-22 ENCOUNTER — PATIENT MESSAGE (OUTPATIENT)
Dept: CARDIOLOGY | Facility: CLINIC | Age: 84
End: 2024-03-22
Payer: MEDICARE

## 2024-04-18 ENCOUNTER — LAB VISIT (OUTPATIENT)
Dept: LAB | Facility: HOSPITAL | Age: 84
End: 2024-04-18
Attending: INTERNAL MEDICINE
Payer: MEDICARE

## 2024-04-18 DIAGNOSIS — E03.9 HYPOTHYROIDISM: Primary | ICD-10-CM

## 2024-04-18 LAB
T4 FREE SERPL-MCNC: 1.03 NG/DL (ref 0.71–1.51)
TSH SERPL DL<=0.005 MIU/L-ACNC: 5.01 UIU/ML (ref 0.4–4)

## 2024-04-18 PROCEDURE — 36415 COLL VENOUS BLD VENIPUNCTURE: CPT | Performed by: INTERNAL MEDICINE

## 2024-04-18 PROCEDURE — 84443 ASSAY THYROID STIM HORMONE: CPT | Performed by: INTERNAL MEDICINE

## 2024-04-18 PROCEDURE — 84439 ASSAY OF FREE THYROXINE: CPT | Performed by: INTERNAL MEDICINE

## 2024-05-02 RX ORDER — VALSARTAN AND HYDROCHLOROTHIAZIDE 160; 12.5 MG/1; MG/1
1 TABLET, FILM COATED ORAL DAILY
Start: 2024-05-02 | End: 2024-05-06 | Stop reason: SDUPTHER

## 2024-05-04 RX ORDER — VALSARTAN AND HYDROCHLOROTHIAZIDE 160; 12.5 MG/1; MG/1
1 TABLET, FILM COATED ORAL DAILY
Qty: 90 TABLET | Refills: 3 | Status: CANCELLED | OUTPATIENT
Start: 2024-05-04 | End: 2025-05-04

## 2024-05-05 NOTE — TELEPHONE ENCOUNTER
----- Message from Leticia Camargo sent at 5/3/2024 12:44 PM CDT -----  Contact: Patient  Giselle Rios  MRN: [id number]  : 1940  PCP: Rina Melara  Home Phone      Not on file.  Work Phone      Not on file.  Mobile          [phone]      MESSAGE: Patient needs the prescription for valsartan-hydrochlorothiazide sent to [facility].     PHONE; [phone]

## 2024-05-06 RX ORDER — VALSARTAN AND HYDROCHLOROTHIAZIDE 160; 12.5 MG/1; MG/1
1 TABLET, FILM COATED ORAL
Qty: 30 TABLET | Refills: 1 | OUTPATIENT
Start: 2024-05-06

## 2024-05-06 RX ORDER — VALSARTAN AND HYDROCHLOROTHIAZIDE 160; 12.5 MG/1; MG/1
1 TABLET, FILM COATED ORAL DAILY
Start: 2024-05-06 | End: 2024-05-07 | Stop reason: SDUPTHER

## 2024-05-06 RX ORDER — ROSUVASTATIN CALCIUM 20 MG/1
20 TABLET, COATED ORAL DAILY
Qty: 90 TABLET | Refills: 3 | Status: SHIPPED | OUTPATIENT
Start: 2024-05-06

## 2024-05-07 RX ORDER — VALSARTAN AND HYDROCHLOROTHIAZIDE 160; 12.5 MG/1; MG/1
1 TABLET, FILM COATED ORAL DAILY
Qty: 90 TABLET | Refills: 3
Start: 2024-05-07 | End: 2025-05-07

## 2024-06-10 ENCOUNTER — HOSPITAL ENCOUNTER (OUTPATIENT)
Dept: RADIOLOGY | Facility: HOSPITAL | Age: 84
Discharge: HOME | End: 2024-06-10
Attending: NURSE PRACTITIONER
Payer: MEDICARE

## 2024-06-10 DIAGNOSIS — M25.551 PAIN IN RIGHT HIP: ICD-10-CM

## 2024-06-10 DIAGNOSIS — M25.551 PAIN IN RIGHT HIP: Primary | ICD-10-CM

## 2024-06-10 PROCEDURE — 73502 X-RAY EXAM HIP UNI 2-3 VIEWS: CPT | Mod: 26,RT,, | Performed by: RADIOLOGY

## 2024-06-10 PROCEDURE — 73502 X-RAY EXAM HIP UNI 2-3 VIEWS: CPT | Mod: TC,RT

## 2024-07-05 ENCOUNTER — HOSPITAL ENCOUNTER (EMERGENCY)
Facility: HOSPITAL | Age: 84
Discharge: HOME | End: 2024-07-05
Attending: SURGERY
Payer: MEDICARE

## 2024-07-05 VITALS
SYSTOLIC BLOOD PRESSURE: 141 MMHG | RESPIRATION RATE: 17 BRPM | DIASTOLIC BLOOD PRESSURE: 77 MMHG | HEIGHT: 68 IN | WEIGHT: 175 LBS | BODY MASS INDEX: 26.52 KG/M2 | OXYGEN SATURATION: 96 % | TEMPERATURE: 98 F | HEART RATE: 63 BPM
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
## 2024-07-05 DIAGNOSIS — S39.012A STRAIN OF LUMBAR REGION, INITIAL ENCOUNTER: ICD-10-CM

## 2024-07-05 DIAGNOSIS — M25.552 LEFT HIP PAIN: ICD-10-CM

## 2024-07-05 PROCEDURE — 99284 EMERGENCY DEPT VISIT MOD MDM: CPT | Mod: 25

## 2024-07-05 NOTE — ED PROVIDER NOTES
Encounter Date: 7/5/2024       History     Chief Complaint   Patient presents with    Fall     Pt reports she went to  her great grandchild yesterday and had trouble, and slipped and fell backwards. Complains of left lower back pain radiating down left leg. Denies hitting head or LOC.     Giselle Rios is a 83 y.o. female with PMH of hypothyroidism, obesity presenting to the ED for evaluation of lower back and left hip pain after fall.  Patient reports that she was playing with her granddaughter today when she tried to pick her up and did not realize how heavy she is causing her to fall backwards.  There was no head trauma or LOC.  She reports that she landed on her lower back.  She presents with an aching pain to her lower back with radiation into her left hip.  Pain is currently rated 6/10 in severity.  She reports that movement and ambulation exacerbates pain.  Denies alleviating factors.  She did take 800 mg of ibuprofen PTA.    The history is provided by the patient.     Review of patient's allergies indicates:  No Known Allergies  Past Medical History:   Diagnosis Date    Hypothyroidism     Obesity      Past Surgical History:   Procedure Laterality Date    APPENDECTOMY      cervical fusion x 2      HYSTERECTOMY       Family History   Problem Relation Name Age of Onset    Thyroid nodules Father      Diabetes Sister      Nephrolithiasis Daughter      Breast cancer Daughter      Thyroid disease Paternal Uncle      Diabetes Maternal Grandmother       Social History     Tobacco Use    Smoking status: Never    Smokeless tobacco: Never   Substance Use Topics    Alcohol use: No    Drug use: No     Review of Systems   Constitutional:  Negative for activity change, chills and fever.   HENT:  Negative for congestion, ear discharge, ear pain, postnasal drip, sinus pressure, sinus pain and sore throat.    Respiratory:  Negative for cough, chest tightness and shortness of breath.    Cardiovascular:  Negative for  chest pain.   Gastrointestinal:  Negative for abdominal distention, abdominal pain and nausea.   Genitourinary:  Negative for dysuria, frequency and urgency.   Musculoskeletal:  Positive for arthralgias and back pain.   Skin:  Negative for rash.   Neurological:  Negative for dizziness, weakness, light-headedness and numbness.   Hematological:  Does not bruise/bleed easily.       Physical Exam     Initial Vitals [07/05/24 1455]   BP Pulse Resp Temp SpO2   (!) 141/77 63 17 98.1 °F (36.7 °C) 96 %      MAP       --         Physical Exam    Nursing note and vitals reviewed.  Constitutional: She appears well-developed and well-nourished.   HENT:   Head: Normocephalic and atraumatic.   Eyes: Conjunctivae and EOM are normal. Pupils are equal, round, and reactive to light.   Neck: Neck supple.   Cardiovascular:  Normal rate, regular rhythm, normal heart sounds and intact distal pulses.           Pulmonary/Chest: Breath sounds normal.   Abdominal: Abdomen is soft. Bowel sounds are normal.   Musculoskeletal:         General: Normal range of motion.      Cervical back: Neck supple.     Neurological: She is alert and oriented to person, place, and time. She has normal strength.   Skin: Skin is warm and dry.   Psychiatric: She has a normal mood and affect. Her behavior is normal. Judgment and thought content normal.         ED Course   Procedures  Labs Reviewed - No data to display       Imaging Results              X-Ray Lumbar Spine Ap And Lateral (Final result)  Result time 07/05/24 15:40:08      Final result by Ariel Medina MD (07/05/24 15:40:08)                   Impression:      1. Mild lumbar scoliosis.  2. Old superior endplate compression fracture of the L1 vertebral body.  3. Multilevel degenerative changes of the lumbar spine.      Electronically signed by: Ariel Medina MD  Date:    07/05/2024  Time:    15:40               Narrative:    EXAMINATION:  XR LUMBAR SPINE AP AND LATERAL    TECHNIQUE:  AP, lateral  and spot images were performed of the lumbar spine.    COMPARISON:  None    FINDINGS:  There is mild lumbar scoliosis convex to the left.  Alignment is otherwise satisfactory.  There is a moderate superior endplate compression fracture of the L1 vertebral body which is unchanged when compared to a CT of the abdomen/pelvis dated 09/08/2020.  No new compression fractures have developed.  There are degenerative changes throughout the lumbar spine with vertebral endplate osteophyte formation.  There is also degenerative facet arthrosis at the lower 3 lumbar levels.                                       X-Ray Hip 2 or 3 views Left with Pelvis when performed (Final result)  Result time 07/05/24 15:40:54      Final result by Sarah Campbell MD (07/05/24 15:40:54)                   Impression:      As above      Electronically signed by: Dylan Campbell MD  Date:    07/05/2024  Time:    15:40               Narrative:    EXAMINATION:  XR HIP WITH PELVIS WHEN PERFORMED 2 OR 3 VIEWS LEFT    CLINICAL HISTORY:  Pain in left hip    TECHNIQUE:  AP view of the pelvis and frog leg lateral view of the left hip were performed.    COMPARISON:  Pelvis and right hip-06/10/2024    FINDINGS:  The bones are osteopenic.  There is degenerative change of the lower lumbar spine in the form of marginal osteophyte formation and degenerative facet arthropathy..  No hip joint space narrowing.  There is right rectus femoris calcific tendinitis at the lateral margin of the right acetabulum, similar to the prior study.  No radiographically evident osteonecrosis of the femoral heads.  There is bilateral gluteus tendon insertion greater trochanteric enthesophyte formation.  No acute fracture, dislocation, or osseous destructive process.  There is left iliac wing enthesophyte formation.  There is degenerative change versus CPPD of the symphysis pubis with chondrocalcinosis noted in the symphysis pubis.                                       Medications  - No data to display  Medical Decision Making  Evaluation of an 83-year-old female with accidental fall at home PTA presenting with lower back and left hip pain.  She presents with stable vital signs. + left paraspinous muscle tenderness with palpation.  No step-off or deformity or evidence of cauda equina syndrome.  Good distal pulses    Differential diagnosis includes lumbar strain, compression fracture, hip fracture, contusion    Problems Addressed:  Fall, initial encounter: acute illness or injury  Left hip pain: acute illness or injury  Strain of lumbar region, initial encounter: acute illness or injury    Amount and/or Complexity of Data Reviewed  Radiology: ordered. Decision-making details documented in ED Course.    Risk  Risk Details: Stable for discharge home.  X-rays of the lumbar spine and left hip show no acute findings.  Tylenol/ibuprofen at home as directed for pain control. Patient/caregiver voices understanding and feels comfortable with discharge plan.      The patient acknowledges that close follow up with medical provider is required. Instructed to follow up with PCP within 2 days. Patient was given specific return precautions. The patient agrees to comply with all instruction and directions given in the ER.                                        Clinical Impression:  Final diagnoses:  [M25.552] Left hip pain  [W19.XXXA] Fall, initial encounter (Primary)  [S39.012A] Strain of lumbar region, initial encounter          ED Disposition Condition    Discharge Stable          ED Prescriptions    None       Follow-up Information       Follow up With Specialties Details Why Contact Info    Rina Melara MD Family Medicine Schedule an appointment as soon as possible for a visit in 2 days  [id number]   [place] [id number]  [phone]               Maris Hewitt NP  07/05/24 4818

## 2024-07-26 ENCOUNTER — HOSPITAL ENCOUNTER (OUTPATIENT)
Dept: RADIOLOGY | Facility: HOSPITAL | Age: 84
Discharge: HOME | End: 2024-07-26
Attending: PAIN MEDICINE
Payer: MEDICARE

## 2024-07-26 DIAGNOSIS — M54.16 LUMBAR RADICULOPATHY: ICD-10-CM

## 2024-07-26 DIAGNOSIS — M54.14 THORACIC RADICULITIS: ICD-10-CM

## 2024-07-26 PROCEDURE — 72195 MRI PELVIS W/O DYE: CPT | Mod: TC

## 2024-07-26 PROCEDURE — 72148 MRI LUMBAR SPINE W/O DYE: CPT | Mod: 26,,, | Performed by: RADIOLOGY

## 2024-07-26 PROCEDURE — 72070 X-RAY EXAM THORAC SPINE 2VWS: CPT | Mod: 26,,, | Performed by: RADIOLOGY

## 2024-07-26 PROCEDURE — 72070 X-RAY EXAM THORAC SPINE 2VWS: CPT | Mod: TC

## 2024-07-26 PROCEDURE — 72202 X-RAY EXAM SI JOINTS 3/> VWS: CPT | Mod: TC

## 2024-07-26 PROCEDURE — 72148 MRI LUMBAR SPINE W/O DYE: CPT | Mod: TC

## 2024-07-26 PROCEDURE — 72195 MRI PELVIS W/O DYE: CPT | Mod: 26,,, | Performed by: STUDENT IN AN ORGANIZED HEALTH CARE EDUCATION/TRAINING PROGRAM

## 2024-07-26 PROCEDURE — 72202 X-RAY EXAM SI JOINTS 3/> VWS: CPT | Mod: 26,,, | Performed by: RADIOLOGY

## 2024-08-19 ENCOUNTER — HOSPITAL ENCOUNTER (OUTPATIENT)
Dept: RADIOLOGY | Facility: HOSPITAL | Age: 84
Discharge: HOME | End: 2024-08-19
Attending: PAIN MEDICINE
Payer: MEDICARE

## 2024-08-19 DIAGNOSIS — M54.14 RADICULOPATHY, THORACIC REGION: ICD-10-CM

## 2024-08-19 PROCEDURE — 72070 X-RAY EXAM THORAC SPINE 2VWS: CPT | Mod: TC

## 2024-08-19 PROCEDURE — 72146 MRI CHEST SPINE W/O DYE: CPT | Mod: TC

## 2024-08-19 PROCEDURE — 72146 MRI CHEST SPINE W/O DYE: CPT | Mod: 26,,, | Performed by: RADIOLOGY

## 2024-08-19 PROCEDURE — 72070 X-RAY EXAM THORAC SPINE 2VWS: CPT | Mod: 26,,, | Performed by: RADIOLOGY

## 2024-09-10 ENCOUNTER — OFFICE VISIT (OUTPATIENT)
Dept: CARDIOLOGY | Facility: CLINIC | Age: 84
End: 2024-09-10
Payer: MEDICARE

## 2024-09-10 VITALS
WEIGHT: 176 LBS | SYSTOLIC BLOOD PRESSURE: 126 MMHG | DIASTOLIC BLOOD PRESSURE: 61 MMHG | HEART RATE: 51 BPM | BODY MASS INDEX: 26.67 KG/M2 | HEIGHT: 68 IN

## 2024-09-10 DIAGNOSIS — I25.10 CORONARY ARTERY DISEASE INVOLVING NATIVE CORONARY ARTERY OF NATIVE HEART WITHOUT ANGINA PECTORIS: ICD-10-CM

## 2024-09-10 DIAGNOSIS — I50.42 CHRONIC COMBINED SYSTOLIC AND DIASTOLIC CONGESTIVE HEART FAILURE: ICD-10-CM

## 2024-09-10 DIAGNOSIS — I70.0 AORTIC ATHEROSCLEROSIS: ICD-10-CM

## 2024-09-10 DIAGNOSIS — I35.9 AORTIC VALVE DISEASE: ICD-10-CM

## 2024-09-10 DIAGNOSIS — R00.1 BRADYCARDIA: Primary | ICD-10-CM

## 2024-09-10 DIAGNOSIS — E78.5 DYSLIPIDEMIA: ICD-10-CM

## 2024-09-10 DIAGNOSIS — I34.0 NONRHEUMATIC MITRAL VALVE REGURGITATION: ICD-10-CM

## 2024-09-10 DIAGNOSIS — R94.31 ABNORMAL EKG: ICD-10-CM

## 2024-09-10 DIAGNOSIS — I10 PRIMARY HYPERTENSION: ICD-10-CM

## 2024-09-10 PROBLEM — I08.0 MITRAL VALVE INSUFFICIENCY AND AORTIC VALVE INSUFFICIENCY: Status: RESOLVED | Noted: 2023-10-17 | Resolved: 2024-09-10

## 2024-09-10 PROCEDURE — 1126F AMNT PAIN NOTED NONE PRSNT: CPT | Mod: CPTII,S$GLB,, | Performed by: INTERNAL MEDICINE

## 2024-09-10 PROCEDURE — 3078F DIAST BP <80 MM HG: CPT | Mod: CPTII,S$GLB,, | Performed by: INTERNAL MEDICINE

## 2024-09-10 PROCEDURE — 99999 PR PBB SHADOW E&M-EST. PATIENT-LVL III: CPT | Mod: PBBFAC,,, | Performed by: INTERNAL MEDICINE

## 2024-09-10 PROCEDURE — 3288F FALL RISK ASSESSMENT DOCD: CPT | Mod: CPTII,S$GLB,, | Performed by: INTERNAL MEDICINE

## 2024-09-10 PROCEDURE — 99214 OFFICE O/P EST MOD 30 MIN: CPT | Mod: S$GLB,,, | Performed by: INTERNAL MEDICINE

## 2024-09-10 PROCEDURE — 1101F PT FALLS ASSESS-DOCD LE1/YR: CPT | Mod: CPTII,S$GLB,, | Performed by: INTERNAL MEDICINE

## 2024-09-10 PROCEDURE — 1159F MED LIST DOCD IN RCRD: CPT | Mod: CPTII,S$GLB,, | Performed by: INTERNAL MEDICINE

## 2024-09-10 PROCEDURE — 3074F SYST BP LT 130 MM HG: CPT | Mod: CPTII,S$GLB,, | Performed by: INTERNAL MEDICINE

## 2024-09-10 NOTE — ASSESSMENT & PLAN NOTE
She has intermittent right bundle branch block.  Her most recent Electrocardiogram did show incomplete left bundle branch block, IVCD type abnormality.    Holter monitor ordered.  She is having weakness episodes lasting minutes.

## 2024-09-10 NOTE — ASSESSMENT & PLAN NOTE
Crestor 20 mg and Omega fish oil therapy to be continued.  She is considered primary prevention from a coronary artery disease standpoint.

## 2024-09-10 NOTE — ASSESSMENT & PLAN NOTE
Coronary calcifications noted on imaging studies of the chest.  No symptoms of angina.  Normal perfusion stress test 2022.

## 2024-09-10 NOTE — PROGRESS NOTES
[facility]     Subjective:    Patient ID:  Giselle Rios is a 84 y.o. female who presents for follow-up of Hypertension, Valvular Heart Disease, Hyperlipidemia, and Coronary Artery Disease    Review of patient's allergies indicates:  No Known Allergies  She is followed for hypertension.  There is history of reduced EF in 2022 40% by echo.  Her perfusion scan was normal at that time.  There is no history of coronary disease.  A repeat echo was done in follow-up March 20, 2024 confirming ejection fraction 40% with mild aortic valve disease mild mitral regurgitation without pulmonary hypertension.  She stopped furosemide.  She is on Diovan-HCTZ for blood pressure.    She is complaining of weakness episodes lasting a short period of time-minutes only.  It happens about once a week.  She checks her vital signs during symptoms and her blood pressure is low.  She forgot her readings that she had written down.  She has never had syncope.  She does not have nausea or diaphoresis.  After recovery of symptoms the patient states that she is able to go on her way as if it never occurred.  Today's heart rate is 51 beats per minute.  She is not on beta-blockers.  Her LDL is 81 mg% she takes Crestor 20 mg and Pioneer fish oil therapy.         Review of Systems   Constitutional: Negative for chills, decreased appetite, diaphoresis, fever, malaise/fatigue, night sweats, weight gain and weight loss.   HENT:  Negative for congestion, ear discharge, ear pain, hearing loss, hoarse voice, nosebleeds, odynophagia, sore throat, stridor and tinnitus.    Eyes:  Negative for blurred vision, discharge, double vision, pain, photophobia, redness, vision loss in left eye, vision loss in right eye, visual disturbance and visual halos.   Cardiovascular:  Positive for near-syncope. Negative for chest pain, claudication, cyanosis, dyspnea on exertion, irregular  "heartbeat, leg swelling, orthopnea, palpitations, paroxysmal nocturnal dyspnea and syncope.   Respiratory:  Negative for cough, hemoptysis, shortness of breath, sleep disturbances due to breathing, snoring, sputum production and wheezing.    Endocrine: Negative for cold intolerance, heat intolerance, polydipsia, polyphagia and polyuria.   Hematologic/Lymphatic: Negative for adenopathy and bleeding problem. Does not bruise/bleed easily.   Skin:  Negative for color change, dry skin, flushing, itching, nail changes, poor wound healing, rash, skin cancer, suspicious lesions and unusual hair distribution.   Musculoskeletal:  Negative for arthritis, back pain, falls, gout, joint pain, joint swelling, muscle cramps, muscle weakness, myalgias, neck pain and stiffness.   Gastrointestinal:  Negative for bloating, abdominal pain, anorexia, change in bowel habit, bowel incontinence, constipation, diarrhea, dysphagia, excessive appetite, flatus, heartburn, hematemesis, hematochezia, hemorrhoids, jaundice, melena, nausea and vomiting.   Genitourinary:  Negative for bladder incontinence, decreased libido, dysuria, flank pain, frequency, genital sores, hematuria, hesitancy, incomplete emptying, nocturia and urgency.   Neurological:  Negative for aphonia, brief paralysis, difficulty with concentration, disturbances in coordination, excessive daytime sleepiness, dizziness, focal weakness, headaches, light-headedness, loss of balance, numbness, paresthesias, seizures, sensory change, tremors, vertigo and weakness.   Psychiatric/Behavioral:  Negative for altered mental status, depression, hallucinations, memory loss, substance abuse, suicidal ideas and thoughts of violence. The patient does not have insomnia and is not nervous/anxious.    Allergic/Immunologic: Negative for hives and persistent infections.        Objective:       Vitals:    09/10/24 0851   BP: 126/61   Pulse: (!) 51   Weight: 79.8 kg (176 lb)   Height: 5' 8" (1.727 m) "    Physical Exam  Constitutional:       General: She is not in acute distress.     Appearance: She is well-developed. She is not diaphoretic.   HENT:      Head: Normocephalic and atraumatic.      Nose: Nose normal.   Eyes:      General: No scleral icterus.        Right eye: No discharge.      Conjunctiva/sclera: Conjunctivae normal.      Pupils: Pupils are equal, round, and reactive to light.   Neck:      Thyroid: No thyromegaly.      Vascular: No JVD.      Trachea: No tracheal deviation.   Cardiovascular:      Rate and Rhythm: Regular rhythm. Bradycardia present.      Pulses:           Carotid pulses are 2+ on the right side and 2+ on the left side.       Radial pulses are 2+ on the right side and 2+ on the left side.        Dorsalis pedis pulses are 2+ on the right side and 2+ on the left side.        Posterior tibial pulses are 2+ on the right side and 2+ on the left side.      Heart sounds: Normal heart sounds. No murmur heard.     No friction rub. No gallop.   Pulmonary:      Effort: Pulmonary effort is normal. No respiratory distress.      Breath sounds: Normal breath sounds. No stridor. No wheezing or rales.   Chest:      Chest wall: No tenderness.   Abdominal:      General: Bowel sounds are normal. There is no distension.      Palpations: Abdomen is soft. There is no mass.      Tenderness: There is no abdominal tenderness. There is no guarding or rebound.   Musculoskeletal:         General: No tenderness. Normal range of motion.      Cervical back: Normal range of motion and neck supple.   Lymphadenopathy:      Cervical: No cervical adenopathy.   Skin:     General: Skin is warm and dry.      Coloration: Skin is not pale.      Findings: No erythema or rash.   Neurological:      Mental Status: She is alert and oriented to person, place, and time.      Cranial Nerves: No cranial nerve deficit.      Coordination: Coordination normal.   Psychiatric:         Behavior: Behavior normal.         Thought Content:  Thought content normal.         Judgment: Judgment normal.           Assessment:       1. Bradycardia    2. Aortic valve disease    3. Nonrheumatic mitral valve regurgitation    4. Aortic atherosclerosis    5. Abnormal EKG    6. Dyslipidemia    7. Primary hypertension    8. Coronary artery disease involving native coronary artery of native heart without angina pectoris    9. Chronic combined systolic and diastolic congestive heart failure      Results for orders placed or performed in visit on 04/18/24   TSH   Result Value Ref Range    TSH 5.014 (H) 0.400 - 4.000 uIU/mL   T4, Free   Result Value Ref Range    Free T4 1.03 0.71 - 1.51 ng/dL         Current Outpatient Medications:     cholecalciferol, vitamin D3, (VITAMIN D3) 1,000 unit capsule, Take 1,000 Units by mouth once daily., Disp: , Rfl:     clobetasoL (TEMOVATE) 0.05 % external solution, Apply topically nightly as needed., Disp: , Rfl:     finasteride (PROSCAR) 5 mg tablet, Take 5 mg by mouth., Disp: , Rfl:     ibuprofen (IBU) 800 MG tablet, Take 1 tablet (800 mg total) by mouth every 6 (six) hours as needed for Pain., Disp: 20 tablet, Rfl: 0    ketoconazole (NIZORAL) 2 % shampoo, Apply topically., Disp: , Rfl:     levothyroxine (SYNTHROID) 137 MCG Tab tablet, TAKE ONE TABLET EVERY DAYBEFORE BREAKFAST WITH    EXTRA 1/2 TABLET ONCE PERWEEK, Disp: 30 tablet, Rfl: 10    LINZESS 145 mcg Cap, , Disp: , Rfl:     niacin 50 MG Tab, Take 100 mg by mouth every evening., Disp: , Rfl:     omega-3 fatty acids-vitamin E (FISH OIL) 1,000 mg Cap, Take 1,000 Units by mouth., Disp: , Rfl:     rosuvastatin (CRESTOR) 20 MG tablet, Take 1 tablet (20 mg total) by mouth once daily., Disp: 90 tablet, Rfl: 3    valsartan-hydrochlorothiazide (DIOVAN-HCT) 160-12.5 mg per tablet, Take 1 tablet by mouth once daily., Disp: 90 tablet, Rfl: 3     Lab Results   Component Value Date    WBC 6.21 08/22/2021    RBC 4.22 08/22/2021    HGB 13.2 08/22/2021    HCT 41.6 08/22/2021    MCV 99 (H)  08/22/2021    MCH 31.3 (H) 08/22/2021    MCHC 31.7 (L) 08/22/2021    RDW 13.4 08/22/2021     08/22/2021    MPV 11.2 08/22/2021    GRAN 3.8 08/22/2021    GRAN 61.1 08/22/2021    LYMPH 1.6 08/22/2021    LYMPH 25.9 08/22/2021    MONO 0.5 08/22/2021    MONO 8.1 08/22/2021    EOS 0.2 08/22/2021    BASO 0.06 08/22/2021    EOSINOPHIL 3.7 08/22/2021    BASOPHIL 1.0 08/22/2021    MG 2.1 08/27/2020        CMP  Lab Results   Component Value Date     03/18/2024    K 4.2 03/18/2024     03/18/2024    CO2 30 (H) 03/18/2024    GLU 93 03/18/2024    BUN 20 03/18/2024    CREATININE 0.9 03/18/2024    CALCIUM 9.6 03/18/2024    PROT 7.1 03/18/2024    ALBUMIN 3.7 03/18/2024    BILITOT 0.5 03/18/2024    ALKPHOS 59 03/18/2024    AST 23 03/18/2024    ALT 12 03/18/2024    ANIONGAP 8 03/18/2024    ESTGFRAFRICA 54 (A) 08/22/2021    EGFRNONAA 47 (A) 08/22/2021        Lab Results   Component Value Date    LABURIN No growth 06/09/2022            Results for orders placed or performed in visit on 10/17/23   IN OFFICE EKG 12-LEAD (to [place])    Collection Time: 10/17/23  2:22 PM    Narrative    Test Reason : E03.9,    Vent. Rate : 061 BPM     Atrial Rate : 061 BPM     P-R Int : 156 ms          QRS Dur : 130 ms      QT Int : 408 ms       P-R-T Axes : 028 -16 -42 degrees     QTc Int : 410 ms    Normal sinus rhythm  Intraventricular conduction disturbance, nonspecific type  Abnormal ECG  When compared with ECG of 22-AUG-2021 13:26,  Premature atrial complexes are no longer Present  Right bundle branch block is no longer Present  Confirmed by Scott Anderson MD ([id number]) on 10/18/2023 3:02:27 PM    Referred By: AUGUSTO   SELF           Confirmed By:Scott Anderson MD                  Plan:       Problem List Items Addressed This Visit          Cardiac/Vascular    Dyslipidemia     Crestor 20 mg and Omega fish oil therapy to be continued.  She is considered primary prevention from a coronary artery disease standpoint.         Hypertension      Condition controlled.  She monitors her blood pressures at home.         Abnormal EKG     She has intermittent right bundle branch block.  Her most recent Electrocardiogram did show incomplete left bundle branch block, IVCD type abnormality.    Holter monitor ordered.  She is having weakness episodes lasting minutes.         Aortic atherosclerosis     Condition stable.         Chronic combined systolic and diastolic congestive heart failure     A previous reported EF by echo 40% in 2022.  Repeat echo EF 55-60%.           Aortic valve disease     Her echo in March 20, 2024 showed peak aortic velocity less than 2 m/sec.  Mild aortic valve stenosis noted.  She does not have a significant heart murmur.  Condition stable.         Nonrheumatic mitral valve regurgitation     Mild by echo.  No pulmonary hypertension.  The valve appearance anatomically appears without significant abnormalities.  Condition stable.  Reassurance given to the patient.         Bradycardia - Primary     Today's heart rate is 51 beats per minute-sinus bradycardia.  Holter monitor ordered.  She does have conduction disease.          Relevant Orders    Holter monitor - 48 hour    Coronary artery disease involving native coronary artery of native heart without angina pectoris     Coronary calcifications noted on imaging studies of the chest.  No symptoms of angina.  Normal perfusion stress test 2022.                  Holter monitor ordered.  Her complaints have features of vasovagal trigger but I can not be sure.  I am not thinking that she is having bradycardic spells but it is possible.    I support continued usage of Diovan-HCTZ.  Her blood pressures have been stable.  I recommended a four-month follow-up.  I will discuss Holter findings with her.    Etiology of reduced EF by echo unclear in 2022.  No further workup advised at this time.  Reassurance provided regarding her aortic and mitral valve disease.           Scott Anderson,  MD  09/10/2024   9:13 AM

## 2024-09-10 NOTE — ASSESSMENT & PLAN NOTE
Today's heart rate is 51 beats per minute-sinus bradycardia.  Holter monitor ordered.  She does have conduction disease.

## 2024-09-10 NOTE — ASSESSMENT & PLAN NOTE
Her echo in March 20, 2024 showed peak aortic velocity less than 2 m/sec.  Mild aortic valve stenosis noted.  She does not have a significant heart murmur.  Condition stable.

## 2024-09-10 NOTE — ASSESSMENT & PLAN NOTE
Mild by echo.  No pulmonary hypertension.  The valve appearance anatomically appears without significant abnormalities.  Condition stable.  Reassurance given to the patient.

## 2024-09-17 ENCOUNTER — HOSPITAL ENCOUNTER (OUTPATIENT)
Dept: RADIOLOGY | Facility: HOSPITAL | Age: 84
Discharge: HOME | End: 2024-09-17
Attending: NURSE PRACTITIONER
Payer: MEDICARE

## 2024-09-17 ENCOUNTER — HOSPITAL ENCOUNTER (OUTPATIENT)
Dept: PULMONOLOGY | Facility: HOSPITAL | Age: 84
Discharge: HOME | End: 2024-09-17
Attending: INTERNAL MEDICINE
Payer: MEDICARE

## 2024-09-17 DIAGNOSIS — R10.11 ABDOMINAL PAIN, RIGHT UPPER QUADRANT: ICD-10-CM

## 2024-09-17 DIAGNOSIS — R00.1 BRADYCARDIA: ICD-10-CM

## 2024-09-17 PROCEDURE — 76705 ECHO EXAM OF ABDOMEN: CPT | Mod: TC

## 2024-09-17 PROCEDURE — 93226 XTRNL ECG REC<48 HR SCAN A/R: CPT

## 2024-09-17 PROCEDURE — 76705 ECHO EXAM OF ABDOMEN: CPT | Mod: 26,,, | Performed by: RADIOLOGY

## 2024-09-17 PROCEDURE — 93227 XTRNL ECG REC<48 HR R&I: CPT | Mod: ,,, | Performed by: INTERNAL MEDICINE

## 2024-09-17 PROCEDURE — 93225 XTRNL ECG REC<48 HRS REC: CPT

## 2024-09-20 LAB
OHS CV EVENT MONITOR DAY: 0
OHS CV HOLTER LENGTH DECIMAL HOURS: 47.98
OHS CV HOLTER LENGTH HOURS: 47
OHS CV HOLTER LENGTH MINUTES: 59
OHS CV HOLTER SINUS AVERAGE HR: 59
OHS CV HOLTER SINUS MAX HR: 100
OHS CV HOLTER SINUS MIN HR: 44

## 2024-09-23 ENCOUNTER — PATIENT MESSAGE (OUTPATIENT)
Dept: CARDIOLOGY | Facility: HOSPITAL | Age: 84
End: 2024-09-23
Payer: MEDICARE

## 2024-09-25 ENCOUNTER — HOSPITAL ENCOUNTER (OUTPATIENT)
Dept: RADIOLOGY | Facility: HOSPITAL | Age: 84
Discharge: HOME | End: 2024-09-25
Attending: NURSE PRACTITIONER
Payer: MEDICARE

## 2024-09-25 DIAGNOSIS — Z79.1 LONG TERM (CURRENT) USE OF NON-STEROIDAL ANTI-INFLAMMATORIES (NSAID): ICD-10-CM

## 2024-09-25 PROCEDURE — 74240 X-RAY XM UPR GI TRC 1CNTRST: CPT | Mod: 26,,, | Performed by: RADIOLOGY

## 2024-09-25 PROCEDURE — 74248 X-RAY SM INT F-THRU STD: CPT | Mod: 26,,, | Performed by: RADIOLOGY

## 2024-09-25 PROCEDURE — A9698 NON-RAD CONTRAST MATERIALNOC: HCPCS

## 2024-09-25 PROCEDURE — 74248 X-RAY SM INT F-THRU STD: CPT | Mod: TC

## 2024-09-25 PROCEDURE — 25500020 PHARM REV CODE 255

## 2024-09-25 PROCEDURE — 74240 X-RAY XM UPR GI TRC 1CNTRST: CPT | Mod: TC

## 2024-09-25 RX ADMIN — BARIUM SULFATE 710 ML: 0.6 SUSPENSION ORAL at 09:09

## 2024-10-08 ENCOUNTER — HOSPITAL ENCOUNTER (OUTPATIENT)
Dept: RADIOLOGY | Facility: HOSPITAL | Age: 84
Discharge: HOME | End: 2024-10-08
Attending: NURSE PRACTITIONER
Payer: MEDICARE

## 2024-10-08 VITALS — HEIGHT: 68 IN | BODY MASS INDEX: 26.67 KG/M2 | WEIGHT: 176 LBS

## 2024-10-08 DIAGNOSIS — Z12.31 ENCOUNTER FOR SCREENING MAMMOGRAM FOR MALIGNANT NEOPLASM OF BREAST: ICD-10-CM

## 2024-10-08 PROCEDURE — 77067 SCR MAMMO BI INCL CAD: CPT | Mod: 26,,, | Performed by: RADIOLOGY

## 2024-10-08 PROCEDURE — 77067 SCR MAMMO BI INCL CAD: CPT | Mod: TC

## 2024-10-08 PROCEDURE — 77063 BREAST TOMOSYNTHESIS BI: CPT | Mod: TC

## 2024-10-08 PROCEDURE — 77063 BREAST TOMOSYNTHESIS BI: CPT | Mod: 26,,, | Performed by: RADIOLOGY

## 2024-10-15 ENCOUNTER — LAB VISIT (OUTPATIENT)
Dept: LAB | Facility: HOSPITAL | Age: 84
End: 2024-10-15
Attending: INTERNAL MEDICINE
Payer: MEDICARE

## 2024-10-15 DIAGNOSIS — E03.9 HYPOTHYROIDISM: Primary | ICD-10-CM

## 2024-10-15 DIAGNOSIS — E03.9 HYPOTHYROIDISM: ICD-10-CM

## 2024-10-15 LAB
T3 SERPL-MCNC: 81 NG/DL (ref 60–180)
T4 FREE SERPL-MCNC: 1.4 NG/DL (ref 0.71–1.51)
TSH SERPL DL<=0.005 MIU/L-ACNC: 0.14 UIU/ML (ref 0.4–4)

## 2024-10-15 PROCEDURE — 84480 ASSAY TRIIODOTHYRONINE (T3): CPT | Performed by: INTERNAL MEDICINE

## 2024-10-15 PROCEDURE — 36415 COLL VENOUS BLD VENIPUNCTURE: CPT | Performed by: INTERNAL MEDICINE

## 2024-10-15 PROCEDURE — 84443 ASSAY THYROID STIM HORMONE: CPT | Performed by: INTERNAL MEDICINE

## 2024-10-15 PROCEDURE — 84439 ASSAY OF FREE THYROXINE: CPT | Performed by: INTERNAL MEDICINE

## 2024-12-12 ENCOUNTER — HOSPITAL ENCOUNTER (OUTPATIENT)
Dept: RADIOLOGY | Facility: HOSPITAL | Age: 84
Discharge: HOME | End: 2024-12-12
Attending: PAIN MEDICINE
Payer: MEDICARE

## 2024-12-12 ENCOUNTER — HOSPITAL ENCOUNTER (INPATIENT)
Facility: HOSPITAL | Age: 84
LOS: 2 days | Discharge: HOME | DRG: 300 | End: 2024-12-14
Attending: STUDENT IN AN ORGANIZED HEALTH CARE EDUCATION/TRAINING PROGRAM | Admitting: STUDENT IN AN ORGANIZED HEALTH CARE EDUCATION/TRAINING PROGRAM
Payer: MEDICARE

## 2024-12-12 DIAGNOSIS — M79.661 PAIN IN BOTH LOWER LEGS: ICD-10-CM

## 2024-12-12 DIAGNOSIS — R06.02 SOB (SHORTNESS OF BREATH): ICD-10-CM

## 2024-12-12 DIAGNOSIS — I26.99 PULMONARY EMBOLISM: ICD-10-CM

## 2024-12-12 DIAGNOSIS — M79.662 PAIN IN BOTH LOWER LEGS: ICD-10-CM

## 2024-12-12 DIAGNOSIS — R60.9 SWELLING: ICD-10-CM

## 2024-12-12 DIAGNOSIS — I26.99 ACUTE PULMONARY EMBOLISM: ICD-10-CM

## 2024-12-12 DIAGNOSIS — I82.491 ACUTE DEEP VEIN THROMBOSIS (DVT) OF OTHER SPECIFIED VEIN OF RIGHT LOWER EXTREMITY: ICD-10-CM

## 2024-12-12 DIAGNOSIS — I26.99 ACUTE PULMONARY EMBOLISM, UNSPECIFIED PULMONARY EMBOLISM TYPE, UNSPECIFIED WHETHER ACUTE COR PULMONALE PRESENT: Primary | ICD-10-CM

## 2024-12-12 LAB
ALBUMIN SERPL BCP-MCNC: 3.8 G/DL (ref 3.5–5.2)
ALP SERPL-CCNC: 60 U/L (ref 40–150)
ALT SERPL W/O P-5'-P-CCNC: 11 U/L (ref 10–44)
ANION GAP SERPL CALC-SCNC: 10 MMOL/L (ref 8–16)
APTT PPP: 27.9 SEC (ref 21–32)
APTT PPP: >150 SEC (ref 21–32)
AST SERPL-CCNC: 21 U/L (ref 10–40)
BASOPHILS # BLD AUTO: 0.04 K/UL (ref 0–0.2)
BASOPHILS NFR BLD: 0.6 % (ref 0–1.9)
BILIRUB SERPL-MCNC: 0.6 MG/DL (ref 0.1–1)
BNP SERPL-MCNC: 167 PG/ML (ref 0–99)
BUN SERPL-MCNC: 28 MG/DL (ref 8–23)
CALCIUM SERPL-MCNC: 9.2 MG/DL (ref 8.7–10.5)
CHLORIDE SERPL-SCNC: 111 MMOL/L (ref 95–110)
CO2 SERPL-SCNC: 22 MMOL/L (ref 23–29)
CREAT SERPL-MCNC: 0.9 MG/DL (ref 0.5–1.4)
DIFFERENTIAL METHOD BLD: ABNORMAL
EOSINOPHIL # BLD AUTO: 0.2 K/UL (ref 0–0.5)
EOSINOPHIL NFR BLD: 2.4 % (ref 0–8)
ERYTHROCYTE [DISTWIDTH] IN BLOOD BY AUTOMATED COUNT: 13.8 % (ref 11.5–14.5)
EST. GFR  (NO RACE VARIABLE): >60 ML/MIN/1.73 M^2
GLUCOSE SERPL-MCNC: 82 MG/DL (ref 70–110)
HCT VFR BLD AUTO: 34.6 % (ref 37–48.5)
HGB BLD-MCNC: 11.3 G/DL (ref 12–16)
IMM GRANULOCYTES # BLD AUTO: 0.02 K/UL (ref 0–0.04)
IMM GRANULOCYTES NFR BLD AUTO: 0.3 % (ref 0–0.5)
INR PPP: 1 (ref 0.8–1.2)
LYMPHOCYTES # BLD AUTO: 1.2 K/UL (ref 1–4.8)
LYMPHOCYTES NFR BLD: 18.2 % (ref 18–48)
MCH RBC QN AUTO: 31.3 PG (ref 27–31)
MCHC RBC AUTO-ENTMCNC: 32.7 G/DL (ref 32–36)
MCV RBC AUTO: 96 FL (ref 82–98)
MONOCYTES # BLD AUTO: 0.6 K/UL (ref 0.3–1)
MONOCYTES NFR BLD: 8.8 % (ref 4–15)
NEUTROPHILS # BLD AUTO: 4.5 K/UL (ref 1.8–7.7)
NEUTROPHILS NFR BLD: 69.7 % (ref 38–73)
NRBC BLD-RTO: 0 /100 WBC
PLATELET # BLD AUTO: 121 K/UL (ref 150–450)
PMV BLD AUTO: 10.7 FL (ref 9.2–12.9)
POTASSIUM SERPL-SCNC: 4.5 MMOL/L (ref 3.5–5.1)
PROT SERPL-MCNC: 7 G/DL (ref 6–8.4)
PROTHROMBIN TIME: 11.2 SEC (ref 9–12.5)
RBC # BLD AUTO: 3.61 M/UL (ref 4–5.4)
SODIUM SERPL-SCNC: 143 MMOL/L (ref 136–145)
TROPONIN I SERPL DL<=0.01 NG/ML-MCNC: <0.006 NG/ML (ref 0–0.03)
WBC # BLD AUTO: 6.38 K/UL (ref 3.9–12.7)

## 2024-12-12 PROCEDURE — 99900035 HC TECH TIME PER 15 MIN (STAT)

## 2024-12-12 PROCEDURE — 11000001 HC ACUTE MED/SURG PRIVATE ROOM

## 2024-12-12 PROCEDURE — 36415 COLL VENOUS BLD VENIPUNCTURE: CPT | Performed by: STUDENT IN AN ORGANIZED HEALTH CARE EDUCATION/TRAINING PROGRAM

## 2024-12-12 PROCEDURE — 85730 THROMBOPLASTIN TIME PARTIAL: CPT | Performed by: NURSE PRACTITIONER

## 2024-12-12 PROCEDURE — 96366 THER/PROPH/DIAG IV INF ADDON: CPT

## 2024-12-12 PROCEDURE — 99285 EMERGENCY DEPT VISIT HI MDM: CPT | Mod: 25

## 2024-12-12 PROCEDURE — 93970 EXTREMITY STUDY: CPT | Mod: TC

## 2024-12-12 PROCEDURE — 36415 COLL VENOUS BLD VENIPUNCTURE: CPT | Performed by: NURSE PRACTITIONER

## 2024-12-12 PROCEDURE — 93010 ELECTROCARDIOGRAM REPORT: CPT | Mod: ,,, | Performed by: INTERNAL MEDICINE

## 2024-12-12 PROCEDURE — 93970 EXTREMITY STUDY: CPT | Mod: 26,,, | Performed by: RADIOLOGY

## 2024-12-12 PROCEDURE — 85730 THROMBOPLASTIN TIME PARTIAL: CPT | Mod: 91 | Performed by: STUDENT IN AN ORGANIZED HEALTH CARE EDUCATION/TRAINING PROGRAM

## 2024-12-12 PROCEDURE — 94799 UNLISTED PULMONARY SVC/PX: CPT

## 2024-12-12 PROCEDURE — 93005 ELECTROCARDIOGRAM TRACING: CPT

## 2024-12-12 PROCEDURE — 94760 N-INVAS EAR/PLS OXIMETRY 1: CPT

## 2024-12-12 PROCEDURE — 25500020 PHARM REV CODE 255: Performed by: STUDENT IN AN ORGANIZED HEALTH CARE EDUCATION/TRAINING PROGRAM

## 2024-12-12 PROCEDURE — 99900031 HC PATIENT EDUCATION (STAT)

## 2024-12-12 PROCEDURE — 83880 ASSAY OF NATRIURETIC PEPTIDE: CPT | Performed by: NURSE PRACTITIONER

## 2024-12-12 PROCEDURE — 85610 PROTHROMBIN TIME: CPT | Performed by: NURSE PRACTITIONER

## 2024-12-12 PROCEDURE — 63600175 PHARM REV CODE 636 W HCPCS: Performed by: NURSE PRACTITIONER

## 2024-12-12 PROCEDURE — 84484 ASSAY OF TROPONIN QUANT: CPT | Performed by: NURSE PRACTITIONER

## 2024-12-12 PROCEDURE — 85025 COMPLETE CBC W/AUTO DIFF WBC: CPT | Performed by: NURSE PRACTITIONER

## 2024-12-12 PROCEDURE — 80053 COMPREHEN METABOLIC PANEL: CPT | Performed by: NURSE PRACTITIONER

## 2024-12-12 PROCEDURE — 96365 THER/PROPH/DIAG IV INF INIT: CPT

## 2024-12-12 RX ORDER — ASPIRIN 81 MG/1
81 TABLET ORAL DAILY
COMMUNITY
End: 2024-12-18 | Stop reason: SDUPTHER

## 2024-12-12 RX ORDER — TALC
6 POWDER (GRAM) TOPICAL NIGHTLY PRN
Status: DISCONTINUED | OUTPATIENT
Start: 2024-12-12 | End: 2024-12-14 | Stop reason: HOSPADM

## 2024-12-12 RX ORDER — VALSARTAN 80 MG/1
160 TABLET ORAL DAILY
Status: DISCONTINUED | OUTPATIENT
Start: 2024-12-13 | End: 2024-12-14 | Stop reason: HOSPADM

## 2024-12-12 RX ORDER — HEPARIN SODIUM,PORCINE/D5W 25000/250
0-40 INTRAVENOUS SOLUTION INTRAVENOUS CONTINUOUS
Status: DISCONTINUED | OUTPATIENT
Start: 2024-12-12 | End: 2024-12-14

## 2024-12-12 RX ORDER — ONDANSETRON HYDROCHLORIDE 2 MG/ML
4 INJECTION, SOLUTION INTRAVENOUS EVERY 8 HOURS PRN
Status: DISCONTINUED | OUTPATIENT
Start: 2024-12-12 | End: 2024-12-14 | Stop reason: HOSPADM

## 2024-12-12 RX ORDER — MELOXICAM 7.5 MG/1
7.5 TABLET ORAL DAILY PRN
COMMUNITY
Start: 2024-12-03

## 2024-12-12 RX ORDER — SODIUM CHLORIDE 0.9 % (FLUSH) 0.9 %
10 SYRINGE (ML) INJECTION
Status: DISCONTINUED | OUTPATIENT
Start: 2024-12-12 | End: 2024-12-14 | Stop reason: HOSPADM

## 2024-12-12 RX ORDER — VITAMIN B COMPLEX
1 CAPSULE ORAL DAILY
COMMUNITY

## 2024-12-12 RX ORDER — LEVOTHYROXINE SODIUM 175 UG/1
175 TABLET ORAL DAILY
COMMUNITY

## 2024-12-12 RX ORDER — BLACK COHOSH ROOT 200 MG
CAPSULE ORAL
COMMUNITY

## 2024-12-12 RX ORDER — VALSARTAN AND HYDROCHLOROTHIAZIDE 160; 12.5 MG/1; MG/1
1 TABLET, FILM COATED ORAL DAILY
Status: DISCONTINUED | OUTPATIENT
Start: 2024-12-13 | End: 2024-12-12

## 2024-12-12 RX ORDER — HYDROCHLOROTHIAZIDE 12.5 MG/1
12.5 TABLET ORAL DAILY
Status: DISCONTINUED | OUTPATIENT
Start: 2024-12-13 | End: 2024-12-14 | Stop reason: HOSPADM

## 2024-12-12 RX ORDER — ACETAMINOPHEN 325 MG/1
650 TABLET ORAL EVERY 8 HOURS PRN
Status: DISCONTINUED | OUTPATIENT
Start: 2024-12-12 | End: 2024-12-14 | Stop reason: HOSPADM

## 2024-12-12 RX ADMIN — HEPARIN SODIUM 14 UNITS/KG/HR: 10000 INJECTION, SOLUTION INTRAVENOUS at 11:12

## 2024-12-12 RX ADMIN — IOHEXOL 75 ML: 350 INJECTION, SOLUTION INTRAVENOUS at 12:12

## 2024-12-12 RX ADMIN — HEPARIN SODIUM 18 UNITS/KG/HR: 10000 INJECTION, SOLUTION INTRAVENOUS at 01:12

## 2024-12-12 NOTE — PHARMACY MED REC
"  [facility]           Pharmacy  Admission Medication History     The home medication history was taken by Archana Milton.      Medication history obtained from Medications listed below were obtained from: Patient/family    Based on information gathered for medication list, you may go to "Admission" then "Reconcile Home Medications" tabs to review and/or act upon those items.     The home medication list has been updated by the Pharmacy department.   Please read ALL comments highlighted in yellow.   Please address this information as you see fit.    Feel free to contact us if you have any questions or require assistance.    The medications listed below were removed from the home medication list.  Please reorder if appropriate:    Patient reports NOT TAKING the following medication(s):  Temovate ext sol  Ibuprofen 800mg  Nizoral shampoo  Linzess 145mcg  Niacin 50mg  Fish oil 1000mg      No current facility-administered medications on file prior to encounter.     Current Outpatient Medications on File Prior to Encounter   Medication Sig Dispense Refill    ascorbic acid, vitamin C, (VITAMIN C) 500 mg TbSR take once daily      aspirin (ECOTRIN) 81 MG EC tablet Take 81 mg by mouth once daily.      b complex vitamins capsule Take 1 capsule by mouth once daily.      calcium carbonate (CALCIUM 600 ORAL) 1 tablet a day      cholecalciferol, vitamin D3, (VITAMIN D3) 1,000 unit capsule Take 1,000 Units by mouth once daily.      finasteride (PROSCAR) 5 mg tablet Take 5 mg by mouth once daily.      levothyroxine (SYNTHROID, LEVOTHROID) 175 MCG tablet Take 175 mcg by mouth once daily.      MAGNESIUM ORAL Take by mouth once daily.      meloxicam (MOBIC) 7.5 MG tablet Take 7.5 mg by mouth daily as needed for Pain.      rosuvastatin (CRESTOR) 20 MG tablet Take 1 tablet (20 mg total) by mouth once daily. (Patient taking differently: Take 20 mg by mouth every evening.) 90 tablet 3    valsartan-hydrochlorothiazide " (DIOVAN-HCT) 160-12.5 mg per tablet Take 1 tablet by mouth once daily. 90 tablet 3    zinc sulfate (ZINC-220 ORAL) Take by mouth once daily.         Please address this information as you see fit.  Feel free to contact us if you have any questions or require assistance.    Archana Milton  [phone]                .

## 2024-12-12 NOTE — ED PROVIDER NOTES
Encounter Date: 12/12/2024       History     Chief Complaint   Patient presents with    Abnormal Ultrasound     Pt sent to ED per MD for DVT in the right leg seen on ultrasound pta.     Giselle Rios is a 84 y.o. female with PMH of hypothyroidism and obesity sent to the ED for R lower extremity DVT. Patient had an OP DVT U/S of both lower extremities and was + for thrombosis of the right femoral, popliteal and peroneal veins.  Echogenic material seen in the region of thrombosis suggesting possibility of acute on chronic thrombus. She was sent to the ED for further workup and treatment. She notes having a Kyphoplasty procedure by Dr. Richard on 08/02/24 and subsequently developed swelling in her R lower extremity most noticeably over the past 2 weeks. She denies pain but does have skin sensitivity. No numbness/tingling. She denies CP but admits to feeling SOB at times; mainly exertional. Her daughter noticed that she appeared SOB after a shopping trip which is abnormal for her. Denies palpitations or feelings of syncope.     The history is provided by the patient.     Review of patient's allergies indicates:  No Known Allergies  Past Medical History:   Diagnosis Date    Hypothyroidism     Obesity      Past Surgical History:   Procedure Laterality Date    APPENDECTOMY      cervical fusion x 2      HYSTERECTOMY       Family History   Problem Relation Name Age of Onset    Thyroid nodules Father      Diabetes Sister      Nephrolithiasis Daughter      Breast cancer Daughter      Thyroid disease Paternal Uncle      Diabetes Maternal Grandmother       Social History     Tobacco Use    Smoking status: Never    Smokeless tobacco: Never   Substance Use Topics    Alcohol use: No    Drug use: No     Review of Systems   Constitutional:  Negative for activity change, chills and fever.   HENT:  Negative for congestion, ear discharge, ear pain, postnasal drip, sinus pressure, sinus pain and sore throat.    Respiratory:  Positive  for shortness of breath. Negative for cough and chest tightness.    Cardiovascular:  Positive for leg swelling (R lower extremity). Negative for chest pain.   Gastrointestinal:  Negative for abdominal distention, abdominal pain and nausea.   Genitourinary:  Negative for dysuria, frequency and urgency.   Musculoskeletal:  Negative for back pain.   Skin:  Negative for rash.   Neurological:  Negative for dizziness, weakness, light-headedness and numbness.   Hematological:  Does not bruise/bleed easily.       Physical Exam     Initial Vitals   BP Pulse Resp Temp SpO2   12/12/24 1038 12/12/24 1038 12/12/24 1037 12/12/24 1038 12/12/24 1038   135/61 (!) 58 16 97.6 °F (36.4 °C) 100 %      MAP       --                Physical Exam    Nursing note and vitals reviewed.  Constitutional: She appears well-developed and well-nourished.   HENT:   Head: Normocephalic and atraumatic.   Eyes: Conjunctivae and EOM are normal. Pupils are equal, round, and reactive to light.   Neck: Neck supple.   Cardiovascular:  Normal rate, regular rhythm, normal heart sounds and intact distal pulses.           Pulmonary/Chest: Breath sounds normal.   Abdominal: Abdomen is soft. Bowel sounds are normal.   Musculoskeletal:         General: Normal range of motion.      Cervical back: Neck supple.      Right lower leg: Swelling present.      Comments: Good distal pulses      Neurological: She is alert and oriented to person, place, and time. She has normal strength.   Skin: Skin is warm and dry.   Psychiatric: She has a normal mood and affect. Her behavior is normal. Judgment and thought content normal.         ED Course   Procedures  Labs Reviewed   CBC W/ AUTO DIFFERENTIAL - Abnormal       Result Value    WBC 6.38      RBC 3.61 (*)     Hemoglobin 11.3 (*)     Hematocrit 34.6 (*)     MCV 96      MCH 31.3 (*)     MCHC 32.7      RDW 13.8      Platelets 121 (*)     MPV 10.7      Immature Granulocytes 0.3      Gran # (ANC) 4.5      Immature Grans (Abs) 0.02       Lymph # 1.2      Mono # 0.6      Eos # 0.2      Baso # 0.04      nRBC 0      Gran % 69.7      Lymph % 18.2      Mono % 8.8      Eosinophil % 2.4      Basophil % 0.6      Differential Method Automated     COMPREHENSIVE METABOLIC PANEL - Abnormal    Sodium 143      Potassium 4.5      Chloride 111 (*)     CO2 22 (*)     Glucose 82      BUN 28 (*)     Creatinine 0.9      Calcium 9.2      Total Protein 7.0      Albumin 3.8      Total Bilirubin 0.6      Alkaline Phosphatase 60      AST 21      ALT 11      eGFR >60      Anion Gap 10     B-TYPE NATRIURETIC PEPTIDE - Abnormal     (*)    TROPONIN I    Troponin I <0.006     APTT    aPTT 27.9      Narrative:     Draw baseline aPTT prior to starting the heparin bolus or  infusion  (if patient is on warfarin prior to heparin therapy)   PROTIME-INR    Prothrombin Time 11.2      INR 1.0      Narrative:     Draw baseline aPTT prior to starting the heparin bolus or  infusion  (if patient is on warfarin prior to heparin therapy)          Imaging Results              CTA Chest Non-Coronary (PE Studies) (Final result)  Result time 12/12/24 12:42:35      Final result by Donna Virgen MD (12/12/24 12:42:35)                   Impression:      Acute pulmonary emboli involving segmental branches to the right upper and right lower lobe.    Emphysematous architecture of the lungs.  No consolidation or pleural effusions.  No pulmonary nodules.  Mosaic perfusion pattern of the lungs which can be seen the setting of small airways versus small vessels disease.    COMMUNICATION  This critical result was discovered/received at 12:40.  The critical information above was relayed directly by me via secure chat to Maris gong NP on 12/12/2024 at 12:40.      Electronically signed by: Donna Virgen MD  Date:    12/12/2024  Time:    12:42               Narrative:    EXAMINATION:  CTA CHEST NON CORONARY (PE STUDIES)    CLINICAL HISTORY:  Pulmonary embolism (PE) suspected, positive  D-dimer;extensive clot burden R LE; + exertional SOB;    TECHNIQUE:  Low dose axial images, sagittal and coronal reformations were obtained from the thoracic inlet to the lung bases following the IV administration of 75 mL of Omnipaque 350.  Contrast timing was optimized to evaluate the pulmonary arteries.    Technical quality of the exam: Satisfactory.    COMPARISON:  07/19/2022    FINDINGS:  Acute pulmonary emboli are seen involving segmental branches to the right upper lobe and right lower lobe.  No pulmonary embolus seen within the pulmonary trunk or main pulmonary arteries.    The aorta is of normal caliber and tapers appropriately without evidence for aneurysm or dissection.    The main central airways are patent.  The esophagus appears normal.  Small hiatal hernia.  The heart is normal in size.  No pericardial effusion.  No mediastinal, hilar or axillary adenopathy is seen.    Bibasilar subsegmental atelectasis/scarring.  Mild emphysematous change.  Mosaic perfusion pattern of the lungs which can be seen the setting of small airways versus small vessels disease.  No consolidation or pleural effusions.  No pulmonary nodules.    Limited evaluation of the upper abdominal structures show no gross abnormality.    Age-appropriate degenerative changes affect the skeleton.                                       Medications   heparin 25,000 units in dextrose 5% 250 mL (100 units/mL) infusion HIGH INTENSITY nomogram - [facility] (18 Units/kg/hr × 70.1 kg (Adjusted) Intravenous New Bag 12/12/24 1315)   heparin 25,000 units in dextrose 5% (100 units/ml) IV bolus from bag HIGH INTENSITY nomogram - [facility] (has no administration in time range)   heparin 25,000 units in dextrose 5% (100 units/ml) IV bolus from bag HIGH INTENSITY nomogram - [facility] (has no administration in time range)   iohexoL (OMNIPAQUE 350) injection 75 mL (75 mLs Intravenous Given 12/12/24 1226)   heparin 25,000 units in dextrose 5% (100 units/ml) IV bolus from bag  HIGH INTENSITY nomogram - [facility] (5,608 Units Intravenous Bolus from Bag 12/12/24 1315)     Medical Decision Making  Evaluation of an 84-year-old female presenting with + DVT ultrasound of the right lower extremity.  Outpatient DVT ultrasound shows thrombus in the right femoral, peroneal and popliteal veins.   She presents with stable VS.  Oxygen saturation of 100% on room air.  + mild swelling right lower extremity with good distal pulses    Diff dx includes DVT, PE     Problems Addressed:  Acute deep vein thrombosis (DVT) of other specified vein of right lower extremity: acute illness or injury  Acute pulmonary embolism, unspecified pulmonary embolism type, unspecified whether acute cor pulmonale present: acute illness or injury    Amount and/or Complexity of Data Reviewed  Labs: ordered. Decision-making details documented in ED Course.  Radiology: ordered. Decision-making details documented in ED Course.  ECG/medicine tests: ordered and independent interpretation performed.     Details: SB with 1st degree AV block, rate 55. Prolonged MN interval. Normal QRS interval. No STEMI  When compared to EKG of 10/23', 1st degree AV block now present     Risk  Prescription drug management.  Decision regarding hospitalization.  Risk Details: Lab workup with microcytic anemia H/H 11.3/34, Plt 121.   CMP is grossly normal   CTA shows Acute pulmonary emboli involving segmental branches to the right upper and right lower lobe.     Emphysematous architecture of the lungs.  No consolidation or pleural effusions.  No pulmonary nodules.  Mosaic perfusion pattern of the lungs which can be seen the setting of small airways versus small vessels disease.    No evidence of right heart strain> normal troponin    Spoke to Hospital Medicine KATHYA Nance; will admit with IV Heparin GTT.                                           Clinical Impression:  Final diagnoses:  [R06.02] SOB (shortness of breath)  [I26.99] Acute pulmonary embolism,  unspecified pulmonary embolism type, unspecified whether acute cor pulmonale present (Primary)  [I82.491] Acute deep vein thrombosis (DVT) of other specified vein of right lower extremity          ED Disposition Condition    Admit Stable                Maris Hewitt NP  12/12/24 3008

## 2024-12-12 NOTE — NURSING
Patient arrived to room via stretcher with nurse.  Patient is a,a,ox4, VSS, and has no complaints at this time.  Patient has heparin drip infusing with APTT due at 1915.  Patient now resting comfortably in bed locked in lowest position, side rails up x3, and call bell in reach.  Will continue to monitor.

## 2024-12-13 PROBLEM — I26.99 ACUTE PULMONARY EMBOLISM: Status: ACTIVE | Noted: 2024-12-13

## 2024-12-13 PROBLEM — I82.401 ACUTE DEEP VEIN THROMBOSIS (DVT) OF RIGHT LOWER EXTREMITY: Status: ACTIVE | Noted: 2024-12-13

## 2024-12-13 LAB
ANION GAP SERPL CALC-SCNC: 9 MMOL/L (ref 8–16)
APTT PPP: 126.1 SEC (ref 21–32)
APTT PPP: 54.5 SEC (ref 21–32)
APTT PPP: 55.7 SEC (ref 21–32)
BASOPHILS # BLD AUTO: 0.05 K/UL (ref 0–0.2)
BASOPHILS # BLD AUTO: 0.05 K/UL (ref 0–0.2)
BASOPHILS NFR BLD: 1.2 % (ref 0–1.9)
BASOPHILS NFR BLD: 1.2 % (ref 0–1.9)
BUN SERPL-MCNC: 22 MG/DL (ref 8–23)
CALCIUM SERPL-MCNC: 8.7 MG/DL (ref 8.7–10.5)
CHLORIDE SERPL-SCNC: 110 MMOL/L (ref 95–110)
CO2 SERPL-SCNC: 24 MMOL/L (ref 23–29)
CREAT SERPL-MCNC: 0.7 MG/DL (ref 0.5–1.4)
DIFFERENTIAL METHOD BLD: ABNORMAL
DIFFERENTIAL METHOD BLD: ABNORMAL
EOSINOPHIL # BLD AUTO: 0.2 K/UL (ref 0–0.5)
EOSINOPHIL # BLD AUTO: 0.2 K/UL (ref 0–0.5)
EOSINOPHIL NFR BLD: 4.3 % (ref 0–8)
EOSINOPHIL NFR BLD: 4.3 % (ref 0–8)
ERYTHROCYTE [DISTWIDTH] IN BLOOD BY AUTOMATED COUNT: 13.9 % (ref 11.5–14.5)
ERYTHROCYTE [DISTWIDTH] IN BLOOD BY AUTOMATED COUNT: 13.9 % (ref 11.5–14.5)
EST. GFR  (NO RACE VARIABLE): >60 ML/MIN/1.73 M^2
GLUCOSE SERPL-MCNC: 92 MG/DL (ref 70–110)
HCT VFR BLD AUTO: 33 % (ref 37–48.5)
HCT VFR BLD AUTO: 33 % (ref 37–48.5)
HGB BLD-MCNC: 10.6 G/DL (ref 12–16)
HGB BLD-MCNC: 10.6 G/DL (ref 12–16)
IMM GRANULOCYTES # BLD AUTO: 0.01 K/UL (ref 0–0.04)
IMM GRANULOCYTES # BLD AUTO: 0.01 K/UL (ref 0–0.04)
IMM GRANULOCYTES NFR BLD AUTO: 0.2 % (ref 0–0.5)
IMM GRANULOCYTES NFR BLD AUTO: 0.2 % (ref 0–0.5)
LYMPHOCYTES # BLD AUTO: 1.3 K/UL (ref 1–4.8)
LYMPHOCYTES # BLD AUTO: 1.3 K/UL (ref 1–4.8)
LYMPHOCYTES NFR BLD: 31.5 % (ref 18–48)
LYMPHOCYTES NFR BLD: 31.5 % (ref 18–48)
MCH RBC QN AUTO: 31 PG (ref 27–31)
MCH RBC QN AUTO: 31 PG (ref 27–31)
MCHC RBC AUTO-ENTMCNC: 32.1 G/DL (ref 32–36)
MCHC RBC AUTO-ENTMCNC: 32.1 G/DL (ref 32–36)
MCV RBC AUTO: 97 FL (ref 82–98)
MCV RBC AUTO: 97 FL (ref 82–98)
MONOCYTES # BLD AUTO: 0.5 K/UL (ref 0.3–1)
MONOCYTES # BLD AUTO: 0.5 K/UL (ref 0.3–1)
MONOCYTES NFR BLD: 12.6 % (ref 4–15)
MONOCYTES NFR BLD: 12.6 % (ref 4–15)
NEUTROPHILS # BLD AUTO: 2.1 K/UL (ref 1.8–7.7)
NEUTROPHILS # BLD AUTO: 2.1 K/UL (ref 1.8–7.7)
NEUTROPHILS NFR BLD: 50.2 % (ref 38–73)
NEUTROPHILS NFR BLD: 50.2 % (ref 38–73)
NRBC BLD-RTO: 0 /100 WBC
NRBC BLD-RTO: 0 /100 WBC
OHS QRS DURATION: 136 MS
OHS QTC CALCULATION: 411 MS
PLATELET # BLD AUTO: 119 K/UL (ref 150–450)
PLATELET # BLD AUTO: 119 K/UL (ref 150–450)
PMV BLD AUTO: 11 FL (ref 9.2–12.9)
PMV BLD AUTO: 11 FL (ref 9.2–12.9)
POTASSIUM SERPL-SCNC: 3.8 MMOL/L (ref 3.5–5.1)
RBC # BLD AUTO: 3.42 M/UL (ref 4–5.4)
RBC # BLD AUTO: 3.42 M/UL (ref 4–5.4)
SODIUM SERPL-SCNC: 143 MMOL/L (ref 136–145)
WBC # BLD AUTO: 4.22 K/UL (ref 3.9–12.7)
WBC # BLD AUTO: 4.22 K/UL (ref 3.9–12.7)

## 2024-12-13 PROCEDURE — 11000001 HC ACUTE MED/SURG PRIVATE ROOM

## 2024-12-13 PROCEDURE — 85730 THROMBOPLASTIN TIME PARTIAL: CPT | Performed by: STUDENT IN AN ORGANIZED HEALTH CARE EDUCATION/TRAINING PROGRAM

## 2024-12-13 PROCEDURE — 94761 N-INVAS EAR/PLS OXIMETRY MLT: CPT

## 2024-12-13 PROCEDURE — 63600175 PHARM REV CODE 636 W HCPCS: Performed by: NURSE PRACTITIONER

## 2024-12-13 PROCEDURE — 36415 COLL VENOUS BLD VENIPUNCTURE: CPT | Performed by: STUDENT IN AN ORGANIZED HEALTH CARE EDUCATION/TRAINING PROGRAM

## 2024-12-13 PROCEDURE — 99900035 HC TECH TIME PER 15 MIN (STAT)

## 2024-12-13 PROCEDURE — 25000003 PHARM REV CODE 250: Performed by: PHYSICIAN ASSISTANT

## 2024-12-13 PROCEDURE — 80048 BASIC METABOLIC PNL TOTAL CA: CPT | Performed by: NURSE PRACTITIONER

## 2024-12-13 PROCEDURE — 36415 COLL VENOUS BLD VENIPUNCTURE: CPT | Mod: XB | Performed by: FAMILY MEDICINE

## 2024-12-13 PROCEDURE — 99223 1ST HOSP IP/OBS HIGH 75: CPT | Mod: ,,, | Performed by: NURSE PRACTITIONER

## 2024-12-13 PROCEDURE — 85730 THROMBOPLASTIN TIME PARTIAL: CPT | Mod: 91 | Performed by: FAMILY MEDICINE

## 2024-12-13 PROCEDURE — 99222 1ST HOSP IP/OBS MODERATE 55: CPT | Mod: ,,, | Performed by: PHYSICIAN ASSISTANT

## 2024-12-13 PROCEDURE — 85025 COMPLETE CBC W/AUTO DIFF WBC: CPT | Performed by: NURSE PRACTITIONER

## 2024-12-13 PROCEDURE — 25000003 PHARM REV CODE 250: Performed by: NURSE PRACTITIONER

## 2024-12-13 RX ORDER — FINASTERIDE 5 MG/1
5 TABLET, FILM COATED ORAL DAILY
Status: DISCONTINUED | OUTPATIENT
Start: 2024-12-14 | End: 2024-12-14 | Stop reason: HOSPADM

## 2024-12-13 RX ORDER — ATORVASTATIN CALCIUM 80 MG/1
80 TABLET, FILM COATED ORAL DAILY
Status: DISCONTINUED | OUTPATIENT
Start: 2024-12-13 | End: 2024-12-14 | Stop reason: HOSPADM

## 2024-12-13 RX ADMIN — ATORVASTATIN CALCIUM 80 MG: 80 TABLET, FILM COATED ORAL at 04:12

## 2024-12-13 RX ADMIN — HEPARIN SODIUM 10 UNITS/KG/HR: 10000 INJECTION, SOLUTION INTRAVENOUS at 02:12

## 2024-12-13 RX ADMIN — LEVOTHYROXINE SODIUM 137 MCG: 25 TABLET ORAL at 05:12

## 2024-12-13 NOTE — PLAN OF CARE
[place] [facility] (3rd Fl)  Discharge Assessment    Primary Care Provider: Rina Melara MD     Discharge Assessment (most recent)       BRIEF DISCHARGE ASSESSMENT - 12/13/24 1511          Discharge Planning    Assessment Type Discharge Planning Brief Assessment (P)      Resource/Environmental Concerns none (P)      Support Systems Children;Family members;Friends/neighbors (P)      Assistance Needed none (P)      Equipment Currently Used at Home none (P)      Current Living Arrangements home (P)      Patient/Family Anticipates Transition to home (P)      Patient/Family Anticipated Services at Transition none (P)      DME Needed Upon Discharge  none (P)      Discharge Plan A Home (P)      Discharge Plan B Home with family (P)                        Discharge Assessment completed at bedside with patient. Patient lives at home with handicap adult child. Patient is independent with ADL's and uses no DME at home. No post acute needs determined at this time. CM to remain available to assist with discharge.

## 2024-12-13 NOTE — ASSESSMENT & PLAN NOTE
Euvolemic on exam  Normal EF    Recent Labs     12/12/24  1113   *     Latest ECHO  Results for orders placed during the hospital encounter of 03/21/24    Echo    Interpretation Summary    Left Ventricle: There is normal systolic function with a visually estimated ejection fraction of 55 - 60%. Grade I diastolic dysfunction.    Right Ventricle: Normal right ventricular cavity size. Wall thickness is normal. Right ventricle wall motion  is normal. Systolic function is normal.    Aortic Valve: The aortic valve is a trileaflet valve. Mildly restricted motion. There is mild stenosis. Aortic valve area by VTI is 2.01 cm². Aortic valve peak velocity is 1.91 m/s. Mean gradient is 8 mmHg. The dimensionless index is 0.46.    Mitral Valve: There is mild regurgitation with a centrally directed jet.    Tricuspid Valve: There is mild regurgitation.    Pulmonary Artery: No pulmonary hypertension. The estimated pulmonary artery systolic pressure is 18 mmHg.    Current Heart Failure Medications  valsartan tablet 160 mg, Daily, Oral  hydroCHLOROthiazide tablet 12.5 mg, Daily, Oral

## 2024-12-13 NOTE — SUBJECTIVE & OBJECTIVE
Past Medical History:   Diagnosis Date    Hypothyroidism     Obesity        Past Surgical History:   Procedure Laterality Date    APPENDECTOMY      cervical fusion x 2      HYSTERECTOMY         Review of patient's allergies indicates:  No Known Allergies    No current facility-administered medications on file prior to encounter.     Current Outpatient Medications on File Prior to Encounter   Medication Sig    ascorbic acid, vitamin C, (VITAMIN C) 500 mg TbSR take once daily    aspirin (ECOTRIN) 81 MG EC tablet Take 81 mg by mouth once daily.    b complex vitamins capsule Take 1 capsule by mouth once daily.    calcium carbonate (CALCIUM 600 ORAL) 1 tablet a day    cholecalciferol, vitamin D3, (VITAMIN D3) 1,000 unit capsule Take 1,000 Units by mouth once daily.    finasteride (PROSCAR) 5 mg tablet Take 5 mg by mouth once daily.    levothyroxine (SYNTHROID, LEVOTHROID) 175 MCG tablet Take 175 mcg by mouth once daily.    MAGNESIUM ORAL Take by mouth once daily.    meloxicam (MOBIC) 7.5 MG tablet Take 7.5 mg by mouth daily as needed for Pain.    rosuvastatin (CRESTOR) 20 MG tablet Take 1 tablet (20 mg total) by mouth once daily. (Patient taking differently: Take 20 mg by mouth every evening.)    valsartan-hydrochlorothiazide (DIOVAN-HCT) 160-12.5 mg per tablet Take 1 tablet by mouth once daily.    zinc sulfate (ZINC-220 ORAL) Take by mouth once daily.    [DISCONTINUED] folic acid (FOLVITE) 800 MCG Tab Take 800 mcg by mouth once daily.     Family History       Problem Relation (Age of Onset)    Breast cancer Daughter    Diabetes Sister, Maternal Grandmother    Nephrolithiasis Daughter    Thyroid disease Paternal Uncle    Thyroid nodules Father          Tobacco Use    Smoking status: Never    Smokeless tobacco: Never   Substance and Sexual Activity    Alcohol use: No    Drug use: No    Sexual activity: Never     Review of Systems   Constitutional:  Positive for fatigue. Negative for chills and fever.   HENT:  Negative for  ear discharge and ear pain.    Eyes:  Negative for pain and discharge.   Respiratory:  Positive for shortness of breath. Negative for cough.    Cardiovascular:  Positive for leg swelling. Negative for chest pain.   Gastrointestinal:  Negative for nausea and vomiting.   Endocrine: Negative for cold intolerance and heat intolerance.   Genitourinary:  Negative for difficulty urinating and dysuria.   Musculoskeletal:  Positive for arthralgias and back pain. Negative for joint swelling and myalgias.   Skin:  Negative for rash and wound.   Neurological:  Negative for dizziness and headaches.   Psychiatric/Behavioral:  Negative for agitation and confusion.      Objective:     Vital Signs (Most Recent):  Temp: 98 °F (36.7 °C) (12/13/24 1209)  Pulse: (!) 53 (12/13/24 1400)  Resp: 16 (12/13/24 1209)  BP: (!) 140/63 (12/13/24 1209)  SpO2: 97 % (12/13/24 1209) Vital Signs (24h Range):  Temp:  [97.5 °F (36.4 °C)-98.2 °F (36.8 °C)] 98 °F (36.7 °C)  Pulse:  [48-63] 53  Resp:  [16-20] 16  SpO2:  [96 %-100 %] 97 %  BP: (123-140)/(59-63) 140/63     Weight: 79.4 kg (175 lb)  Body mass index is 26.61 kg/m².     Physical Exam  Constitutional:       General: She is not in acute distress.  HENT:      Head: Normocephalic and atraumatic.   Eyes:      General:         Right eye: No discharge.         Left eye: No discharge.   Cardiovascular:      Rate and Rhythm: Normal rate and regular rhythm.   Pulmonary:      Effort: Pulmonary effort is normal.      Breath sounds: Normal breath sounds.   Abdominal:      General: There is no distension.      Tenderness: There is no abdominal tenderness.   Musculoskeletal:         General: No swelling or tenderness.      Cervical back: Neck supple. No tenderness.      Right lower leg: Edema present.   Skin:     General: Skin is warm and dry.   Neurological:      General: No focal deficit present.      Mental Status: She is alert and oriented to person, place, and time.   Psychiatric:         Mood and  "Affect: Mood normal.         Behavior: Behavior normal.                Significant Labs: A1C: No results for input(s): "HGBA1C" in the last 4320 hours.  ABGs: No results for input(s): "PH", "PCO2", "HCO3", "POCSATURATED", "BE", "TOTALHB", "COHB", "METHB", "O2HB", "POCFIO2", "PO2" in the last 48 hours.  Bilirubin:   Recent Labs   Lab 12/12/24  1113   BILITOT 0.6     Blood Culture: No results for input(s): "LABBLOO" in the last 48 hours.  BMP:   Recent Labs   Lab 12/13/24  0456   GLU 92      K 3.8      CO2 24   BUN 22   CREATININE 0.7   CALCIUM 8.7     CBC:   Recent Labs   Lab 12/12/24  1113 12/13/24  0456   WBC 6.38 4.22  4.22   HGB 11.3* 10.6*  10.6*   HCT 34.6* 33.0*  33.0*   * 119*  119*     CMP:   Recent Labs   Lab 12/12/24  1113 12/13/24  0456    143   K 4.5 3.8   * 110   CO2 22* 24   GLU 82 92   BUN 28* 22   CREATININE 0.9 0.7   CALCIUM 9.2 8.7   PROT 7.0  --    ALBUMIN 3.8  --    BILITOT 0.6  --    ALKPHOS 60  --    AST 21  --    ALT 11  --    ANIONGAP 10 9     Cardiac Markers:   Recent Labs   Lab 12/12/24  1113   *     Coagulation:   Recent Labs   Lab 12/12/24  1303 12/12/24  1943 12/13/24  0456   INR 1.0  --   --    APTT 27.9   < > 126.1*    < > = values in this interval not displayed.     Lactic Acid: No results for input(s): "LACTATE" in the last 48 hours.  Lipase: No results for input(s): "LIPASE" in the last 48 hours.  Lipid Panel: No results for input(s): "CHOL", "HDL", "LDLCALC", "TRIG", "CHOLHDL" in the last 48 hours.  Magnesium: No results for input(s): "MG" in the last 48 hours.  POCT Glucose: No results for input(s): "POCTGLUCOSE" in the last 48 hours.  Prealbumin: No results for input(s): "PREALBUMIN" in the last 48 hours.  Respiratory Culture: No results for input(s): "GSRESP", "RESPIRATORYC" in the last 48 hours.  Troponin:   Recent Labs   Lab 12/12/24  1113   TROPONINI <0.006     TSH:   Recent Labs   Lab 10/15/24  1524   TSH 0.143*     Urine Culture: No " "results for input(s): "LABURIN" in the last 48 hours.  Urine Studies: No results for input(s): "COLORU", "APPEARANCEUA", "PHUR", "SPECGRAV", "PROTEINUA", "GLUCUA", "KETONESU", "BILIRUBINUA", "OCCULTUA", "NITRITE", "UROBILINOGEN", "LEUKOCYTESUR", "RBCUA", "WBCUA", "BACTERIA", "SQUAMEPITHEL", "HYALINECASTS" in the last 48 hours.    Invalid input(s): "WRIGHTSUR"    Significant Imaging: I have reviewed all pertinent imaging results/findings within the past 24 hours.  "

## 2024-12-13 NOTE — CONSULTS
[facility] - Med Surg ([facility])  Cardiology  Consult Note    Patient Name: Giselle Rios  MRN: [id number]  Admission Date: 12/12/2024  Hospital Length of Stay: 1 days  Code Status: Full Code   Attending Provider: Danny Arellano MD   Consulting Provider: Skylar Daniels NP  Primary Care Physician: Rina Melara MD  Principal Problem:<principal problem not specified>    Patient information was obtained from patient and ER records.     Inpatient consult to [facility]  Consult performed by: Skylar Daniels NP  Consult ordered by: Lashanda Blas PA-C        Subjective:     Chief Complaint:  DVT/OE     HPI:   84 year old with dyslipidemia, cardiomegaly, HTN, abnormal EKG, aorta atherosclerosis, bilateral carotid disease, combined diastolic and systolic HF, CAD, hypothyroidism, obesity presents for RLE DVT. Swelling noted 8/2024 after kyohopasty. CTA Acute pulmonary emboli involving segmental branches to the right upper and right lower lobe. Lower venous u/s Thrombosis of the right femoral, popliteal and peroneal veins. She admits to very sedentary lifestyle- sitting for hours with disabled daughter. Echogenic material seen in the region of thrombosis suggesting possibility of acute on chronic thrombus. Currently on IV heparin since yesterday- tolerating well.     Past Medical History:   Diagnosis Date    Hypothyroidism     Obesity        Past Surgical History:   Procedure Laterality Date    APPENDECTOMY      cervical fusion x 2      HYSTERECTOMY         Review of patient's allergies indicates:  No Known Allergies    No current facility-administered medications on file prior to encounter.     Current Outpatient Medications on File Prior to Encounter   Medication Sig    ascorbic acid, vitamin C, (VITAMIN C) 500 mg TbSR take once daily    aspirin (ECOTRIN) 81 MG EC tablet Take 81 mg by mouth once daily.    b complex vitamins capsule Take 1 capsule by mouth once daily.    calcium carbonate (CALCIUM  600 ORAL) 1 tablet a day    cholecalciferol, vitamin D3, (VITAMIN D3) 1,000 unit capsule Take 1,000 Units by mouth once daily.    finasteride (PROSCAR) 5 mg tablet Take 5 mg by mouth once daily.    levothyroxine (SYNTHROID, LEVOTHROID) 175 MCG tablet Take 175 mcg by mouth once daily.    MAGNESIUM ORAL Take by mouth once daily.    meloxicam (MOBIC) 7.5 MG tablet Take 7.5 mg by mouth daily as needed for Pain.    rosuvastatin (CRESTOR) 20 MG tablet Take 1 tablet (20 mg total) by mouth once daily. (Patient taking differently: Take 20 mg by mouth every evening.)    valsartan-hydrochlorothiazide (DIOVAN-HCT) 160-12.5 mg per tablet Take 1 tablet by mouth once daily.    zinc sulfate (ZINC-220 ORAL) Take by mouth once daily.    [DISCONTINUED] folic acid (FOLVITE) 800 MCG Tab Take 800 mcg by mouth once daily.     Family History       Problem Relation (Age of Onset)    Breast cancer Daughter    Diabetes Sister, Maternal Grandmother    Nephrolithiasis Daughter    Thyroid disease Paternal Uncle    Thyroid nodules Father          Tobacco Use    Smoking status: Never    Smokeless tobacco: Never   Substance and Sexual Activity    Alcohol use: No    Drug use: No    Sexual activity: Never     Review of Systems   Constitutional: Negative.   Cardiovascular: Negative.    Respiratory: Negative.     Musculoskeletal: Negative.      Objective:     Vital Signs (Most Recent):  Temp: 98 °F (36.7 °C) (12/13/24 1209)  Pulse: (!) 53 (12/13/24 1400)  Resp: 16 (12/13/24 1209)  BP: (!) 140/63 (12/13/24 1209)  SpO2: 97 % (12/13/24 1209) Vital Signs (24h Range):  Temp:  [97.5 °F (36.4 °C)-98.2 °F (36.8 °C)] 98 °F (36.7 °C)  Pulse:  [48-63] 53  Resp:  [16-20] 16  SpO2:  [96 %-100 %] 97 %  BP: (123-176)/(59-76) 140/63     Weight: 79.4 kg (175 lb)  Body mass index is 26.61 kg/m².    SpO2: 97 %         Intake/Output Summary (Last 24 hours) at 12/13/2024 1526  Last data filed at 12/13/2024 0623  Gross per 24 hour   Intake 517.12 ml   Output --   Net 517.12  "ml       Lines/Drains/Airways       Peripheral Intravenous Line  Duration                  Peripheral IV - Single Lumen 12/12/24 1115 20 G Anterior;Right Forearm 1 day                     Physical Exam  Constitutional:       Appearance: She is obese.   Cardiovascular:      Rate and Rhythm: Normal rate and regular rhythm.      Pulses: Normal pulses.      Heart sounds: Normal heart sounds.   Pulmonary:      Effort: Pulmonary effort is normal.      Breath sounds: Normal breath sounds.   Musculoskeletal:         General: Normal range of motion.   Skin:     General: Skin is warm and dry.      Capillary Refill: Capillary refill takes less than 2 seconds.   Neurological:      Mental Status: She is alert and oriented to person, place, and time.   Psychiatric:         Mood and Affect: Mood normal.          Significant Labs: CMP   Recent Labs   Lab 12/12/24  1113 12/13/24  0456    143   K 4.5 3.8   * 110   CO2 22* 24   GLU 82 92   BUN 28* 22   CREATININE 0.9 0.7   CALCIUM 9.2 8.7   PROT 7.0  --    ALBUMIN 3.8  --    BILITOT 0.6  --    ALKPHOS 60  --    AST 21  --    ALT 11  --    ANIONGAP 10 9   , CBC   Recent Labs   Lab 12/12/24  1113 12/13/24  0456   WBC 6.38 4.22  4.22   HGB 11.3* 10.6*  10.6*   HCT 34.6* 33.0*  33.0*   * 119*  119*   , INR   Recent Labs   Lab 12/12/24  1303   INR 1.0   , Lipid Panel No results for input(s): "CHOL", "HDL", "LDLCALC", "TRIG", "CHOLHDL" in the last 48 hours., and Troponin   Recent Labs   Lab 12/12/24  1113   TROPONINI <0.006       Significant Imaging: Echocardiogram: Transthoracic echo (TTE) complete ([facility] Only):   Results for orders placed or performed during the hospital encounter of 03/21/24   Echo   Result Value Ref Range    LVOT stroke volume 86.42 cm3    LVIDd 4.92 3.5 - 6.0 cm    LV Systolic Volume 70.74 mL    LVIDs 4.02 (A) 2.1 - 4.0 cm    LV Diastolic Volume 113.96 mL    IVS 0.85 0.6 - 1.1 cm    LVOT diameter 2.37 cm    LVOT area 4.4 cm2    FS 18 (A) 28 - 44 " %    Left Ventricle Relative Wall Thickness 0.45 cm    PW 1.10 0.6 - 1.1 cm    LV mass 171.28 g    MV Peak E Tevin 0.73 m/s    TDI LATERAL 0.05 m/s    TDI SEPTAL 0.08 m/s    E/E' ratio 11.23 m/s    MV Peak A Tevin 1.21 m/s    TR Max Tevin 1.95 m/s    E/A ratio 0.60     IVRT 190.29 msec    E wave deceleration time 309.77 msec    LV SEPTAL E/E' RATIO 9.13 m/s    LV LATERAL E/E' RATIO 14.60 m/s    PV Peak S Tevin 0.63 m/s    PV Peak D Tevin 0.33 m/s    Pulm vein S/D ratio 1.91     LVOT peak tevin 0.82 m/s    Left Ventricular Outflow Tract Mean Velocity 0.66 cm/s    Left Ventricular Outflow Tract Mean Gradient 1.90 mmHg    RVDD 3.03 cm    RVOT peak VTI 15.9 cm    LA size 4.11 cm    Left Atrium Minor Axis 4.28 cm    Left Atrium Major Axis 4.49 cm    LA Vol (MOD) 40.44 cm3    RA Major Axis 5.97 cm    AV mean gradient 8 mmHg    AV peak gradient 15 mmHg    Ao peak tevin 1.91 m/s    Ao VTI 43.00 cm    LVOT peak VTI 19.60 cm    AV valve area 2.01 cm²    AV Velocity Ratio 0.43     AV index (prosthetic) 0.46     TESFAYE by Velocity Ratio 1.89 cm²    MV stenosis pressure 1/2 time 89.83 ms    MV valve area p 1/2 method 2.45 cm2    Triscuspid Valve Regurgitation Peak Gradient 15 mmHg    PV mean gradient 1 mmHg    PV PEAK VELOCITY 1.01 m/s    PV peak gradient 4 mmHg    Pulmonary Valve Mean Velocity 0.73 m/s    RVOT peak tevin 0.74 m/s    Sinus 2.65 cm    STJ 2.60 cm    Ascending aorta 2.71 cm    Mean e' 0.07 m/s    LA Vol 53.59 cm3    LA WIDTH 3.5 cm    TV resting pulmonary artery pressure 18 mmHg    RV TB RVSP 5 mmHg    Est. RA pres 3 mmHg    Narrative      Left Ventricle: There is normal systolic function with a visually   estimated ejection fraction of 55 - 60%. Grade I diastolic dysfunction.    Right Ventricle: Normal right ventricular cavity size. Wall thickness   is normal. Right ventricle wall motion  is normal. Systolic function is   normal.    Aortic Valve: The aortic valve is a trileaflet valve. Mildly restricted   motion. There is mild  stenosis. Aortic valve area by VTI is 2.01 cm².   Aortic valve peak velocity is 1.91 m/s. Mean gradient is 8 mmHg. The   dimensionless index is 0.46.    Mitral Valve: There is mild regurgitation with a centrally directed   jet.    Tricuspid Valve: There is mild regurgitation.    Pulmonary Artery: No pulmonary hypertension. The estimated pulmonary   artery systolic pressure is 18 mmHg.       Assessment and Plan:     PE  CTA Acute pulmonary emboli involving segmental branches to the right upper and right lower lobe. Lower venous u/s Thrombosis of the right femoral, popliteal and peroneal veins. No pulmonary embolus seen within the pulmonary trunk or main pulmonary arteries. She admits to very sedentary lifestyle- sitting for hours with disabled daughter. Echogenic material seen in the region of thrombosis suggesting possibility of acute on chronic thrombus. Currently on IV heparin since yesterday- tolerating well, improving. Neg trop. No evidence RH strain EKG. Echo pending  Case discussed with primary team  Plans for transition to [facility] tomorrow with discharge   Consider hepatologist consult of evaluation hypercoagulably state  Follow up outpatient.     Coronary artery disease involving native coronary artery of native heart without angina pectoris  No stress-induced ischemia on perfusion study June 2022.     Aortic valve disease  Mild; stable    Echo    Result Date: 3/21/2024    Left Ventricle: There is normal systolic function with a visually   estimated ejection fraction of 55 - 60%. Grade I diastolic dysfunction.    Right Ventricle: Normal right ventricular cavity size. Wall thickness   is normal. Right ventricle wall motion  is normal. Systolic function is   normal.    Aortic Valve: The aortic valve is a trileaflet valve. Mildly restricted   motion. There is mild stenosis. Aortic valve area by VTI is 2.01 cm².   Aortic valve peak velocity is 1.91 m/s. Mean gradient is 8 mmHg. The   dimensionless index is  0.46.    Mitral Valve: There is mild regurgitation with a centrally directed   jet.    Tricuspid Valve: There is mild regurgitation.    Pulmonary Artery: No pulmonary hypertension. The estimated pulmonary   artery systolic pressure is 18 mmHg.          Chronic combined systolic and diastolic congestive heart failure  Euvolemic on exam  Normal EF    Recent Labs     12/12/24  1113   *     Latest ECHO  Results for orders placed during the hospital encounter of 03/21/24    Echo    Interpretation Summary    Left Ventricle: There is normal systolic function with a visually estimated ejection fraction of 55 - 60%. Grade I diastolic dysfunction.    Right Ventricle: Normal right ventricular cavity size. Wall thickness is normal. Right ventricle wall motion  is normal. Systolic function is normal.    Aortic Valve: The aortic valve is a trileaflet valve. Mildly restricted motion. There is mild stenosis. Aortic valve area by VTI is 2.01 cm². Aortic valve peak velocity is 1.91 m/s. Mean gradient is 8 mmHg. The dimensionless index is 0.46.    Mitral Valve: There is mild regurgitation with a centrally directed jet.    Tricuspid Valve: There is mild regurgitation.    Pulmonary Artery: No pulmonary hypertension. The estimated pulmonary artery systolic pressure is 18 mmHg.    Current Heart Failure Medications  valsartan tablet 160 mg, Daily, Oral  hydroCHLOROthiazide tablet 12.5 mg, Daily, Oral      Bilateral carotid artery stenosis  Mild bilateral carotid disease May 2022.     Aortic atherosclerosis  Stable continue; statin     Hypertension  Continue current regimen    Current Antihypertensives  valsartan tablet 160 mg, Daily, Oral  hydroCHLOROthiazide tablet 12.5 mg, Daily, Oral      Cardiomyopathy  2024    Left Ventricle: There is normal systolic function with a visually estimated ejection fraction of 55 - 60%. Grade I diastolic dysfunction.    Right Ventricle: Normal right ventricular cavity size. Wall thickness is normal.  Right ventricle wall motion  is normal. Systolic function is normal.    Aortic Valve: The aortic valve is a trileaflet valve. Mildly restricted motion. There is mild stenosis. Aortic valve area by VTI is 2.01 cm². Aortic valve peak velocity is 1.91 m/s. Mean gradient is 8 mmHg. The dimensionless index is 0.46.    Mitral Valve: There is mild regurgitation with a centrally directed jet.    Tricuspid Valve: There is mild regurgitation.    Pulmonary Artery: No pulmonary hypertension. The estimated pulmonary artery systolic pressure is 18 mmHg.    2022  Moderate aortic valve stenosis area 1.3 mean gradient 9.4 moderate MR possible ASD left ventricular ejection fraction 40%. Mild cardiomyopathy of unclear etiology. No stress-induced ischemia on perfusion study June 2022.     Dyslipidemia  Resume statin         VTE Risk Mitigation (From admission, onward)           Ordered     IP VTE HIGH RISK PATIENT  Once         12/12/24 1547     Reason for no Mechanical VTE Prophylaxis  Once        Question:  Reasons:  Answer:  Patient is Ambulatory  Comment:  IV Heparin    12/12/24 1547     heparin 25,000 units in dextrose 5% (100 units/ml) IV bolus from bag HIGH INTENSITY nomogram - [facility]  As needed (PRN)        Question:  Heparin Infusion Adjustment (DO NOT MODIFY ANSWER)  Answer:  [email or web address]\epic\Images\Pharmacy\HeparinInfusions\heparin HIGH INTENSITY nomogram for [facility] [id number]    12/12/24 1256     heparin 25,000 units in dextrose 5% (100 units/ml) IV bolus from bag HIGH INTENSITY nomogram - [facility]  As needed (PRN)        Question:  Heparin Infusion Adjustment (DO NOT MODIFY ANSWER)  Answer:  \\[email or web address]\epic\Images\Pharmacy\HeparinInfusions\heparin HIGH INTENSITY nomogram for [facility] [id number]    12/12/24 1256     heparin 25,000 units in dextrose 5% 250 mL (100 units/mL) infusion HIGH INTENSITY nomogram - [facility]  Continuous        Question:  Begin at (units/kg/hr)  Answer:  18    12/12/24 1256                    Thank you for your  consult. I will follow-up with patient. Please contact us if you have any additional questions.    Skylar Daniels NP  Cardiology   [place] [facility] - Med Surg (3rd Fl)

## 2024-12-13 NOTE — NURSING
Heparin gtt restarted per nomogram. Hparin restarted at 10u/kg/hr, next PTT to be drawn at 1422.

## 2024-12-13 NOTE — HPI
Patient is a 84 year old female with medical history of HTN, HLD, CAD, hypothyroidism and obesity who presented to the ED with right lower extremity edema and SOB.  Patient had kyphoplasty in August and hasn't been as active since then but denies long periods of immobility.   She is up to date on all her annual check up's and screenings.  Over the past several weeks she has had pain and edema of right lower extremity.  She then developed right back pain and SOB.       Admitted for DVT and PE.

## 2024-12-13 NOTE — ASSESSMENT & PLAN NOTE
Continue current regimen    Current Antihypertensives  valsartan tablet 160 mg, Daily, Oral  hydroCHLOROthiazide tablet 12.5 mg, Daily, Oral

## 2024-12-13 NOTE — ASSESSMENT & PLAN NOTE
2024    Left Ventricle: There is normal systolic function with a visually estimated ejection fraction of 55 - 60%. Grade I diastolic dysfunction.    Right Ventricle: Normal right ventricular cavity size. Wall thickness is normal. Right ventricle wall motion  is normal. Systolic function is normal.    Aortic Valve: The aortic valve is a trileaflet valve. Mildly restricted motion. There is mild stenosis. Aortic valve area by VTI is 2.01 cm². Aortic valve peak velocity is 1.91 m/s. Mean gradient is 8 mmHg. The dimensionless index is 0.46.    Mitral Valve: There is mild regurgitation with a centrally directed jet.    Tricuspid Valve: There is mild regurgitation.    Pulmonary Artery: No pulmonary hypertension. The estimated pulmonary artery systolic pressure is 18 mmHg.    2022  Moderate aortic valve stenosis area 1.3 mean gradient 9.4 moderate MR possible ASD left ventricular ejection fraction 40%. Mild cardiomyopathy of unclear etiology. No stress-induced ischemia on perfusion study June 2022.    Lab Facility: [id number] Lab Facility: [id number]

## 2024-12-13 NOTE — HPI
84 year old with dyslipidemia, cardiomegaly, HTN, abnormal EKG, aorta atherosclerosis, bilateral carotid disease, combined diastolic and systolic HF, CAD, hypothyroidism, obesity presents for RLE DVT. Swelling noted 8/2024 after kyohopasty. CTA Acute pulmonary emboli involving segmental branches to the right upper and right lower lobe. Lower venous u/s Thrombosis of the right femoral, popliteal and peroneal veins. She admits to very sedentary lifestyle- sitting for hours with disabled daughter. Echogenic material seen in the region of thrombosis suggesting possibility of acute on chronic thrombus. Currently on IV heparin since yesterday- tolerating well.

## 2024-12-13 NOTE — ASSESSMENT & PLAN NOTE
Mild; stable    Echo    Result Date: 3/21/2024    Left Ventricle: There is normal systolic function with a visually   estimated ejection fraction of 55 - 60%. Grade I diastolic dysfunction.    Right Ventricle: Normal right ventricular cavity size. Wall thickness   is normal. Right ventricle wall motion  is normal. Systolic function is   normal.    Aortic Valve: The aortic valve is a trileaflet valve. Mildly restricted   motion. There is mild stenosis. Aortic valve area by VTI is 2.01 cm².   Aortic valve peak velocity is 1.91 m/s. Mean gradient is 8 mmHg. The   dimensionless index is 0.46.    Mitral Valve: There is mild regurgitation with a centrally directed   jet.    Tricuspid Valve: There is mild regurgitation.    Pulmonary Artery: No pulmonary hypertension. The estimated pulmonary   artery systolic pressure is 18 mmHg.

## 2024-12-13 NOTE — SUBJECTIVE & OBJECTIVE
Past Medical History:   Diagnosis Date    Hypothyroidism     Obesity        Past Surgical History:   Procedure Laterality Date    APPENDECTOMY      cervical fusion x 2      HYSTERECTOMY         Review of patient's allergies indicates:  No Known Allergies    No current facility-administered medications on file prior to encounter.     Current Outpatient Medications on File Prior to Encounter   Medication Sig    ascorbic acid, vitamin C, (VITAMIN C) 500 mg TbSR take once daily    aspirin (ECOTRIN) 81 MG EC tablet Take 81 mg by mouth once daily.    b complex vitamins capsule Take 1 capsule by mouth once daily.    calcium carbonate (CALCIUM 600 ORAL) 1 tablet a day    cholecalciferol, vitamin D3, (VITAMIN D3) 1,000 unit capsule Take 1,000 Units by mouth once daily.    finasteride (PROSCAR) 5 mg tablet Take 5 mg by mouth once daily.    levothyroxine (SYNTHROID, LEVOTHROID) 175 MCG tablet Take 175 mcg by mouth once daily.    MAGNESIUM ORAL Take by mouth once daily.    meloxicam (MOBIC) 7.5 MG tablet Take 7.5 mg by mouth daily as needed for Pain.    rosuvastatin (CRESTOR) 20 MG tablet Take 1 tablet (20 mg total) by mouth once daily. (Patient taking differently: Take 20 mg by mouth every evening.)    valsartan-hydrochlorothiazide (DIOVAN-HCT) 160-12.5 mg per tablet Take 1 tablet by mouth once daily.    zinc sulfate (ZINC-220 ORAL) Take by mouth once daily.    [DISCONTINUED] folic acid (FOLVITE) 800 MCG Tab Take 800 mcg by mouth once daily.     Family History       Problem Relation (Age of Onset)    Breast cancer Daughter    Diabetes Sister, Maternal Grandmother    Nephrolithiasis Daughter    Thyroid disease Paternal Uncle    Thyroid nodules Father          Tobacco Use    Smoking status: Never    Smokeless tobacco: Never   Substance and Sexual Activity    Alcohol use: No    Drug use: No    Sexual activity: Never     Review of Systems   Constitutional: Negative.   Cardiovascular: Negative.    Respiratory: Negative.      Musculoskeletal: Negative.      Objective:     Vital Signs (Most Recent):  Temp: 98 °F (36.7 °C) (12/13/24 1209)  Pulse: (!) 53 (12/13/24 1400)  Resp: 16 (12/13/24 1209)  BP: (!) 140/63 (12/13/24 1209)  SpO2: 97 % (12/13/24 1209) Vital Signs (24h Range):  Temp:  [97.5 °F (36.4 °C)-98.2 °F (36.8 °C)] 98 °F (36.7 °C)  Pulse:  [48-63] 53  Resp:  [16-20] 16  SpO2:  [96 %-100 %] 97 %  BP: (123-176)/(59-76) 140/63     Weight: 79.4 kg (175 lb)  Body mass index is 26.61 kg/m².    SpO2: 97 %         Intake/Output Summary (Last 24 hours) at 12/13/2024 1526  Last data filed at 12/13/2024 0623  Gross per 24 hour   Intake 517.12 ml   Output --   Net 517.12 ml       Lines/Drains/Airways       Peripheral Intravenous Line  Duration                  Peripheral IV - Single Lumen 12/12/24 1115 20 G Anterior;Right Forearm 1 day                     Physical Exam  Constitutional:       Appearance: She is obese.   Cardiovascular:      Rate and Rhythm: Normal rate and regular rhythm.      Pulses: Normal pulses.      Heart sounds: Normal heart sounds.   Pulmonary:      Effort: Pulmonary effort is normal.      Breath sounds: Normal breath sounds.   Musculoskeletal:         General: Normal range of motion.   Skin:     General: Skin is warm and dry.      Capillary Refill: Capillary refill takes less than 2 seconds.   Neurological:      Mental Status: She is alert and oriented to person, place, and time.   Psychiatric:         Mood and Affect: Mood normal.          Significant Labs: CMP   Recent Labs   Lab 12/12/24  1113 12/13/24  0456    143   K 4.5 3.8   * 110   CO2 22* 24   GLU 82 92   BUN 28* 22   CREATININE 0.9 0.7   CALCIUM 9.2 8.7   PROT 7.0  --    ALBUMIN 3.8  --    BILITOT 0.6  --    ALKPHOS 60  --    AST 21  --    ALT 11  --    ANIONGAP 10 9   , CBC   Recent Labs   Lab 12/12/24  1113 12/13/24  0456   WBC 6.38 4.22  4.22   HGB 11.3* 10.6*  10.6*   HCT 34.6* 33.0*  33.0*   * 119*  119*   , INR   Recent Labs  "  Lab 12/12/24  1303   INR 1.0   , Lipid Panel No results for input(s): "CHOL", "HDL", "LDLCALC", "TRIG", "CHOLHDL" in the last 48 hours., and Troponin   Recent Labs   Lab 12/12/24  1113   TROPONINI <0.006       Significant Imaging: Echocardiogram: Transthoracic echo (TTE) complete ([facility] Only):   Results for orders placed or performed during the hospital encounter of 03/21/24   Echo   Result Value Ref Range    LVOT stroke volume 86.42 cm3    LVIDd 4.92 3.5 - 6.0 cm    LV Systolic Volume 70.74 mL    LVIDs 4.02 (A) 2.1 - 4.0 cm    LV Diastolic Volume 113.96 mL    IVS 0.85 0.6 - 1.1 cm    LVOT diameter 2.37 cm    LVOT area 4.4 cm2    FS 18 (A) 28 - 44 %    Left Ventricle Relative Wall Thickness 0.45 cm    PW 1.10 0.6 - 1.1 cm    LV mass 171.28 g    MV Peak E Tevin 0.73 m/s    TDI LATERAL 0.05 m/s    TDI SEPTAL 0.08 m/s    E/E' ratio 11.23 m/s    MV Peak A Tevin 1.21 m/s    TR Max Tevin 1.95 m/s    E/A ratio 0.60     IVRT 190.29 msec    E wave deceleration time 309.77 msec    LV SEPTAL E/E' RATIO 9.13 m/s    LV LATERAL E/E' RATIO 14.60 m/s    PV Peak S Tevin 0.63 m/s    PV Peak D Tevin 0.33 m/s    Pulm vein S/D ratio 1.91     LVOT peak tevin 0.82 m/s    Left Ventricular Outflow Tract Mean Velocity 0.66 cm/s    Left Ventricular Outflow Tract Mean Gradient 1.90 mmHg    RVDD 3.03 cm    RVOT peak VTI 15.9 cm    LA size 4.11 cm    Left Atrium Minor Axis 4.28 cm    Left Atrium Major Axis 4.49 cm    LA Vol (MOD) 40.44 cm3    RA Major Axis 5.97 cm    AV mean gradient 8 mmHg    AV peak gradient 15 mmHg    Ao peak tevin 1.91 m/s    Ao VTI 43.00 cm    LVOT peak VTI 19.60 cm    AV valve area 2.01 cm²    AV Velocity Ratio 0.43     AV index (prosthetic) 0.46     TESFAYE by Velocity Ratio 1.89 cm²    MV stenosis pressure 1/2 time 89.83 ms    MV valve area p 1/2 method 2.45 cm2    Triscuspid Valve Regurgitation Peak Gradient 15 mmHg    PV mean gradient 1 mmHg    PV PEAK VELOCITY 1.01 m/s    PV peak gradient 4 mmHg    Pulmonary Valve Mean Velocity 0.73 " m/s    RVOT peak nelia 0.74 m/s    Sinus 2.65 cm    STJ 2.60 cm    Ascending aorta 2.71 cm    Mean e' 0.07 m/s    LA Vol 53.59 cm3    LA WIDTH 3.5 cm    TV resting pulmonary artery pressure 18 mmHg    RV TB RVSP 5 mmHg    Est. RA pres 3 mmHg    Narrative      Left Ventricle: There is normal systolic function with a visually   estimated ejection fraction of 55 - 60%. Grade I diastolic dysfunction.    Right Ventricle: Normal right ventricular cavity size. Wall thickness   is normal. Right ventricle wall motion  is normal. Systolic function is   normal.    Aortic Valve: The aortic valve is a trileaflet valve. Mildly restricted   motion. There is mild stenosis. Aortic valve area by VTI is 2.01 cm².   Aortic valve peak velocity is 1.91 m/s. Mean gradient is 8 mmHg. The   dimensionless index is 0.46.    Mitral Valve: There is mild regurgitation with a centrally directed   jet.    Tricuspid Valve: There is mild regurgitation.    Pulmonary Artery: No pulmonary hypertension. The estimated pulmonary   artery systolic pressure is 18 mmHg.

## 2024-12-14 VITALS
WEIGHT: 175 LBS | HEART RATE: 62 BPM | RESPIRATION RATE: 20 BRPM | SYSTOLIC BLOOD PRESSURE: 128 MMHG | TEMPERATURE: 98 F | HEIGHT: 68 IN | OXYGEN SATURATION: 98 % | BODY MASS INDEX: 26.52 KG/M2 | DIASTOLIC BLOOD PRESSURE: 58 MMHG

## 2024-12-14 DIAGNOSIS — I82.491 ACUTE DEEP VEIN THROMBOSIS (DVT) OF OTHER SPECIFIED VEIN OF RIGHT LOWER EXTREMITY: ICD-10-CM

## 2024-12-14 DIAGNOSIS — I26.99 ACUTE PULMONARY EMBOLISM WITHOUT ACUTE COR PULMONALE, UNSPECIFIED PULMONARY EMBOLISM TYPE: Primary | ICD-10-CM

## 2024-12-14 PROBLEM — D69.6 THROMBOCYTOPENIA: Status: ACTIVE | Noted: 2024-12-14

## 2024-12-14 PROBLEM — D64.9 ANEMIA: Status: ACTIVE | Noted: 2024-12-14

## 2024-12-14 LAB
APTT PPP: 53.3 SEC (ref 21–32)
APTT PPP: 53.3 SEC (ref 21–32)
APTT PPP: 56 SEC (ref 21–32)
BASOPHILS # BLD AUTO: 0.05 K/UL (ref 0–0.2)
BASOPHILS NFR BLD: 1 % (ref 0–1.9)
DIFFERENTIAL METHOD BLD: ABNORMAL
EOSINOPHIL # BLD AUTO: 0.1 K/UL (ref 0–0.5)
EOSINOPHIL NFR BLD: 2.8 % (ref 0–8)
ERYTHROCYTE [DISTWIDTH] IN BLOOD BY AUTOMATED COUNT: 13.8 % (ref 11.5–14.5)
HCT VFR BLD AUTO: 35.6 % (ref 37–48.5)
HGB BLD-MCNC: 11.3 G/DL (ref 12–16)
IMM GRANULOCYTES # BLD AUTO: 0.01 K/UL (ref 0–0.04)
IMM GRANULOCYTES NFR BLD AUTO: 0.2 % (ref 0–0.5)
LYMPHOCYTES # BLD AUTO: 1.1 K/UL (ref 1–4.8)
LYMPHOCYTES NFR BLD: 23 % (ref 18–48)
MCH RBC QN AUTO: 30.3 PG (ref 27–31)
MCHC RBC AUTO-ENTMCNC: 31.7 G/DL (ref 32–36)
MCV RBC AUTO: 95 FL (ref 82–98)
MONOCYTES # BLD AUTO: 0.5 K/UL (ref 0.3–1)
MONOCYTES NFR BLD: 9.3 % (ref 4–15)
NEUTROPHILS # BLD AUTO: 3.1 K/UL (ref 1.8–7.7)
NEUTROPHILS NFR BLD: 63.7 % (ref 38–73)
NRBC BLD-RTO: 0 /100 WBC
PLATELET # BLD AUTO: 129 K/UL (ref 150–450)
PMV BLD AUTO: 10.4 FL (ref 9.2–12.9)
RBC # BLD AUTO: 3.73 M/UL (ref 4–5.4)
WBC # BLD AUTO: 4.92 K/UL (ref 3.9–12.7)

## 2024-12-14 PROCEDURE — 25000003 PHARM REV CODE 250: Performed by: PHYSICIAN ASSISTANT

## 2024-12-14 PROCEDURE — 25000003 PHARM REV CODE 250: Performed by: NURSE PRACTITIONER

## 2024-12-14 PROCEDURE — 36415 COLL VENOUS BLD VENIPUNCTURE: CPT | Performed by: FAMILY MEDICINE

## 2024-12-14 PROCEDURE — 25000003 PHARM REV CODE 250: Performed by: STUDENT IN AN ORGANIZED HEALTH CARE EDUCATION/TRAINING PROGRAM

## 2024-12-14 PROCEDURE — 85730 THROMBOPLASTIN TIME PARTIAL: CPT | Performed by: FAMILY MEDICINE

## 2024-12-14 PROCEDURE — 85025 COMPLETE CBC W/AUTO DIFF WBC: CPT | Performed by: NURSE PRACTITIONER

## 2024-12-14 PROCEDURE — 94760 N-INVAS EAR/PLS OXIMETRY 1: CPT

## 2024-12-14 PROCEDURE — 25000003 PHARM REV CODE 250: Performed by: FAMILY MEDICINE

## 2024-12-14 PROCEDURE — 99239 HOSP IP/OBS DSCHRG MGMT >30: CPT | Mod: ,,, | Performed by: FAMILY MEDICINE

## 2024-12-14 RX ADMIN — ATORVASTATIN CALCIUM 80 MG: 80 TABLET, FILM COATED ORAL at 08:12

## 2024-12-14 RX ADMIN — VALSARTAN 160 MG: 80 TABLET, FILM COATED ORAL at 09:12

## 2024-12-14 RX ADMIN — FINASTERIDE 5 MG: 5 TABLET, FILM COATED ORAL at 09:12

## 2024-12-14 RX ADMIN — APIXABAN 5 MG: 5 TABLET, FILM COATED ORAL at 09:12

## 2024-12-14 RX ADMIN — LEVOTHYROXINE SODIUM 137 MCG: 25 TABLET ORAL at 05:12

## 2024-12-14 NOTE — PROGRESS NOTES
[facility] ([address] [place] [address] Medicine  Progress Note    Patient Name: Giselle Rios  MRN: [id number]  Patient Class: IP- Inpatient   Admission Date: 12/12/2024  Length of Stay: 2 days  Attending Physician: Danny Arellano MD  Primary Care Provider: Rina Melara MD        Subjective     Principal Problem:Acute pulmonary embolism        HPI:  Patient is a 84 year old female with medical history of HTN, HLD, CAD, hypothyroidism and obesity who presented to the ED with right lower extremity edema and SOB.  Patient had kyphoplasty in August and hasn't been as active since then but denies long periods of immobility.   She is up to date on all her annual check up's and screenings.  Over the past several weeks she has had pain and edema of right lower extremity.  She then developed right back pain and SOB.       Admitted for DVT and PE.          Overview/Hospital Course:  Patient feeling great.  Denies dyspnea with exertion.  She wants to go home and go to grandson's birthday party.      Interval History: doing well.  Ready for discharge    Review of Systems   Constitutional:  Negative for activity change, chills, fatigue, fever and unexpected weight change.   HENT:  Negative for sore throat and trouble swallowing.    Respiratory:  Negative for cough, chest tightness and shortness of breath.    Cardiovascular:  Positive for leg swelling. Negative for chest pain.   Gastrointestinal:  Negative for abdominal pain.   Endocrine: Negative for cold intolerance and heat intolerance.   Genitourinary:  Negative for difficulty urinating.   Musculoskeletal:  Negative for back pain and joint swelling.   Skin:  Negative for rash.   Neurological:  Negative for numbness.   Hematological:  Negative for adenopathy.   Psychiatric/Behavioral:  Negative for decreased concentration.      Objective:     Vital Signs (Most Recent):  Temp: 98.2 °F (36.8 °C) (12/14/24 0732)  Pulse: 60 (12/14/24 0803)  Resp: 18 (12/14/24  0732)  BP: 133/60 (12/14/24 0732)  SpO2: (!) 94 % (12/14/24 0745) Vital Signs (24h Range):  Temp:  [97.8 °F (36.6 °C)-98.3 °F (36.8 °C)] 98.2 °F (36.8 °C)  Pulse:  [51-63] 60  Resp:  [16-20] 18  SpO2:  [94 %-98 %] 94 %  BP: (118-140)/(55-67) 133/60     Weight: 79.4 kg (175 lb)  Body mass index is 26.61 kg/m².    Intake/Output Summary (Last 24 hours) at 12/14/2024 1012  Last data filed at 12/14/2024 0838  Gross per 24 hour   Intake 687.37 ml   Output --   Net 687.37 ml         Physical Exam  Constitutional:       Appearance: Normal appearance. She is well-developed and normal weight.   Neck:      Comments: No JVD  Cardiovascular:      Rate and Rhythm: Normal rate and regular rhythm.      Pulses: Normal pulses.      Heart sounds: Normal heart sounds. No murmur heard.     No friction rub. No gallop.   Pulmonary:      Effort: Pulmonary effort is normal.      Breath sounds: Normal breath sounds.   Musculoskeletal:         General: No tenderness.      Cervical back: Normal range of motion and neck supple.      Right lower leg: No edema.      Left lower leg: No edema.   Neurological:      General: No focal deficit present.      Mental Status: She is alert and oriented to person, place, and time. Mental status is at baseline.   Psychiatric:         Mood and Affect: Mood normal.             Significant Labs: All pertinent labs within the past 24 hours have been reviewed.  BMP:   Recent Labs   Lab 12/13/24  0456   GLU 92      K 3.8      CO2 24   BUN 22   CREATININE 0.7   CALCIUM 8.7     CBC:   Recent Labs   Lab 12/12/24  1113 12/13/24  0456 12/14/24  0618   WBC 6.38 4.22  4.22 4.92   HGB 11.3* 10.6*  10.6* 11.3*   HCT 34.6* 33.0*  33.0* 35.6*   * 119*  119* 129*     TSH:   Recent Labs   Lab 10/15/24  1524   TSH 0.143*       Significant Imaging: I have reviewed all pertinent imaging results/findings within the past 24 hours.  I have reviewed and interpreted all pertinent imaging results/findings within  the past 24 hours.    Assessment and Plan     * Acute pulmonary embolism  Seen on CTA chest  IV heparin gtt.  Start Eloquis this morning.  Stop heparin at 11 am then discharge pt  Likely due to decreased mobility but will defer further workup to cardiology  Plan to discharge with po Eliquis tomorrow   Cardiology consulted   Patient chest pain and SOB resolved  F/U with me this Wednesday      Thrombocytopenia  The likely etiology of thrombocytopenia is  multifactorial . The patients 3 most recent labs are listed below.  Recent Labs     12/12/24  1113 12/13/24  0456 12/14/24  0618   * 119*  119* 129*     Monitor      Anemia  Anemia is likely due to chronic disease due to ideopathic . Most recent hemoglobin and hematocrit are listed below.  Recent Labs     12/12/24  1113 12/13/24  0456 12/14/24  0618   HGB 11.3* 10.6*  10.6* 11.3*   HCT 34.6* 33.0*  33.0* 35.6*     Plan  - Monitor serial CBC: Daily  - Transfuse PRBC if patient becomes hemodynamically unstable, symptomatic or H/H drops below 7/21.  - Patient has not received any PRBC transfusions to date  - Patient's anemia is currently stable  - F/U as outpatient    Acute deep vein thrombosis (DVT) of right lower extremity  Right venous thrombosis of femoral vein, popliteal and peroneal veins    Coronary artery disease involving native coronary artery of native heart without angina pectoris  Patient with known CAD s/p stent placement, which is controlled Will continue  apixaban, ASA, and Statin and monitor for S/Sx of angina/ACS. Continue to monitor on telemetry.     Aortic valve disease  Echocardiogram with evidence of aortic stenosis that is mild . The patient's most recent echocardiogram result is listed below. We will manage the valvular abnormality by HCTZ, Valsartan    Echo    Result Date: 3/21/2024    Left Ventricle: There is normal systolic function with a visually   estimated ejection fraction of 55 - 60%. Grade I diastolic dysfunction.    Right  Ventricle: Normal right ventricular cavity size. Wall thickness   is normal. Right ventricle wall motion  is normal. Systolic function is   normal.    Aortic Valve: The aortic valve is a trileaflet valve. Mildly restricted   motion. There is mild stenosis. Aortic valve area by VTI is 2.01 cm².   Aortic valve peak velocity is 1.91 m/s. Mean gradient is 8 mmHg. The   dimensionless index is 0.46.    Mitral Valve: There is mild regurgitation with a centrally directed   jet.    Tricuspid Valve: There is mild regurgitation.    Pulmonary Artery: No pulmonary hypertension. The estimated pulmonary   artery systolic pressure is 18 mmHg.          Chronic combined systolic and diastolic congestive heart failure  Patient has Combined Systolic and Diastolic heart failure that is Chronic. On presentation their CHF was well compensated. Most recent BNP and echo results are listed below.  Recent Labs     12/12/24  1113   *     Latest ECHO  Results for orders placed during the hospital encounter of 03/21/24    Echo    Interpretation Summary    Left Ventricle: There is normal systolic function with a visually estimated ejection fraction of 55 - 60%. Grade I diastolic dysfunction.    Right Ventricle: Normal right ventricular cavity size. Wall thickness is normal. Right ventricle wall motion  is normal. Systolic function is normal.    Aortic Valve: The aortic valve is a trileaflet valve. Mildly restricted motion. There is mild stenosis. Aortic valve area by VTI is 2.01 cm². Aortic valve peak velocity is 1.91 m/s. Mean gradient is 8 mmHg. The dimensionless index is 0.46.    Mitral Valve: There is mild regurgitation with a centrally directed jet.    Tricuspid Valve: There is mild regurgitation.    Pulmonary Artery: No pulmonary hypertension. The estimated pulmonary artery systolic pressure is 18 mmHg.    Current Heart Failure Medications  valsartan tablet 160 mg, Daily, Oral  hydroCHLOROthiazide tablet 12.5 mg, Daily,  Oral    Plan  - Monitor strict I&Os and daily weights.    - Place on telemetry  - Low sodium diet  - Place on fluid restriction of 1.5 L.   - Cardiology has been consulted  - The patient's volume status is at their baseline  - EF 40%        Aortic atherosclerosis  Continue Statin      Hypertension  Patient's blood pressure range in the last 24 hours was: BP  Min: 118/55  Max: 140/63.The patient's inpatient anti-hypertensive regimen is listed below:  Current Antihypertensives  valsartan tablet 160 mg, Daily, Oral  hydroCHLOROthiazide tablet 12.5 mg, Daily, Oral    Plan  Resume home bp medications     Cardiomyopathy  Card consult performed      Dyslipidemia  Continue atorvastatin     Hypothyroidism  Continue home levothyroxine         VTE Risk Mitigation (From admission, onward)           Ordered     apixaban tablet 5 mg  2 times daily         12/14/24 0838     IP VTE HIGH RISK PATIENT  Once         12/12/24 1547     Reason for no Mechanical VTE Prophylaxis  Once        Question:  Reasons:  Answer:  Patient is Ambulatory  Comment:  IV Heparin    12/12/24 1547                    Discharge Planning   CRISTINA: 12/14/2024     Code Status: Full Code   Medical Readiness for Discharge Date: 12/14/2024  Discharge Plan A: Home                        Danny Arellano MD  Department of Hospital Medicine   [facility] - Med Surg (3rd Fl)

## 2024-12-14 NOTE — H&P
[facility] ([address] [place] [address] Medicine  History & Physical    Patient Name: Giselle Rios  MRN: [id number]  Patient Class: IP- Inpatient  Admission Date: 12/12/2024  Attending Physician: Danny Arellano MD   Primary Care Provider: Rina Melara MD         Patient information was obtained from patient and ER records.     Subjective:     Principal Problem:Acute pulmonary embolism    Chief Complaint:   Chief Complaint   Patient presents with    Abnormal Ultrasound     Pt sent to ED per MD for DVT in the right leg seen on ultrasound pta.        HPI: Patient is a 84 year old female with medical history of HTN, HLD, CAD, hypothyroidism and obesity who presented to the ED with right lower extremity edema and SOB.  Patient had kyphoplasty in August and hasn't been as active since then but denies long periods of immobility.   She is up to date on all her annual check up's and screenings.  Over the past several weeks she has had pain and edema of right lower extremity.  She then developed right back pain and SOB.       Admitted for DVT and PE.          Past Medical History:   Diagnosis Date    Hypothyroidism     Obesity        Past Surgical History:   Procedure Laterality Date    APPENDECTOMY      cervical fusion x 2      HYSTERECTOMY         Review of patient's allergies indicates:  No Known Allergies    No current facility-administered medications on file prior to encounter.     Current Outpatient Medications on File Prior to Encounter   Medication Sig    ascorbic acid, vitamin C, (VITAMIN C) 500 mg TbSR take once daily    aspirin (ECOTRIN) 81 MG EC tablet Take 81 mg by mouth once daily.    b complex vitamins capsule Take 1 capsule by mouth once daily.    calcium carbonate (CALCIUM 600 ORAL) 1 tablet a day    cholecalciferol, vitamin D3, (VITAMIN D3) 1,000 unit capsule Take 1,000 Units by mouth once daily.    finasteride (PROSCAR) 5 mg tablet Take 5 mg by mouth once daily.    levothyroxine (SYNTHROID,  LEVOTHROID) 175 MCG tablet Take 175 mcg by mouth once daily.    MAGNESIUM ORAL Take by mouth once daily.    meloxicam (MOBIC) 7.5 MG tablet Take 7.5 mg by mouth daily as needed for Pain.    rosuvastatin (CRESTOR) 20 MG tablet Take 1 tablet (20 mg total) by mouth once daily. (Patient taking differently: Take 20 mg by mouth every evening.)    valsartan-hydrochlorothiazide (DIOVAN-HCT) 160-12.5 mg per tablet Take 1 tablet by mouth once daily.    zinc sulfate (ZINC-220 ORAL) Take by mouth once daily.    [DISCONTINUED] folic acid (FOLVITE) 800 MCG Tab Take 800 mcg by mouth once daily.     Family History       Problem Relation (Age of Onset)    Breast cancer Daughter    Diabetes Sister, Maternal Grandmother    Nephrolithiasis Daughter    Thyroid disease Paternal Uncle    Thyroid nodules Father          Tobacco Use    Smoking status: Never    Smokeless tobacco: Never   Substance and Sexual Activity    Alcohol use: No    Drug use: No    Sexual activity: Never     Review of Systems   Constitutional:  Positive for fatigue. Negative for chills and fever.   HENT:  Negative for ear discharge and ear pain.    Eyes:  Negative for pain and discharge.   Respiratory:  Positive for shortness of breath. Negative for cough.    Cardiovascular:  Positive for leg swelling. Negative for chest pain.   Gastrointestinal:  Negative for nausea and vomiting.   Endocrine: Negative for cold intolerance and heat intolerance.   Genitourinary:  Negative for difficulty urinating and dysuria.   Musculoskeletal:  Positive for arthralgias and back pain. Negative for joint swelling and myalgias.   Skin:  Negative for rash and wound.   Neurological:  Negative for dizziness and headaches.   Psychiatric/Behavioral:  Negative for agitation and confusion.      Objective:     Vital Signs (Most Recent):  Temp: 98 °F (36.7 °C) (12/13/24 1209)  Pulse: (!) 53 (12/13/24 1400)  Resp: 16 (12/13/24 1209)  BP: (!) 140/63 (12/13/24 1209)  SpO2: 97 % (12/13/24 1209) Vital  "Signs (24h Range):  Temp:  [97.5 °F (36.4 °C)-98.2 °F (36.8 °C)] 98 °F (36.7 °C)  Pulse:  [48-63] 53  Resp:  [16-20] 16  SpO2:  [96 %-100 %] 97 %  BP: (123-140)/(59-63) 140/63     Weight: 79.4 kg (175 lb)  Body mass index is 26.61 kg/m².     Physical Exam  Constitutional:       General: She is not in acute distress.  HENT:      Head: Normocephalic and atraumatic.   Eyes:      General:         Right eye: No discharge.         Left eye: No discharge.   Cardiovascular:      Rate and Rhythm: Normal rate and regular rhythm.   Pulmonary:      Effort: Pulmonary effort is normal.      Breath sounds: Normal breath sounds.   Abdominal:      General: There is no distension.      Tenderness: There is no abdominal tenderness.   Musculoskeletal:         General: No swelling or tenderness.      Cervical back: Neck supple. No tenderness.      Right lower leg: Edema present.   Skin:     General: Skin is warm and dry.   Neurological:      General: No focal deficit present.      Mental Status: She is alert and oriented to person, place, and time.   Psychiatric:         Mood and Affect: Mood normal.         Behavior: Behavior normal.                Significant Labs: A1C: No results for input(s): "HGBA1C" in the last 4320 hours.  ABGs: No results for input(s): "PH", "PCO2", "HCO3", "POCSATURATED", "BE", "TOTALHB", "COHB", "METHB", "O2HB", "POCFIO2", "PO2" in the last 48 hours.  Bilirubin:   Recent Labs   Lab 12/12/24  1113   BILITOT 0.6     Blood Culture: No results for input(s): "LABBLOO" in the last 48 hours.  BMP:   Recent Labs   Lab 12/13/24  0456   GLU 92      K 3.8      CO2 24   BUN 22   CREATININE 0.7   CALCIUM 8.7     CBC:   Recent Labs   Lab 12/12/24  1113 12/13/24  0456   WBC 6.38 4.22  4.22   HGB 11.3* 10.6*  10.6*   HCT 34.6* 33.0*  33.0*   * 119*  119*     CMP:   Recent Labs   Lab 12/12/24  1113 12/13/24  0456    143   K 4.5 3.8   * 110   CO2 22* 24   GLU 82 92   BUN 28* 22   CREATININE " "0.9 0.7   CALCIUM 9.2 8.7   PROT 7.0  --    ALBUMIN 3.8  --    BILITOT 0.6  --    ALKPHOS 60  --    AST 21  --    ALT 11  --    ANIONGAP 10 9     Cardiac Markers:   Recent Labs   Lab 12/12/24  1113   *     Coagulation:   Recent Labs   Lab 12/12/24  1303 12/12/24  1943 12/13/24  0456   INR 1.0  --   --    APTT 27.9   < > 126.1*    < > = values in this interval not displayed.     Lactic Acid: No results for input(s): "LACTATE" in the last 48 hours.  Lipase: No results for input(s): "LIPASE" in the last 48 hours.  Lipid Panel: No results for input(s): "CHOL", "HDL", "LDLCALC", "TRIG", "CHOLHDL" in the last 48 hours.  Magnesium: No results for input(s): "MG" in the last 48 hours.  POCT Glucose: No results for input(s): "POCTGLUCOSE" in the last 48 hours.  Prealbumin: No results for input(s): "PREALBUMIN" in the last 48 hours.  Respiratory Culture: No results for input(s): "GSRESP", "RESPIRATORYC" in the last 48 hours.  Troponin:   Recent Labs   Lab 12/12/24  1113   TROPONINI <0.006     TSH:   Recent Labs   Lab 10/15/24  1524   TSH 0.143*     Urine Culture: No results for input(s): "LABURIN" in the last 48 hours.  Urine Studies: No results for input(s): "COLORU", "APPEARANCEUA", "PHUR", "SPECGRAV", "PROTEINUA", "GLUCUA", "KETONESU", "BILIRUBINUA", "OCCULTUA", "NITRITE", "UROBILINOGEN", "LEUKOCYTESUR", "RBCUA", "WBCUA", "BACTERIA", "SQUAMEPITHEL", "HYALINECASTS" in the last 48 hours.    Invalid input(s): "WRIGHTSUR"    Significant Imaging: I have reviewed all pertinent imaging results/findings within the past 24 hours.  Assessment/Plan:     * Acute pulmonary embolism  Seen on CTA chest  IV heparin gtt  Likely due to decreased mobility but will defer further workup to cardiology  Plan to discharge with po Eliquis tomorrow   Cardiology consulted   Patient chest pain and SOB improving       Acute deep vein thrombosis (DVT) of right lower extremity  Right venous thrombosis of femoral vein, popliteal and peroneal " veins    Hypertension  Patient's blood pressure range in the last 24 hours was: BP  Min: 123/59  Max: 140/63.The patient's inpatient anti-hypertensive regimen is listed below:  Current Antihypertensives  valsartan tablet 160 mg, Daily, Oral  hydroCHLOROthiazide tablet 12.5 mg, Daily, Oral    Plan  Resume home bp medications     Dyslipidemia  Continue atorvastatin     Hypothyroidism  Continue home levothyroxine         VTE Risk Mitigation (From admission, onward)           Ordered     IP VTE HIGH RISK PATIENT  Once         12/12/24 1547     Reason for no Mechanical VTE Prophylaxis  Once        Question:  Reasons:  Answer:  Patient is Ambulatory  Comment:  IV Heparin    12/12/24 1547     heparin 25,000 units in dextrose 5% (100 units/ml) IV bolus from bag HIGH INTENSITY nomogram - [facility]  As needed (PRN)        Question:  Heparin Infusion Adjustment (DO NOT MODIFY ANSWER)  Answer:  [email or web address]\epic\Images\Pharmacy\HeparinInfusions\heparin HIGH INTENSITY nomogram for [facility] [id number]    12/12/24 1256     heparin 25,000 units in dextrose 5% (100 units/ml) IV bolus from bag HIGH INTENSITY nomogram - [facility]  As needed (PRN)        Question:  Heparin Infusion Adjustment (DO NOT MODIFY ANSWER)  Answer:  [email or web address]\epic\Images\Pharmacy\HeparinInfusions\heparin HIGH INTENSITY nomogram for [facility] [id number]    12/12/24 1256     heparin 25,000 units in dextrose 5% 250 mL (100 units/mL) infusion HIGH INTENSITY nomogram - [facility]  Continuous        Question:  Begin at (units/kg/hr)  Answer:  18    12/12/24 1256                                    Lashanda Blas PA-C  Department of Hospital Medicine  [facility] ([facility])

## 2024-12-14 NOTE — ASSESSMENT & PLAN NOTE
Patient with known CAD s/p stent placement, which is controlled Will continue  apixaban, ASA, and Statin and monitor for S/Sx of angina/ACS. Continue to monitor on telemetry.

## 2024-12-14 NOTE — ASSESSMENT & PLAN NOTE
Anemia is likely due to chronic disease due to ideopathic . Most recent hemoglobin and hematocrit are listed below.  Recent Labs     12/12/24  1113 12/13/24  0456 12/14/24  0618   HGB 11.3* 10.6*  10.6* 11.3*   HCT 34.6* 33.0*  33.0* 35.6*     Plan  - Monitor serial CBC: Daily  - Transfuse PRBC if patient becomes hemodynamically unstable, symptomatic or H/H drops below 7/21.  - Patient has not received any PRBC transfusions to date  - Patient's anemia is currently stable  - F/U as outpatient

## 2024-12-14 NOTE — ASSESSMENT & PLAN NOTE
Patient has Combined Systolic and Diastolic heart failure that is Chronic. On presentation their CHF was well compensated. Most recent BNP and echo results are listed below.  Recent Labs     12/12/24  1113   *     Latest ECHO  Results for orders placed during the hospital encounter of 03/21/24    Echo    Interpretation Summary    Left Ventricle: There is normal systolic function with a visually estimated ejection fraction of 55 - 60%. Grade I diastolic dysfunction.    Right Ventricle: Normal right ventricular cavity size. Wall thickness is normal. Right ventricle wall motion  is normal. Systolic function is normal.    Aortic Valve: The aortic valve is a trileaflet valve. Mildly restricted motion. There is mild stenosis. Aortic valve area by VTI is 2.01 cm². Aortic valve peak velocity is 1.91 m/s. Mean gradient is 8 mmHg. The dimensionless index is 0.46.    Mitral Valve: There is mild regurgitation with a centrally directed jet.    Tricuspid Valve: There is mild regurgitation.    Pulmonary Artery: No pulmonary hypertension. The estimated pulmonary artery systolic pressure is 18 mmHg.    Current Heart Failure Medications  valsartan tablet 160 mg, Daily, Oral  hydroCHLOROthiazide tablet 12.5 mg, Daily, Oral    Plan  - Monitor strict I&Os and daily weights.    - Place on telemetry  - Low sodium diet  - Place on fluid restriction of 1.5 L.   - Cardiology has been consulted  - The patient's volume status is at their baseline  - EF 40%

## 2024-12-14 NOTE — ASSESSMENT & PLAN NOTE
Patient's blood pressure range in the last 24 hours was: BP  Min: 123/59  Max: 140/63.The patient's inpatient anti-hypertensive regimen is listed below:  Current Antihypertensives  valsartan tablet 160 mg, Daily, Oral  hydroCHLOROthiazide tablet 12.5 mg, Daily, Oral    Plan  Resume home bp medications

## 2024-12-14 NOTE — PLAN OF CARE
Problem: Adult Inpatient Plan of Care  Goal: Plan of Care Review  Outcome: Progressing     Problem: Adult Inpatient Plan of Care  Goal: Patient-Specific Goal (Individualized)  Outcome: Progressing     Problem: Adult Inpatient Plan of Care  Goal: Optimal Comfort and Wellbeing  Outcome: Progressing     Problem: Fall Injury Risk  Goal: Absence of Fall and Fall-Related Injury  Outcome: Progressing

## 2024-12-14 NOTE — ASSESSMENT & PLAN NOTE
The likely etiology of thrombocytopenia is  multifactorial . The patients 3 most recent labs are listed below.  Recent Labs     12/12/24  1113 12/13/24  0456 12/14/24  0618   * 119*  119* 129*       Monitor

## 2024-12-14 NOTE — ASSESSMENT & PLAN NOTE
Seen on CTA chest  IV heparin gtt  Likely due to decreased mobility but will defer further workup to cardiology  Plan to discharge with po Eliquis tomorrow   Cardiology consulted   Patient chest pain and SOB improving

## 2024-12-14 NOTE — ASSESSMENT & PLAN NOTE
Echocardiogram with evidence of aortic stenosis that is mild . The patient's most recent echocardiogram result is listed below. We will manage the valvular abnormality by HCTZ, Valsartan    Echo    Result Date: 3/21/2024    Left Ventricle: There is normal systolic function with a visually   estimated ejection fraction of 55 - 60%. Grade I diastolic dysfunction.    Right Ventricle: Normal right ventricular cavity size. Wall thickness   is normal. Right ventricle wall motion  is normal. Systolic function is   normal.    Aortic Valve: The aortic valve is a trileaflet valve. Mildly restricted   motion. There is mild stenosis. Aortic valve area by VTI is 2.01 cm².   Aortic valve peak velocity is 1.91 m/s. Mean gradient is 8 mmHg. The   dimensionless index is 0.46.    Mitral Valve: There is mild regurgitation with a centrally directed   jet.    Tricuspid Valve: There is mild regurgitation.    Pulmonary Artery: No pulmonary hypertension. The estimated pulmonary   artery systolic pressure is 18 mmHg.

## 2024-12-14 NOTE — SUBJECTIVE & OBJECTIVE
Interval History: doing well.  Ready for discharge    Review of Systems   Constitutional:  Negative for activity change, chills, fatigue, fever and unexpected weight change.   HENT:  Negative for sore throat and trouble swallowing.    Respiratory:  Negative for cough, chest tightness and shortness of breath.    Cardiovascular:  Positive for leg swelling. Negative for chest pain.   Gastrointestinal:  Negative for abdominal pain.   Endocrine: Negative for cold intolerance and heat intolerance.   Genitourinary:  Negative for difficulty urinating.   Musculoskeletal:  Negative for back pain and joint swelling.   Skin:  Negative for rash.   Neurological:  Negative for numbness.   Hematological:  Negative for adenopathy.   Psychiatric/Behavioral:  Negative for decreased concentration.      Objective:     Vital Signs (Most Recent):  Temp: 98.2 °F (36.8 °C) (12/14/24 0732)  Pulse: 60 (12/14/24 0803)  Resp: 18 (12/14/24 0732)  BP: 133/60 (12/14/24 0732)  SpO2: (!) 94 % (12/14/24 0745) Vital Signs (24h Range):  Temp:  [97.8 °F (36.6 °C)-98.3 °F (36.8 °C)] 98.2 °F (36.8 °C)  Pulse:  [51-63] 60  Resp:  [16-20] 18  SpO2:  [94 %-98 %] 94 %  BP: (118-140)/(55-67) 133/60     Weight: 79.4 kg (175 lb)  Body mass index is 26.61 kg/m².    Intake/Output Summary (Last 24 hours) at 12/14/2024 1012  Last data filed at 12/14/2024 0838  Gross per 24 hour   Intake 687.37 ml   Output --   Net 687.37 ml         Physical Exam  Constitutional:       Appearance: Normal appearance. She is well-developed and normal weight.   Neck:      Comments: No JVD  Cardiovascular:      Rate and Rhythm: Normal rate and regular rhythm.      Pulses: Normal pulses.      Heart sounds: Normal heart sounds. No murmur heard.     No friction rub. No gallop.   Pulmonary:      Effort: Pulmonary effort is normal.      Breath sounds: Normal breath sounds.   Musculoskeletal:         General: No tenderness.      Cervical back: Normal range of motion and neck supple.      Right  lower leg: No edema.      Left lower leg: No edema.   Neurological:      General: No focal deficit present.      Mental Status: She is alert and oriented to person, place, and time. Mental status is at baseline.   Psychiatric:         Mood and Affect: Mood normal.             Significant Labs: All pertinent labs within the past 24 hours have been reviewed.  BMP:   Recent Labs   Lab 12/13/24  0456   GLU 92      K 3.8      CO2 24   BUN 22   CREATININE 0.7   CALCIUM 8.7     CBC:   Recent Labs   Lab 12/12/24  1113 12/13/24  0456 12/14/24  0618   WBC 6.38 4.22  4.22 4.92   HGB 11.3* 10.6*  10.6* 11.3*   HCT 34.6* 33.0*  33.0* 35.6*   * 119*  119* 129*     TSH:   Recent Labs   Lab 10/15/24  1524   TSH 0.143*       Significant Imaging: I have reviewed all pertinent imaging results/findings within the past 24 hours.  I have reviewed and interpreted all pertinent imaging results/findings within the past 24 hours.

## 2024-12-14 NOTE — NURSING
Patient discharge note was updated by physician. Discussed discharge with patients. Printed discharge paperwork and provided patient with copy., reading over important points to patient. All questions answered at this time. Patients VSS stable . Patient transported home via wheel chair accompanied by [facility] staff.l

## 2024-12-14 NOTE — ASSESSMENT & PLAN NOTE
Seen on CTA chest  IV heparin gtt.  Start Eloquis this morning.  Stop heparin at 11 am then discharge pt  Likely due to decreased mobility but will defer further workup to cardiology  Plan to discharge with po Eliquis tomorrow   Cardiology consulted   Patient chest pain and SOB resolved  F/U with me this Wednesday

## 2024-12-14 NOTE — ASSESSMENT & PLAN NOTE
Patient's blood pressure range in the last 24 hours was: BP  Min: 118/55  Max: 140/63.The patient's inpatient anti-hypertensive regimen is listed below:  Current Antihypertensives  valsartan tablet 160 mg, Daily, Oral  hydroCHLOROthiazide tablet 12.5 mg, Daily, Oral    Plan  Resume home bp medications

## 2024-12-14 NOTE — ASSESSMENT & PLAN NOTE
Patient has Combined Systolic and Diastolic heart failure that is Chronic. On presentation their CHF was well compensated. Most recent BNP and echo results are listed below.  Recent Labs     12/12/24  1113   *       Latest ECHO  Results for orders placed during the hospital encounter of 03/21/24    Echo    Interpretation Summary    Left Ventricle: There is normal systolic function with a visually estimated ejection fraction of 55 - 60%. Grade I diastolic dysfunction.    Right Ventricle: Normal right ventricular cavity size. Wall thickness is normal. Right ventricle wall motion  is normal. Systolic function is normal.    Aortic Valve: The aortic valve is a trileaflet valve. Mildly restricted motion. There is mild stenosis. Aortic valve area by VTI is 2.01 cm². Aortic valve peak velocity is 1.91 m/s. Mean gradient is 8 mmHg. The dimensionless index is 0.46.    Mitral Valve: There is mild regurgitation with a centrally directed jet.    Tricuspid Valve: There is mild regurgitation.    Pulmonary Artery: No pulmonary hypertension. The estimated pulmonary artery systolic pressure is 18 mmHg.    Current Heart Failure Medications  valsartan tablet 160 mg, Daily, Oral  hydroCHLOROthiazide tablet 12.5 mg, Daily, Oral    Plan  - Monitor strict I&Os and daily weights.    - Place on telemetry  - Low sodium diet  - Place on fluid restriction of 1.5 L.   - Cardiology has been consulted  - The patient's volume status is at their baseline  - EF 40%

## 2024-12-14 NOTE — DISCHARGE SUMMARY
[facility] ([facility])  [facility] Medicine  Discharge Summary      Patient Name: Giselle Rios  MRN: [id number]  TOVA: [id number]  Patient Class: IP- Inpatient  Admission Date: 12/12/2024  Hospital Length of Stay: 2 days  Discharge Date and Time:  12/14/2024 10:26 AM  Attending Physician: Danny Arellano MD   Discharging Provider: Danny Arellano MD  Primary Care Provider: Rina Melara MD    Primary Care Team: Networked reference to record PCT     HPI:   Patient is a 84 year old female with medical history of HTN, HLD, CAD, hypothyroidism and obesity who presented to the ED with right lower extremity edema and SOB.  Patient had kyphoplasty in August and hasn't been as active since then but denies long periods of immobility.   She is up to date on all her annual check up's and screenings.  Over the past several weeks she has had pain and edema of right lower extremity.  She then developed right back pain and SOB.       Admitted for DVT and PE.          * No surgery found *      Hospital Course:   Patient feeling great.  Denies dyspnea with exertion.  She wants to go home and go to grandson's birthday party.       Goals of Care Treatment Preferences:  Code Status: Full Code      SDOH Screening:  The patient was screened for utility difficulties, food insecurity, transport difficulties, housing insecurity, and interpersonal safety and there were no concerns identified this admission.     Consults:   Consults (From admission, onward)          Status Ordering Provider     Inpatient consult to Cardiology-[facility]  Once        Provider:  Skylar Daniels NP    Completed MIGUELITO TODD            * Acute pulmonary embolism  Seen on CTA chest  IV heparin gtt.  Start Eloquis this morning.  Stop heparin at 11 am then discharge pt  Likely due to decreased mobility but will defer further workup to cardiology  Plan to discharge with po Eliquis tomorrow   Cardiology consulted   Patient chest pain and  SOB resolved  F/U with me this Wednesday      Thrombocytopenia  The likely etiology of thrombocytopenia is  multifactorial . The patients 3 most recent labs are listed below.  Recent Labs     12/12/24  1113 12/13/24  0456 12/14/24  0618   * 119*  119* 129*       Monitor      Anemia  Anemia is likely due to chronic disease due to ideopathic . Most recent hemoglobin and hematocrit are listed below.  Recent Labs     12/12/24  1113 12/13/24  0456 12/14/24  0618   HGB 11.3* 10.6*  10.6* 11.3*   HCT 34.6* 33.0*  33.0* 35.6*       Plan  - Monitor serial CBC: Daily  - Transfuse PRBC if patient becomes hemodynamically unstable, symptomatic or H/H drops below 7/21.  - Patient has not received any PRBC transfusions to date  - Patient's anemia is currently stable  - F/U as outpatient    Acute deep vein thrombosis (DVT) of right lower extremity  Right venous thrombosis of femoral vein, popliteal and peroneal veins    Coronary artery disease involving native coronary artery of native heart without angina pectoris  Patient with known CAD s/p stent placement, which is controlled Will continue  apixaban, ASA, and Statin and monitor for S/Sx of angina/ACS. Continue to monitor on telemetry.     Aortic valve disease  Echocardiogram with evidence of aortic stenosis that is mild . The patient's most recent echocardiogram result is listed below. We will manage the valvular abnormality by HCTZ, Valsartan    Echo    Result Date: 3/21/2024    Left Ventricle: There is normal systolic function with a visually   estimated ejection fraction of 55 - 60%. Grade I diastolic dysfunction.    Right Ventricle: Normal right ventricular cavity size. Wall thickness   is normal. Right ventricle wall motion  is normal. Systolic function is   normal.    Aortic Valve: The aortic valve is a trileaflet valve. Mildly restricted   motion. There is mild stenosis. Aortic valve area by VTI is 2.01 cm².   Aortic valve peak velocity is 1.91 m/s. Mean  gradient is 8 mmHg. The   dimensionless index is 0.46.    Mitral Valve: There is mild regurgitation with a centrally directed   jet.    Tricuspid Valve: There is mild regurgitation.    Pulmonary Artery: No pulmonary hypertension. The estimated pulmonary   artery systolic pressure is 18 mmHg.          Chronic combined systolic and diastolic congestive heart failure  Patient has Combined Systolic and Diastolic heart failure that is Chronic. On presentation their CHF was well compensated. Most recent BNP and echo results are listed below.  Recent Labs     12/12/24  1113   *       Latest ECHO  Results for orders placed during the hospital encounter of 03/21/24    Echo    Interpretation Summary    Left Ventricle: There is normal systolic function with a visually estimated ejection fraction of 55 - 60%. Grade I diastolic dysfunction.    Right Ventricle: Normal right ventricular cavity size. Wall thickness is normal. Right ventricle wall motion  is normal. Systolic function is normal.    Aortic Valve: The aortic valve is a trileaflet valve. Mildly restricted motion. There is mild stenosis. Aortic valve area by VTI is 2.01 cm². Aortic valve peak velocity is 1.91 m/s. Mean gradient is 8 mmHg. The dimensionless index is 0.46.    Mitral Valve: There is mild regurgitation with a centrally directed jet.    Tricuspid Valve: There is mild regurgitation.    Pulmonary Artery: No pulmonary hypertension. The estimated pulmonary artery systolic pressure is 18 mmHg.    Current Heart Failure Medications  valsartan tablet 160 mg, Daily, Oral  hydroCHLOROthiazide tablet 12.5 mg, Daily, Oral    Plan  - Monitor strict I&Os and daily weights.    - Place on telemetry  - Low sodium diet  - Place on fluid restriction of 1.5 L.   - Cardiology has been consulted  - The patient's volume status is at their baseline  - EF 40%        Aortic atherosclerosis  Continue Statin      Hypertension  Patient's blood pressure range in the last 24 hours  was: BP  Min: 118/55  Max: 140/63.The patient's inpatient anti-hypertensive regimen is listed below:  Current Antihypertensives  valsartan tablet 160 mg, Daily, Oral  hydroCHLOROthiazide tablet 12.5 mg, Daily, Oral    Plan  Resume home bp medications     Cardiomyopathy  Card consult performed      Dyslipidemia  Continue atorvastatin     Hypothyroidism  Continue home levothyroxine         Final Active Diagnoses:    Diagnosis Date Noted POA    PRINCIPAL PROBLEM:  Acute pulmonary embolism [I26.99] 12/13/2024 Yes    Anemia [D64.9] 12/14/2024 Unknown    Thrombocytopenia [D69.6] 12/14/2024 Unknown    Acute deep vein thrombosis (DVT) of right lower extremity [I82.401] 12/13/2024 Yes    Coronary artery disease involving native coronary artery of native heart without angina pectoris [I25.10] 09/10/2024 Yes    Aortic valve disease [I35.9] 09/10/2024 Yes    Chronic combined systolic and diastolic congestive heart failure [I50.42] 03/19/2024 Yes    Cardiomyopathy [I42.9] 10/17/2023 Yes    Hypertension [I10] 10/17/2023 Yes    Aortic atherosclerosis [I70.0] 10/17/2023 Yes    Dyslipidemia [E78.5] 11/20/2019 Yes    Hypothyroidism [E03.9] 11/20/2019 Yes      Problems Resolved During this Admission:       Discharged Condition: good    Disposition: Home or Self Care    Follow Up:   Follow-up Information       Danny Arellano MD Follow up on 12/18/2024.    Specialty: Family Medicine  Contact information:  [address] [place] [address] DR Darlin [place]INO [address] [phone]                           Patient Instructions:   No discharge procedures on file.    Significant Diagnostic Studies: Labs: BMP:   Recent Labs   Lab 12/12/24  1113 12/13/24  0456   GLU 82 92    143   K 4.5 3.8   * 110   CO2 22* 24   BUN 28* 22   CREATININE 0.9 0.7   CALCIUM 9.2 8.7    and CBC   Recent Labs   Lab 12/12/24  1113 12/13/24  0456 12/14/24  0618   WBC 6.38 4.22  4.22 4.92   HGB 11.3* 10.6*  10.6* 11.3*   HCT 34.6* 33.0*  33.0* 35.6*   * 119*   119* 129*       Pending Diagnostic Studies:       Procedure Component Value Units Date/Time    Echo [[id number]]     Order Status: Sent Lab Status: No result     Echo [[id number]]     Order Status: Sent Lab Status: No result            Medications:  Reconciled Home Medications:      Medication List        START taking these medications      apixaban 5 mg Tab  Commonly known as: ELIQUIS  Take 1 tablet (5 mg total) by mouth 2 (two) times daily.            CHANGE how you take these medications      rosuvastatin 20 MG tablet  Commonly known as: CRESTOR  Take 1 tablet (20 mg total) by mouth once daily.  What changed: when to take this            CONTINUE taking these medications      aspirin 81 MG EC tablet  Commonly known as: ECOTRIN  Take 81 mg by mouth once daily.     b complex vitamins capsule  Take 1 capsule by mouth once daily.     CALCIUM 600 ORAL  1 tablet a day     finasteride 5 mg tablet  Commonly known as: PROSCAR  Take 5 mg by mouth once daily.     levothyroxine 175 MCG tablet  Commonly known as: SYNTHROID, LEVOTHROID  Take 175 mcg by mouth once daily.     MAGNESIUM ORAL  Take by mouth once daily.     meloxicam 7.5 MG tablet  Commonly known as: MOBIC  Take 7.5 mg by mouth daily as needed for Pain.     valsartan-hydrochlorothiazide 160-12.5 mg per tablet  Commonly known as: DIOVAN-HCT  Take 1 tablet by mouth once daily.     VITAMIN C 500 mg Tbsr  Generic drug: ascorbic acid (vitamin C)  take once daily     VITAMIN D3 25 mcg (1,000 unit) capsule  Generic drug: cholecalciferol (vitamin D3)  Take 1,000 Units by mouth once daily.     ZINC-220 ORAL  Take by mouth once daily.              Indwelling Lines/Drains at time of discharge:   Lines/Drains/Airways       None                   Time spent on the discharge of patient: 30 minutes         Danny Arellano MD  Department of Hospital Medicine  [place] [facility] (3rd Fl)

## 2024-12-18 ENCOUNTER — OFFICE VISIT (OUTPATIENT)
Dept: INTERNAL MEDICINE | Facility: CLINIC | Age: 84
End: 2024-12-18
Payer: MEDICARE

## 2024-12-18 VITALS
RESPIRATION RATE: 16 BRPM | HEART RATE: 60 BPM | BODY MASS INDEX: 27.13 KG/M2 | OXYGEN SATURATION: 98 % | DIASTOLIC BLOOD PRESSURE: 88 MMHG | SYSTOLIC BLOOD PRESSURE: 134 MMHG | WEIGHT: 179 LBS | HEIGHT: 68 IN

## 2024-12-18 DIAGNOSIS — E78.5 DYSLIPIDEMIA: ICD-10-CM

## 2024-12-18 DIAGNOSIS — S06.5XAA SDH (SUBDURAL HEMATOMA): ICD-10-CM

## 2024-12-18 DIAGNOSIS — I35.9 AORTIC VALVE DISEASE: ICD-10-CM

## 2024-12-18 DIAGNOSIS — E03.9 HYPOTHYROIDISM, UNSPECIFIED TYPE: ICD-10-CM

## 2024-12-18 DIAGNOSIS — I26.99 OTHER ACUTE PULMONARY EMBOLISM WITHOUT ACUTE COR PULMONALE: ICD-10-CM

## 2024-12-18 DIAGNOSIS — I10 PRIMARY HYPERTENSION: ICD-10-CM

## 2024-12-18 DIAGNOSIS — Z78.0 MENOPAUSE: ICD-10-CM

## 2024-12-18 DIAGNOSIS — D69.6 THROMBOCYTOPENIA: ICD-10-CM

## 2024-12-18 DIAGNOSIS — I82.491 ACUTE DEEP VEIN THROMBOSIS (DVT) OF OTHER SPECIFIED VEIN OF RIGHT LOWER EXTREMITY: Primary | ICD-10-CM

## 2024-12-18 DIAGNOSIS — I26.99 ACUTE PULMONARY EMBOLISM WITHOUT ACUTE COR PULMONALE, UNSPECIFIED PULMONARY EMBOLISM TYPE: ICD-10-CM

## 2024-12-18 PROCEDURE — 99999 PR PBB SHADOW E&M-EST. PATIENT-LVL III: CPT | Mod: PBBFAC,,, | Performed by: FAMILY MEDICINE

## 2024-12-18 PROCEDURE — 1160F RVW MEDS BY RX/DR IN RCRD: CPT | Mod: CPTII,S$GLB,, | Performed by: FAMILY MEDICINE

## 2024-12-18 PROCEDURE — 1159F MED LIST DOCD IN RCRD: CPT | Mod: CPTII,S$GLB,, | Performed by: FAMILY MEDICINE

## 2024-12-18 PROCEDURE — 1125F AMNT PAIN NOTED PAIN PRSNT: CPT | Mod: CPTII,S$GLB,, | Performed by: FAMILY MEDICINE

## 2024-12-18 PROCEDURE — 3075F SYST BP GE 130 - 139MM HG: CPT | Mod: CPTII,S$GLB,, | Performed by: FAMILY MEDICINE

## 2024-12-18 PROCEDURE — 1111F DSCHRG MED/CURRENT MED MERGE: CPT | Mod: CPTII,S$GLB,, | Performed by: FAMILY MEDICINE

## 2024-12-18 PROCEDURE — 1101F PT FALLS ASSESS-DOCD LE1/YR: CPT | Mod: CPTII,S$GLB,, | Performed by: FAMILY MEDICINE

## 2024-12-18 PROCEDURE — 99214 OFFICE O/P EST MOD 30 MIN: CPT | Mod: S$GLB,,, | Performed by: FAMILY MEDICINE

## 2024-12-18 PROCEDURE — 3079F DIAST BP 80-89 MM HG: CPT | Mod: CPTII,S$GLB,, | Performed by: FAMILY MEDICINE

## 2024-12-18 PROCEDURE — 3288F FALL RISK ASSESSMENT DOCD: CPT | Mod: CPTII,S$GLB,, | Performed by: FAMILY MEDICINE

## 2024-12-18 RX ORDER — VALSARTAN AND HYDROCHLOROTHIAZIDE 160; 12.5 MG/1; MG/1
1 TABLET, FILM COATED ORAL DAILY
Qty: 90 TABLET | Refills: 1 | Status: SHIPPED | OUTPATIENT
Start: 2024-12-18

## 2024-12-18 RX ORDER — ROSUVASTATIN CALCIUM 20 MG/1
20 TABLET, COATED ORAL DAILY
Qty: 90 TABLET | Refills: 1 | Status: SHIPPED | OUTPATIENT
Start: 2024-12-18

## 2024-12-18 RX ORDER — ASPIRIN 81 MG/1
81 TABLET ORAL DAILY
COMMUNITY
Start: 2024-12-18

## 2024-12-18 NOTE — PROGRESS NOTES
Subjective:       Patient ID: Giselle Rios is a 84 y.o. female.    Chief Complaint: Hospital Follow Up (Pt is here today for a hospital follow up)      Transitional Care Note    Family and/or Caretaker present at visit?  Yes.  Diagnostic tests reviewed/disposition: I have reviewed all completed as well as pending diagnostic tests at the time of discharge.  Disease/illness education: PE, DVT  Home health/community services discussion/referrals: Patient does not have home health established from hospital visit.  They do not need home health.  If needed, we will set up home health for the patient.   Establishment or re-establishment of referral orders for community resources: No other necessary community resources.   Discussion with other health care providers: No discussion with other health care providers necessary.     HPI  History of Present Illness    CHIEF COMPLAINT:  Patient presents today for follow-up after hospitalization for a blood clot.    PULMONARY EMBOLISM:  She had a blood clot in the lung with associated pulmonary edema. Swelling has improved significantly. She is currently on Eliquis with daily baby aspirin.    CARDIOVASCULAR:  She has preserved EF heart failure with diastolic dysfunction, though she denies having congestive heart failure.    MEDICAL HISTORY:  She has a history of subdural hematoma and needs to avoid falling.    THYROID DISORDER:  Her thyroid has stopped functioning effectively but was not removed. She is under the care of Dr. Li and requires indefinite thyroid medication management.    MUSCULOSKELETAL:  She reports having a bad knee and missed a scheduled knee injection due to hospitalization.    CURRENT MEDICATIONS:  She takes Synthroid 175 mcg daily, Diovan HCTZ for blood pressure, and Rosuvastatin for cholesterol control.    PHYSICAL ACTIVITY:  She has been inactive since [facility] but is trying to increase activity by walking more at home, including around the kitchen and  archie. She acknowledges being overweight.    FAMILY HISTORY:  Mother had cancer and father had thyroid issues.      ROS:  General: -fever, -chills, -fatigue, -weight gain, -weight loss  Eyes: -vision changes, -redness, -discharge  ENT: -ear pain, -nasal congestion, -sore throat  Cardiovascular: -chest pain, -palpitations, -lower extremity edema  Respiratory: -cough, -shortness of breath  Gastrointestinal: -abdominal pain, -nausea, -vomiting, -diarrhea, -constipation, -blood in stool  Genitourinary: -dysuria, -hematuria, -frequency  Musculoskeletal: +joint pain, -muscle pain  Skin: -rash, -lesion  Neurological: -headache, -dizziness, -numbness, -tingling  Psychiatric: -anxiety, -depression, -sleep difficulty       Review of Systems    Physical Exam    General: No acute distress. Well-developed. Well-nourished.  Eyes: EOMI. Sclerae anicteric.  HENT: Normocephalic. Atraumatic. Nares patent. Moist oral mucosa.  Ears: Bilateral TMs clear. Bilateral EACs clear.  Cardiovascular: Regular rate. Regular rhythm. Slight murmur. No rubs. No gallops. Normal S1, S2.  Respiratory: Normal respiratory effort. Clear to auscultation bilaterally. No rales. No rhonchi. No wheezing.  Abdomen: Soft. Non-tender. Non-distended. Normoactive bowel sounds.  Musculoskeletal: No  obvious deformity.  Extremities: No lower extremity edema.  Neurological: Alert & oriented x3. No slurred speech. Normal gait.  Psychiatric: Normal mood. Normal affect. Good insight. Good judgment.  Skin: Warm. Dry. No rash.       Objective:      Vitals:    12/18/24 1045   BP: 134/88   Pulse: 60   Resp: 16     Physical Exam    Lab Results   Component Value Date    HGBA1C 5.4 02/28/2021     Lab Results   Component Value Date    WBC 4.92 12/14/2024    HGB 11.3 (L) 12/14/2024    HCT 35.6 (L) 12/14/2024    MCV 95 12/14/2024     (L) 12/14/2024       BMP  Lab Results   Component Value Date     12/13/2024    K 3.8 12/13/2024     12/13/2024    CO2 24  12/13/2024    BUN 22 12/13/2024    CREATININE 0.7 12/13/2024    CALCIUM 8.7 12/13/2024    ANIONGAP 9 12/13/2024    EGFRNORACEVR >60 12/13/2024       Assessment:       1. Acute deep vein thrombosis (DVT) of other specified vein of right lower extremity    2. Aortic valve disease    3. Other acute pulmonary embolism without acute cor pulmonale    4. Thrombocytopenia    5. Hypothyroidism, unspecified type    6. Acute pulmonary embolism without acute cor pulmonale, unspecified pulmonary embolism type    7. Primary hypertension    8. Dyslipidemia    9. SDH (subdural hematoma)        Plan:   1. Acute deep vein thrombosis (DVT) of other specified vein of right lower extremity  Overview:  Right venous thrombosis of femoral vein, popliteal and peroneal veins  Continue Eliquis    Orders:  -     apixaban (ELIQUIS) 5 mg Tab; Take 1 tablet (5 mg total) by mouth 2 (two) times daily.  Dispense: 60 tablet; Refill: 0  -     aspirin (ECOTRIN) 81 MG EC tablet; Take 1 tablet (81 mg total) by mouth once daily.    2. Aortic valve disease  Overview:  Echocardiogram with evidence of aortic stenosis that is mild . The patient's most recent echocardiogram result is listed below. We will manage the valvular abnormality by HCTZ, Valsartan       3. Other acute pulmonary embolism without acute cor pulmonale  Overview:  Seen on CTA chest  IV heparin gtt completed.  Started Eloquis day of discharge.    Likely due to decreased mobility but will defer further workup to cardiology  Cardiology consulted   Patient chest pain and SOB resolved      4. Thrombocytopenia  Overview:  Monitor CBC and bleeding  Patient understands to avoid falls    Orders:  -     CBC Auto Differential; Future; Expected date: 12/18/2024    5. Hypothyroidism, unspecified type  Overview:  Lab Results   Component Value Date    TSH 0.143 (L) 10/15/2024   Keep appt with Dr De Jesus  Continue Levothyroxine 175 mcg daily      6. Acute pulmonary embolism without acute cor pulmonale,  unspecified pulmonary embolism type  Overview:  Seen on CTA chest  IV heparin gtt completed.  Started Eloquis day of discharge.    Likely due to decreased mobility but will defer further workup to cardiology  Cardiology consulted   Patient chest pain and SOB resolved    Orders:  -     apixaban (ELIQUIS) 5 mg Tab; Take 1 tablet (5 mg total) by mouth 2 (two) times daily.  Dispense: 60 tablet; Refill: 0  -     aspirin (ECOTRIN) 81 MG EC tablet; Take 1 tablet (81 mg total) by mouth once daily.    7. Primary hypertension  -     Basic Metabolic Panel; Future; Expected date: 12/18/2024  -     valsartan-hydrochlorothiazide (DIOVAN-HCT) 160-12.5 mg per tablet; Take 1 tablet by mouth once daily.  Dispense: 90 tablet; Refill: 1    8. Dyslipidemia  Overview:  Continue atorvastatin        Orders:  -     LIPID PANEL; Future; Expected date: 12/18/2024  -     rosuvastatin (CRESTOR) 20 MG tablet; Take 1 tablet (20 mg total) by mouth once daily.  Dispense: 90 tablet; Refill: 1    9. SDH (subdural hematoma)  Overview:  SDH occurred 2019 after fall in bathroom.  Very stable         Assessment & Plan    IMPRESSION:  - Assessed recovery from recent hospitalization for pulmonary embolism and DVT  - Evaluated response to anticoagulation therapy with Eliquis  - Reviewed diagnosis of preserved EF heart failure (HFpEF) with diastolic dysfunction  - Considered risk factors for thrombosis, including advanced age, hypertension, and reduced mobility  - Assessed thyroid function and management  - Evaluated cardiovascular health, including blood pressure and cholesterol management  - Considered need for follow-up with cardiologist Dr. Anderson, deemed unnecessary at this time    HEART FAILURE:  - Explained the difference between various types of heart failure, including preserved EF heart failure.    DEEP VEIN THROMBOSIS AND PULMONARY EMBOLISM:  - Discussed the importance of mobility and regular movement to prevent blood clots.  - Patient to  continue regular walking exercises, progressing from indoor to outdoor activities as tolerated.    ANTICOAGULATION MANAGEMENT:  - Educated on the risks of falling while on anticoagulation therapy, especially given patient's history of subdural hematoma.  - Patient to maintain vigilance to prevent falls.  - Continued Eliquis for anticoagulation.  - Continued aspirin 81 mg daily in conjunction with Eliquis.  - Blood work ordered to check thyroid function and monitor response to anticoagulation therapy.    HYPOTHYROIDISM:  - Continued Synthroid 175 mcg daily.  - Decreased: Skip Sunday dose to address potential over-replacement.    HYPERTENSION:  - Continued divanHCTZ for blood pressure management.    HYPERLIPIDEMIA:  - Continued rosuvastatin for cholesterol management.    FOLLOW-UP:  - Follow up in 4-6 weeks.  - Additional blood test to be conducted at next follow-up visit.

## 2024-12-19 ENCOUNTER — LAB VISIT (OUTPATIENT)
Dept: LAB | Facility: HOSPITAL | Age: 84
End: 2024-12-19
Attending: INTERNAL MEDICINE
Payer: MEDICARE

## 2024-12-19 DIAGNOSIS — D69.6 THROMBOCYTOPENIA: ICD-10-CM

## 2024-12-19 DIAGNOSIS — I10 PRIMARY HYPERTENSION: ICD-10-CM

## 2024-12-19 DIAGNOSIS — E03.9 HYPOTHYROIDISM: Primary | ICD-10-CM

## 2024-12-19 DIAGNOSIS — E78.5 DYSLIPIDEMIA: ICD-10-CM

## 2024-12-19 LAB
ANION GAP SERPL CALC-SCNC: 8 MMOL/L (ref 8–16)
BASOPHILS # BLD AUTO: 0.04 K/UL (ref 0–0.2)
BASOPHILS NFR BLD: 0.6 % (ref 0–1.9)
BUN SERPL-MCNC: 24 MG/DL (ref 8–23)
CALCIUM SERPL-MCNC: 9.2 MG/DL (ref 8.7–10.5)
CHLORIDE SERPL-SCNC: 109 MMOL/L (ref 95–110)
CHOLEST SERPL-MCNC: 145 MG/DL (ref 120–199)
CHOLEST/HDLC SERPL: 2.9 {RATIO} (ref 2–5)
CO2 SERPL-SCNC: 26 MMOL/L (ref 23–29)
CREAT SERPL-MCNC: 0.9 MG/DL (ref 0.5–1.4)
DIFFERENTIAL METHOD BLD: ABNORMAL
EOSINOPHIL # BLD AUTO: 0.1 K/UL (ref 0–0.5)
EOSINOPHIL NFR BLD: 1.9 % (ref 0–8)
ERYTHROCYTE [DISTWIDTH] IN BLOOD BY AUTOMATED COUNT: 14.1 % (ref 11.5–14.5)
EST. GFR  (NO RACE VARIABLE): >60 ML/MIN/1.73 M^2
GLUCOSE SERPL-MCNC: 90 MG/DL (ref 70–110)
HCT VFR BLD AUTO: 38.3 % (ref 37–48.5)
HDLC SERPL-MCNC: 50 MG/DL (ref 40–75)
HDLC SERPL: 34.5 % (ref 20–50)
HGB BLD-MCNC: 12.1 G/DL (ref 12–16)
IMM GRANULOCYTES # BLD AUTO: 0.03 K/UL (ref 0–0.04)
IMM GRANULOCYTES NFR BLD AUTO: 0.4 % (ref 0–0.5)
LDLC SERPL CALC-MCNC: 75.4 MG/DL (ref 63–159)
LYMPHOCYTES # BLD AUTO: 1.1 K/UL (ref 1–4.8)
LYMPHOCYTES NFR BLD: 16 % (ref 18–48)
MCH RBC QN AUTO: 30.8 PG (ref 27–31)
MCHC RBC AUTO-ENTMCNC: 31.6 G/DL (ref 32–36)
MCV RBC AUTO: 98 FL (ref 82–98)
MONOCYTES # BLD AUTO: 0.5 K/UL (ref 0.3–1)
MONOCYTES NFR BLD: 7.1 % (ref 4–15)
NEUTROPHILS # BLD AUTO: 5.1 K/UL (ref 1.8–7.7)
NEUTROPHILS NFR BLD: 74 % (ref 38–73)
NONHDLC SERPL-MCNC: 95 MG/DL
NRBC BLD-RTO: 0 /100 WBC
PLATELET # BLD AUTO: 167 K/UL (ref 150–450)
PMV BLD AUTO: 10.5 FL (ref 9.2–12.9)
POTASSIUM SERPL-SCNC: 4.6 MMOL/L (ref 3.5–5.1)
RBC # BLD AUTO: 3.93 M/UL (ref 4–5.4)
SODIUM SERPL-SCNC: 143 MMOL/L (ref 136–145)
TRIGL SERPL-MCNC: 98 MG/DL (ref 30–150)
TSH SERPL DL<=0.005 MIU/L-ACNC: 1.97 UIU/ML (ref 0.4–4)
WBC # BLD AUTO: 6.89 K/UL (ref 3.9–12.7)

## 2024-12-19 PROCEDURE — 80061 LIPID PANEL: CPT | Performed by: FAMILY MEDICINE

## 2024-12-19 PROCEDURE — 80048 BASIC METABOLIC PNL TOTAL CA: CPT | Performed by: FAMILY MEDICINE

## 2024-12-19 PROCEDURE — 36415 COLL VENOUS BLD VENIPUNCTURE: CPT | Performed by: INTERNAL MEDICINE

## 2024-12-19 PROCEDURE — 85025 COMPLETE CBC W/AUTO DIFF WBC: CPT | Performed by: FAMILY MEDICINE

## 2024-12-19 PROCEDURE — 84443 ASSAY THYROID STIM HORMONE: CPT | Performed by: INTERNAL MEDICINE

## 2024-12-23 ENCOUNTER — HOSPITAL ENCOUNTER (EMERGENCY)
Facility: HOSPITAL | Age: 84
Discharge: HOME | End: 2024-12-23
Payer: MEDICARE

## 2024-12-23 VITALS
SYSTOLIC BLOOD PRESSURE: 168 MMHG | OXYGEN SATURATION: 96 % | DIASTOLIC BLOOD PRESSURE: 79 MMHG | BODY MASS INDEX: 27.22 KG/M2 | TEMPERATURE: 97 F | RESPIRATION RATE: 20 BRPM | WEIGHT: 179 LBS | HEART RATE: 70 BPM

## 2024-12-23 DIAGNOSIS — G44.209 TENSION HEADACHE: Primary | ICD-10-CM

## 2024-12-23 LAB
ALBUMIN SERPL BCP-MCNC: 3.9 G/DL (ref 3.5–5.2)
ALP SERPL-CCNC: 68 U/L (ref 40–150)
ALT SERPL W/O P-5'-P-CCNC: 17 U/L (ref 10–44)
ANION GAP SERPL CALC-SCNC: 10 MMOL/L (ref 8–16)
AST SERPL-CCNC: 22 U/L (ref 10–40)
BASOPHILS # BLD AUTO: 0.04 K/UL (ref 0–0.2)
BASOPHILS NFR BLD: 0.6 % (ref 0–1.9)
BILIRUB SERPL-MCNC: 0.6 MG/DL (ref 0.1–1)
BUN SERPL-MCNC: 21 MG/DL (ref 8–23)
CALCIUM SERPL-MCNC: 9.4 MG/DL (ref 8.7–10.5)
CHLORIDE SERPL-SCNC: 108 MMOL/L (ref 95–110)
CO2 SERPL-SCNC: 24 MMOL/L (ref 23–29)
CREAT SERPL-MCNC: 0.9 MG/DL (ref 0.5–1.4)
DIFFERENTIAL METHOD BLD: ABNORMAL
EOSINOPHIL # BLD AUTO: 0 K/UL (ref 0–0.5)
EOSINOPHIL NFR BLD: 0.6 % (ref 0–8)
ERYTHROCYTE [DISTWIDTH] IN BLOOD BY AUTOMATED COUNT: 13.7 % (ref 11.5–14.5)
EST. GFR  (NO RACE VARIABLE): >60 ML/MIN/1.73 M^2
GLUCOSE SERPL-MCNC: 124 MG/DL (ref 70–110)
HCT VFR BLD AUTO: 39 % (ref 37–48.5)
HGB BLD-MCNC: 12.5 G/DL (ref 12–16)
IMM GRANULOCYTES # BLD AUTO: 0.01 K/UL (ref 0–0.04)
IMM GRANULOCYTES NFR BLD AUTO: 0.1 % (ref 0–0.5)
LYMPHOCYTES # BLD AUTO: 0.9 K/UL (ref 1–4.8)
LYMPHOCYTES NFR BLD: 12 % (ref 18–48)
MCH RBC QN AUTO: 30.9 PG (ref 27–31)
MCHC RBC AUTO-ENTMCNC: 32.1 G/DL (ref 32–36)
MCV RBC AUTO: 97 FL (ref 82–98)
MONOCYTES # BLD AUTO: 0.1 K/UL (ref 0.3–1)
MONOCYTES NFR BLD: 1.9 % (ref 4–15)
NEUTROPHILS # BLD AUTO: 6.2 K/UL (ref 1.8–7.7)
NEUTROPHILS NFR BLD: 84.8 % (ref 38–73)
NRBC BLD-RTO: 0 /100 WBC
PLATELET # BLD AUTO: 183 K/UL (ref 150–450)
PMV BLD AUTO: 10.8 FL (ref 9.2–12.9)
POTASSIUM SERPL-SCNC: 4.5 MMOL/L (ref 3.5–5.1)
PROT SERPL-MCNC: 7.5 G/DL (ref 6–8.4)
RBC # BLD AUTO: 4.04 M/UL (ref 4–5.4)
SODIUM SERPL-SCNC: 142 MMOL/L (ref 136–145)
WBC # BLD AUTO: 7.26 K/UL (ref 3.9–12.7)

## 2024-12-23 PROCEDURE — 25500020 PHARM REV CODE 255

## 2024-12-23 PROCEDURE — 99285 EMERGENCY DEPT VISIT HI MDM: CPT | Mod: 25

## 2024-12-23 PROCEDURE — 96375 TX/PRO/DX INJ NEW DRUG ADDON: CPT

## 2024-12-23 PROCEDURE — 85025 COMPLETE CBC W/AUTO DIFF WBC: CPT | Performed by: NURSE PRACTITIONER

## 2024-12-23 PROCEDURE — 25000003 PHARM REV CODE 250: Performed by: NURSE PRACTITIONER

## 2024-12-23 PROCEDURE — 96365 THER/PROPH/DIAG IV INF INIT: CPT

## 2024-12-23 PROCEDURE — 63600175 PHARM REV CODE 636 W HCPCS

## 2024-12-23 PROCEDURE — 80053 COMPREHEN METABOLIC PANEL: CPT | Performed by: NURSE PRACTITIONER

## 2024-12-23 PROCEDURE — 63600175 PHARM REV CODE 636 W HCPCS: Performed by: NURSE PRACTITIONER

## 2024-12-23 RX ORDER — DIPHENHYDRAMINE HYDROCHLORIDE 50 MG/ML
25 INJECTION INTRAMUSCULAR; INTRAVENOUS
Status: COMPLETED | OUTPATIENT
Start: 2024-12-23 | End: 2024-12-23

## 2024-12-23 RX ORDER — MAGNESIUM SULFATE HEPTAHYDRATE 40 MG/ML
2 INJECTION, SOLUTION INTRAVENOUS
Status: COMPLETED | OUTPATIENT
Start: 2024-12-23 | End: 2024-12-23

## 2024-12-23 RX ORDER — DEXAMETHASONE SODIUM PHOSPHATE 4 MG/ML
8 INJECTION, SOLUTION INTRA-ARTICULAR; INTRALESIONAL; INTRAMUSCULAR; INTRAVENOUS; SOFT TISSUE
Status: COMPLETED | OUTPATIENT
Start: 2024-12-23 | End: 2024-12-23

## 2024-12-23 RX ORDER — BUTALBITAL, ACETAMINOPHEN AND CAFFEINE 50; 325; 40 MG/1; MG/1; MG/1
1 TABLET ORAL EVERY 4 HOURS PRN
Qty: 15 TABLET | Refills: 0 | Status: SHIPPED | OUTPATIENT
Start: 2024-12-23 | End: 2025-01-22

## 2024-12-23 RX ORDER — SODIUM CHLORIDE 9 MG/ML
500 INJECTION, SOLUTION INTRAVENOUS
Status: COMPLETED | OUTPATIENT
Start: 2024-12-23 | End: 2024-12-23

## 2024-12-23 RX ORDER — PROCHLORPERAZINE EDISYLATE 5 MG/ML
10 INJECTION INTRAMUSCULAR; INTRAVENOUS
Status: COMPLETED | OUTPATIENT
Start: 2024-12-23 | End: 2024-12-23

## 2024-12-23 RX ORDER — KETOROLAC TROMETHAMINE 30 MG/ML
15 INJECTION, SOLUTION INTRAMUSCULAR; INTRAVENOUS
Status: COMPLETED | OUTPATIENT
Start: 2024-12-23 | End: 2024-12-23

## 2024-12-23 RX ADMIN — SODIUM CHLORIDE 500 ML: 0.9 INJECTION, SOLUTION INTRAVENOUS at 01:12

## 2024-12-23 RX ADMIN — MAGNESIUM SULFATE HEPTAHYDRATE 2 G: 40 INJECTION, SOLUTION INTRAVENOUS at 09:12

## 2024-12-23 RX ADMIN — DIPHENHYDRAMINE HYDROCHLORIDE 25 MG: 50 INJECTION INTRAMUSCULAR; INTRAVENOUS at 09:12

## 2024-12-23 RX ADMIN — PROCHLORPERAZINE EDISYLATE 10 MG: 5 INJECTION INTRAMUSCULAR; INTRAVENOUS at 09:12

## 2024-12-23 RX ADMIN — DEXAMETHASONE SODIUM PHOSPHATE 8 MG: 4 INJECTION, SOLUTION INTRA-ARTICULAR; INTRALESIONAL; INTRAMUSCULAR; INTRAVENOUS; SOFT TISSUE at 09:12

## 2024-12-23 RX ADMIN — IOHEXOL 75 ML: 350 INJECTION, SOLUTION INTRAVENOUS at 02:12

## 2024-12-23 RX ADMIN — KETOROLAC TROMETHAMINE 15 MG: 30 INJECTION, SOLUTION INTRAMUSCULAR; INTRAVENOUS at 11:12

## 2024-12-23 NOTE — ED PROVIDER NOTES
Encounter Date: 12/23/2024       History     Chief Complaint   Patient presents with    Headache     Patient to ER CC of a headache to the back of her head that started yesterday and has not eased up since, she denies any trauma, denies any vision changes, denies weakness       Giselle Rios is a 84 y.o. female with PMH of hypothyroidism, obesity, DVT on Eliquis presenting to the ED for evaluation of headache.  Patient reports that she woke up in the middle of the night with a throbbing pain posterior head.  Pain has been constant despite taking Tylenol at home.  She currently rates pain 10/10 in severity.  She denies prior history of migraine headaches.  Denies any injuries or falls.  Denies visual changes, facial tingling, or numbness.    The history is provided by the patient.     Review of patient's allergies indicates:  No Known Allergies  Past Medical History:   Diagnosis Date    Hypothyroidism     Obesity      Past Surgical History:   Procedure Laterality Date    APPENDECTOMY      cervical fusion x 2      HYSTERECTOMY       Family History   Problem Relation Name Age of Onset    Thyroid nodules Father      Diabetes Sister      Nephrolithiasis Daughter      Breast cancer Daughter      Thyroid disease Paternal Uncle      Diabetes Maternal Grandmother       Social History     Tobacco Use    Smoking status: Never    Smokeless tobacco: Never   Substance Use Topics    Alcohol use: No    Drug use: No     Review of Systems   Constitutional:  Negative for activity change, chills and fever.   HENT:  Negative for congestion, ear discharge, ear pain, postnasal drip, sinus pressure, sinus pain and sore throat.    Respiratory:  Negative for cough, chest tightness and shortness of breath.    Cardiovascular:  Negative for chest pain.   Gastrointestinal:  Negative for abdominal distention, abdominal pain and nausea.   Genitourinary:  Negative for dysuria, frequency and urgency.   Musculoskeletal:  Negative for back pain.    Skin: Negative.  Negative for rash.   Neurological:  Positive for headaches. Negative for dizziness, weakness, light-headedness and numbness.   Hematological:  Does not bruise/bleed easily.       Physical Exam     Initial Vitals   BP Pulse Resp Temp SpO2   12/23/24 0906 12/23/24 0906 12/23/24 0906 12/23/24 0910 12/23/24 0906   139/68 72 20 97 °F (36.1 °C) 98 %      MAP       --                Physical Exam    Nursing note and vitals reviewed.  Constitutional: She appears well-developed and well-nourished.   HENT:   Head: Normocephalic and atraumatic.   Eyes: Conjunctivae and EOM are normal. Pupils are equal, round, and reactive to light.   Neck: Neck supple.   Cardiovascular:  Normal rate, regular rhythm, normal heart sounds and intact distal pulses.           Pulmonary/Chest: Breath sounds normal.   Abdominal: Abdomen is soft. Bowel sounds are normal.   Musculoskeletal:         General: Normal range of motion.      Cervical back: Neck supple.     Neurological: She is alert and oriented to person, place, and time. She has normal strength. No cranial nerve deficit or sensory deficit. GCS eye subscore is 4. GCS verbal subscore is 5. GCS motor subscore is 6.   Alert. Though processes coherent. AAO X 3. CN's intact. Good muscle bulk and tone. Strength 5/5 throughout. Cerebellar: F-N intact, H-S intact. Gait normal. Romberg: maintains balance with eyes closed. No pronator drift.     Skin: Skin is warm and dry.   Psychiatric: She has a normal mood and affect. Her behavior is normal. Judgment and thought content normal.         ED Course   Procedures  Labs Reviewed   CBC W/ AUTO DIFFERENTIAL - Abnormal       Result Value    WBC 7.26      RBC 4.04      Hemoglobin 12.5      Hematocrit 39.0      MCV 97      MCH 30.9      MCHC 32.1      RDW 13.7      Platelets 183      MPV 10.8      Immature Granulocytes 0.1      Gran # (ANC) 6.2      Immature Grans (Abs) 0.01      Lymph # 0.9 (*)     Mono # 0.1 (*)     Eos # 0.0      Baso #  0.04      nRBC 0      Gran % 84.8 (*)     Lymph % 12.0 (*)     Mono % 1.9 (*)     Eosinophil % 0.6      Basophil % 0.6      Differential Method Automated     COMPREHENSIVE METABOLIC PANEL - Abnormal    Sodium 142      Potassium 4.5      Chloride 108      CO2 24      Glucose 124 (*)     BUN 21      Creatinine 0.9      Calcium 9.4      Total Protein 7.5      Albumin 3.9      Total Bilirubin 0.6      Alkaline Phosphatase 68      AST 22      ALT 17      eGFR >60      Anion Gap 10            Imaging Results              CTA Head and Neck (xpd) (Final result)  Result time 12/23/24 14:27:55      Final result by Donna Virgen MD (12/23/24 14:27:55)                   Impression:      Age-appropriate generalized cerebral volume loss with moderate chronic microvascular ischemic change.  No evidence for acute intracranial hemorrhage or new parenchymal hypoattenuation to suggest an acute infarction.  No abnormal parenchymal enhancement.  Please note that MRI is more sensitive to the detection of acute ischemia.    Calcified atheromatous disease involving the bilateral carotid bulbs with soft plaque in the left carotid bulb resulting in approximately 30% luminal narrowing.    No focal stenosis, occlusion or aneurysm involving the [place].      Electronically signed by: Donna Virgen MD  Date:    12/23/2024  Time:    14:27               Narrative:    EXAMINATION:  CTA HEAD AND NECK (XPD)    CLINICAL HISTORY:  headache; worse headache with no prior hx;    TECHNIQUE:  Axial CT images obtained throughout the region of the head after the administration of intravenous contrast.  CT angiogram was performed from through the cervical and intracranial vasculature during the IV bolus administration of 75mL of Omnipaque 350.  Multiplanar MPR and MIP reformats were performed.    COMPARISON:  12/23/2024    FINDINGS:  Age-appropriate generalized cerebral volume loss with moderate chronic microvascular ischemic change.  No  abnormal enhancement.  Ventricles stable in size and configuration without hydrocephalus.  No extra-axial fluid collections.  No mass effect or midline shift.  The visualized paranasal sinuses including the mastoid air cells are clear.    Mosaic perfusion pattern of the lung apices which can be seen the setting of small airways versus small vessels disease.  The aero digestive tract is patent without focal asymmetries.  The bilateral parotid glands, submandibular glands have a normal appearance.  The thyroid is atrophic.  Reversal of the normal cervical lordosis.  Suspected postoperative changes at the C4 through C6 level with osseous fusion of the vertebral bodies.      CTA:    The aortic arch maintains a normal branching pattern.  Calcifications of the aortic arch and its branch vessels.    The common and internal carotid arteries are normal in course and caliber.  30-40% narrowing of the left carotid bulb..  Calcifications of the bilateral carotid bulbs.  Prominent soft plaque in the left carotid bulb    The vertebral origins are patent. The cervical vertebral arteries are normal in course and caliber. Vertebrobasilar system is within normal limits without focal abnormality.    The anterior, middle, and posterior cerebral arteries are within normal limits, without evidence of significant stenosis, focal occlusion, or intracranial aneurysm formation.                                       CT Head Without Contrast (Final result)  Result time 12/23/24 09:42:37      Final result by Donna Virgen MD (12/23/24 09:42:37)                   Impression:      No acute intracranial findings.    Age-appropriate cerebral volume loss with moderate patchy decreased attenuation supratentorial white matter while nonspecific suggestive for chronic ischemic change.    No evidence for acute intracranial hemorrhage.  Clinical correlation and further evaluation as warranted.      Electronically signed by: Donna Virgen  MD  Date:    12/23/2024  Time:    09:42               Narrative:    EXAMINATION:  CT HEAD WITHOUT CONTRAST    CLINICAL HISTORY:  Headache, new or worsening (Age >= 50y);    TECHNIQUE:  Multiple sequential 5 mm axial images of the head without contrast.  Coronal and sagittal reformatted imaging from the axial acquisition.    COMPARISON:  01/11/2020    FINDINGS:  There is mild age-appropriate generalized cerebral volume loss.  Compensatory enlargement of the ventricle sulci and cisterns without hydrocephalus.  There is no midline shift or mass effect.  There is moderate ill-defined decreased attenuation in the supratentorial white matter while nonspecific suggestive for chronic ischemic change.  There is no evidence for acute intracranial hemorrhage or sulcal effacement.  There is no midline shift or mass effect.  Prominent vascular calcifications.  Visualized paranasal sinuses and mastoid air cells are clear..                                       Medications   dexAMETHasone injection 8 mg (8 mg Intravenous Given 12/23/24 0947)   diphenhydrAMINE injection 25 mg (25 mg Intravenous Given 12/23/24 0946)   magnesium sulfate 2g in water 50mL IVPB (premix) (0 g Intravenous Stopped 12/23/24 1141)   prochlorperazine injection Soln 10 mg (10 mg Intravenous Given 12/23/24 0947)   ketorolac injection 15 mg (15 mg Intravenous Given 12/23/24 1138)   0.9% NaCl infusion (500 mLs Intravenous New Bag 12/23/24 1315)   iohexoL (OMNIPAQUE 350) injection 75 mL (75 mLs Intravenous Given 12/23/24 1408)     Medical Decision Making  Evaluation of an 84-year-old female with acute occipital headache.  Patient with no prior history of migraine headaches.  She presents nontoxic appearing with stable vital signs.  No focal neurological deficits.  Differential diagnosis includes tension headache, occipital neuralgia, migraine headache, SAH, cervical radiculopathy    Problems Addressed:  Tension headache: acute illness or injury    Amount and/or  Complexity of Data Reviewed  Labs: ordered. Decision-making details documented in ED Course.  Radiology: ordered. Decision-making details documented in ED Course.    Risk  Prescription drug management.  Risk Details: Stable for discharge home.  Patient presented with occipital headache with no prior history of headaches.  Negative head CT.  She was given IV migraine cocktail without significant improvement; headache from 10/10 to 7/10. CTA head and neck obtained to r/o aneurysm vs spontaneous SAH that shows no acute findings. She did get relief with IV Toradol. Will dc home with close OP follow-up with PCP. Patient/caregiver voices understanding and feels comfortable with discharge plan.      The patient acknowledges that close follow up with medical provider is required. Instructed to follow up with PCP within 2 days. Patient was given specific return precautions. The patient agrees to comply with all instruction and directions given in the ER.                                        Clinical Impression:  Final diagnoses:  [G44.209] Tension headache (Primary)          ED Disposition Condition    Discharge Stable          ED Prescriptions       Medication Sig Dispense Start Date End Date Auth. Provider    butalbital-acetaminophen-caffeine -40 mg (FIORICET, ESGIC) -40 mg per tablet Take 1 tablet by mouth every 4 (four) hours as needed for Headaches. Max of 6 tablets per day. 15 tablet 12/23/2024 1/22/2025 Maris Hewitt NP          Follow-up Information       Follow up With Specialties Details Why Contact Info    Danny Arellano MD Family Medicine Schedule an appointment as soon as possible for a visit in 2 days  [address] [address]  [phone]               Maris Hewitt NP  12/23/24 6599

## 2024-12-23 NOTE — ED PROVIDER NOTES
Encounter Date: 12/23/2024    Source of History:   Patient and medical record, without language barrier or      Chief complaint:  Headache (Patient to ER CC of a headache to the back of her head that started yesterday and has not eased up since, she denies any trauma, denies any vision changes, denies weakness  )    HPI:  Giselle Rios is a 84 y.o. female with *** presenting with chief complaint of ***    This is the extent to the patients complaints today here in the emergency department.    ROS: A review of systems was conducted with pertinent positive and negative findings documented in HPI with all other systems reviewed and negative.  ROS    Review of patient's allergies indicates:  No Known Allergies    PMH:  As per HPI and below:  Past Medical History:   Diagnosis Date    Hypothyroidism     Obesity      Past Surgical History:   Procedure Laterality Date    APPENDECTOMY      cervical fusion x 2      HYSTERECTOMY       Social History     Socioeconomic History    Marital status: Single   Tobacco Use    Smoking status: Never    Smokeless tobacco: Never   Substance and Sexual Activity    Alcohol use: No    Drug use: No    Sexual activity: Never   Social History Narrative    works at the furniture store. Also has a handicapped daughter     Social Drivers of Health     Financial Resource Strain: Low Risk  (12/12/2024)    Overall Financial Resource Strain (CARDIA)     Difficulty of Paying Living Expenses: Not hard at all   Food Insecurity: No Food Insecurity (12/12/2024)    Hunger Vital Sign     Worried About Running Out of Food in the Last Year: Never true     Ran Out of Food in the Last Year: Never true   Transportation Needs: No Transportation Needs (12/12/2024)    TRANSPORTATION NEEDS     Transportation : No   Stress: No Stress Concern Present (12/12/2024)    [employer] - Occupational Stress Questionnaire     Feeling of Stress : Not at all   Housing Stability: Low Risk   (12/12/2024)    Housing Stability Vital Sign     Unable to Pay for Housing in the Last Year: No     Homeless in the Last Year: No     Vitals:    /68   Pulse 72   Temp 97 °F (36.1 °C)   Resp 20   Wt 81.2 kg (179 lb)   SpO2 98%   BMI 27.22 kg/m²     Physical Exam  Procedures    Laboratory Studies:  Labs that have been ordered have been independently reviewed and interpreted by myself.  Labs Reviewed - No data to display  Imaging Results    None         EKG (independently interpreted by me): Rhythm: ***, rate of  *** BPM, no ST elevations or depressions, QTc ***    Imaging (independently interpreted by me): ***    I decided to obtain the patient's medical records.  Summary of Medical Records:    Medications - No data to display  MDM:    84 y.o. female with ***    Differential Dx:  Differential includes but is not limited to ***    ED Management:  *** workup started for acute presentation of an emergent condition. *** for symptomatic treatment. ***    Discussed with: ***  Social Determinates of Health: ***    Medical Decision Making          Diagnostic Impression:    Final diagnoses:  None

## 2024-12-30 ENCOUNTER — TELEPHONE (OUTPATIENT)
Dept: NEUROLOGY | Facility: CLINIC | Age: 84
End: 2024-12-30
Payer: MEDICARE

## 2024-12-30 NOTE — TELEPHONE ENCOUNTER
----- Message from Rowena sent at 2024  8:46 AM CST -----  Contact: DAUGHTER - KATIE Rios  MRN: [id number]  : 1940  PCP: Danny Arellano  Home Phone      Not on file.  Work Phone      Not on file.  Mobile          [phone]      MESSAGE: Daughter states that the patient was seen in the emergency room over the weekend for a severe headache.  The daughter was told that the patient needed to be seen by neurology but there is nothing available until February with any of the providers.          Phone: [phone]

## 2024-12-30 NOTE — TELEPHONE ENCOUNTER
Notified Heather of next available appointment on 2/11/2025 at 2pm with , offered to place appointment on the wait-list. Heather accepts appointment on 2/11/2025 at 2pm with  for the patient and desires appointment be placed on wait-list. Appointment scheduled and placed on the wait-list.

## 2025-01-03 DIAGNOSIS — I82.491 ACUTE DEEP VEIN THROMBOSIS (DVT) OF OTHER SPECIFIED VEIN OF RIGHT LOWER EXTREMITY: ICD-10-CM

## 2025-01-03 DIAGNOSIS — I26.99 ACUTE PULMONARY EMBOLISM WITHOUT ACUTE COR PULMONALE, UNSPECIFIED PULMONARY EMBOLISM TYPE: ICD-10-CM

## 2025-01-03 NOTE — TELEPHONE ENCOUNTER
----- Message from [facility] sent at 1/3/2025 10:15 AM CST -----  Contact: patient  Giselle Rios  MRN: [id number]  : 1940  PCP: Danny Arellano  Home Phone      Not on file.  Work Phone      Not on file.  Mobile          [phone]      MESSAGE:   Pt requesting refill or new Rx.   Is this a refill or new RX:  refill  RX name and strength: apixaban (ELIQUIS) 5 mg Tab   Last office visit: 2024  Is this a 30-day or 90-day RX:  60 day  Pharmacy name and location:  [facility], [address], [place] [id number] [facility] [address]   Comments:  patient does not have any refills on the bottle but is terrified to run out since it is her blood thinners. Only has enough to last the weekend will be out as of 2025    Phone:  [phone]

## 2025-01-03 NOTE — TELEPHONE ENCOUNTER
LOV:  12/18/2024  Pt requesting refill of:   apixaban (ELIQUIS) 5 mg Tab   Medication pended     Please advise

## 2025-01-06 ENCOUNTER — TELEPHONE (OUTPATIENT)
Dept: FAMILY MEDICINE | Facility: CLINIC | Age: 85
End: 2025-01-06
Payer: MEDICARE

## 2025-01-06 DIAGNOSIS — S06.5XAA SDH (SUBDURAL HEMATOMA): Primary | ICD-10-CM

## 2025-01-06 NOTE — TELEPHONE ENCOUNTER
----- Message from [place] sent at 2025 10:12 AM CST -----  Contact: Daughter - Heather Rios  MRN: [id number]  : 1940  PCP: Danny Arellano  Home Phone      Not on file.  Work Phone      Not on file.  Mobile          [phone]      MESSAGE: requesting referral to Neurosurgeon in [place] (Dr Gutierrez) -- has [employer] insurance -- appt pending referral     Call Heather @ [phone] or patient @ [phone]    PCP: Eileen

## 2025-01-06 NOTE — TELEPHONE ENCOUNTER
requesting referral to Neurosurgeon in [place] (Dr Gutierrez) -- has [employer] insurance -- appt pending referral   Please advise

## 2025-01-06 NOTE — TELEPHONE ENCOUNTER
----- Message from Mirian sent at 2025  9:18 AM CST -----  Contact: patient  Giselle Rios  MRN: [id number]  : 1940  PCP: Danny Arellano  Home Phone      Not on file.  Work Phone      Not on file.  Mobile          [phone]      MESSAGE: patient called went to go  apixaban (ELIQUIS) 5 mg Tab pharmacy told her it was over $300.00 which she cannot afford and would like to see if there is a cheaper alternative or a sample she can get.    Please advise: [phone]

## 2025-01-06 NOTE — TELEPHONE ENCOUNTER
Diagnoses and all orders for this visit:    SDH (subdural hematoma)  -     Ambulatory referral/consult to Neurosurgery; Future

## 2025-01-13 ENCOUNTER — TELEPHONE (OUTPATIENT)
Dept: FAMILY MEDICINE | Facility: CLINIC | Age: 85
End: 2025-01-13
Payer: MEDICARE

## 2025-01-13 NOTE — TELEPHONE ENCOUNTER
Dr chow's office called and said they've reviewed the ct and do not see a hematoma   Wondering if we put in the wrong referral and maybe pt needs neurology . Please advise

## 2025-01-13 NOTE — TELEPHONE ENCOUNTER
----- Message from [place] sent at 2025  2:08 PM CST -----  Contact: Deidra  Giselle Rios  MRN: [id number]  : 1940  PCP: Danny Arellano  Home Phone      Not on file.  Work Phone      Not on file.  Mobile          [phone]      MESSAGE:     Deidra [facility] called wanting to speak with nurse about a referall that was sent over to them. She wants to know if it was sent to the right place.        Please advise  Deidra  [phone]

## 2025-01-15 ENCOUNTER — LAB VISIT (OUTPATIENT)
Dept: LAB | Facility: HOSPITAL | Age: 85
End: 2025-01-15
Attending: NURSE PRACTITIONER
Payer: MEDICARE

## 2025-01-15 DIAGNOSIS — Z79.01 LONG TERM (CURRENT) USE OF ANTICOAGULANTS: ICD-10-CM

## 2025-01-15 DIAGNOSIS — Z86.711 PERSONAL HISTORY OF PE (PULMONARY EMBOLISM): ICD-10-CM

## 2025-01-15 DIAGNOSIS — Z79.1 ENCOUNTER FOR LONG-TERM (CURRENT) USE OF NSAIDS: ICD-10-CM

## 2025-01-15 DIAGNOSIS — M54.9 BACK PAIN: ICD-10-CM

## 2025-01-15 DIAGNOSIS — D64.9 ANEMIA: ICD-10-CM

## 2025-01-15 DIAGNOSIS — R10.13 ABDOMINAL PAIN, EPIGASTRIC: Primary | ICD-10-CM

## 2025-01-15 LAB
ALBUMIN SERPL BCP-MCNC: 3.6 G/DL (ref 3.5–5.2)
ALP SERPL-CCNC: 63 U/L (ref 40–150)
ALT SERPL W/O P-5'-P-CCNC: 15 U/L (ref 10–44)
AST SERPL-CCNC: 18 U/L (ref 10–40)
BASOPHILS # BLD AUTO: 0.04 K/UL (ref 0–0.2)
BASOPHILS NFR BLD: 0.7 % (ref 0–1.9)
BILIRUB DIRECT SERPL-MCNC: 0.2 MG/DL (ref 0.1–0.3)
BILIRUB SERPL-MCNC: 0.4 MG/DL (ref 0.1–1)
DIFFERENTIAL METHOD BLD: ABNORMAL
EOSINOPHIL # BLD AUTO: 0.1 K/UL (ref 0–0.5)
EOSINOPHIL NFR BLD: 2 % (ref 0–8)
ERYTHROCYTE [DISTWIDTH] IN BLOOD BY AUTOMATED COUNT: 13.9 % (ref 11.5–14.5)
FERRITIN SERPL-MCNC: 49 NG/ML (ref 20–300)
HCT VFR BLD AUTO: 40.5 % (ref 37–48.5)
HGB BLD-MCNC: 12.7 G/DL (ref 12–16)
IMM GRANULOCYTES # BLD AUTO: 0.01 K/UL (ref 0–0.04)
IMM GRANULOCYTES NFR BLD AUTO: 0.2 % (ref 0–0.5)
IRON SERPL-MCNC: 70 UG/DL (ref 30–160)
LIPASE SERPL-CCNC: 37 U/L (ref 4–60)
LYMPHOCYTES # BLD AUTO: 1.5 K/UL (ref 1–4.8)
LYMPHOCYTES NFR BLD: 25.4 % (ref 18–48)
MCH RBC QN AUTO: 30.8 PG (ref 27–31)
MCHC RBC AUTO-ENTMCNC: 31.4 G/DL (ref 32–36)
MCV RBC AUTO: 98 FL (ref 82–98)
MONOCYTES # BLD AUTO: 0.5 K/UL (ref 0.3–1)
MONOCYTES NFR BLD: 8.9 % (ref 4–15)
NEUTROPHILS # BLD AUTO: 3.7 K/UL (ref 1.8–7.7)
NEUTROPHILS NFR BLD: 62.8 % (ref 38–73)
NRBC BLD-RTO: 0 /100 WBC
PLATELET # BLD AUTO: 168 K/UL (ref 150–450)
PMV BLD AUTO: 11.2 FL (ref 9.2–12.9)
PROT SERPL-MCNC: 7.1 G/DL (ref 6–8.4)
RBC # BLD AUTO: 4.12 M/UL (ref 4–5.4)
SATURATED IRON: 21 % (ref 20–50)
TOTAL IRON BINDING CAPACITY: 332 UG/DL (ref 250–450)
TRANSFERRIN SERPL-MCNC: 224 MG/DL (ref 200–375)
WBC # BLD AUTO: 5.87 K/UL (ref 3.9–12.7)

## 2025-01-15 PROCEDURE — 80076 HEPATIC FUNCTION PANEL: CPT | Performed by: NURSE PRACTITIONER

## 2025-01-15 PROCEDURE — 83690 ASSAY OF LIPASE: CPT | Performed by: NURSE PRACTITIONER

## 2025-01-15 PROCEDURE — 83540 ASSAY OF IRON: CPT | Performed by: NURSE PRACTITIONER

## 2025-01-15 PROCEDURE — 82728 ASSAY OF FERRITIN: CPT | Performed by: NURSE PRACTITIONER

## 2025-01-15 PROCEDURE — 36415 COLL VENOUS BLD VENIPUNCTURE: CPT | Performed by: NURSE PRACTITIONER

## 2025-01-15 PROCEDURE — 85025 COMPLETE CBC W/AUTO DIFF WBC: CPT | Performed by: NURSE PRACTITIONER

## 2025-01-29 ENCOUNTER — OFFICE VISIT (OUTPATIENT)
Dept: INTERNAL MEDICINE | Facility: CLINIC | Age: 85
End: 2025-01-29
Payer: MEDICARE

## 2025-01-29 VITALS
DIASTOLIC BLOOD PRESSURE: 70 MMHG | RESPIRATION RATE: 18 BRPM | WEIGHT: 177.5 LBS | HEART RATE: 84 BPM | HEIGHT: 68 IN | SYSTOLIC BLOOD PRESSURE: 114 MMHG | BODY MASS INDEX: 26.9 KG/M2

## 2025-01-29 DIAGNOSIS — I26.99 ACUTE PULMONARY EMBOLISM WITHOUT ACUTE COR PULMONALE, UNSPECIFIED PULMONARY EMBOLISM TYPE: ICD-10-CM

## 2025-01-29 DIAGNOSIS — I10 PRIMARY HYPERTENSION: ICD-10-CM

## 2025-01-29 DIAGNOSIS — I82.491 ACUTE DEEP VEIN THROMBOSIS (DVT) OF OTHER SPECIFIED VEIN OF RIGHT LOWER EXTREMITY: ICD-10-CM

## 2025-01-29 DIAGNOSIS — E78.5 DYSLIPIDEMIA: ICD-10-CM

## 2025-01-29 DIAGNOSIS — E03.9 HYPOTHYROIDISM, UNSPECIFIED TYPE: Primary | ICD-10-CM

## 2025-01-29 PROCEDURE — 3288F FALL RISK ASSESSMENT DOCD: CPT | Mod: CPTII,S$GLB,, | Performed by: FAMILY MEDICINE

## 2025-01-29 PROCEDURE — 1125F AMNT PAIN NOTED PAIN PRSNT: CPT | Mod: CPTII,S$GLB,, | Performed by: FAMILY MEDICINE

## 2025-01-29 PROCEDURE — 99999 PR PBB SHADOW E&M-EST. PATIENT-LVL III: CPT | Mod: PBBFAC,,, | Performed by: FAMILY MEDICINE

## 2025-01-29 PROCEDURE — 3074F SYST BP LT 130 MM HG: CPT | Mod: CPTII,S$GLB,, | Performed by: FAMILY MEDICINE

## 2025-01-29 PROCEDURE — 3078F DIAST BP <80 MM HG: CPT | Mod: CPTII,S$GLB,, | Performed by: FAMILY MEDICINE

## 2025-01-29 PROCEDURE — 1159F MED LIST DOCD IN RCRD: CPT | Mod: CPTII,S$GLB,, | Performed by: FAMILY MEDICINE

## 2025-01-29 PROCEDURE — 99214 OFFICE O/P EST MOD 30 MIN: CPT | Mod: S$GLB,,, | Performed by: FAMILY MEDICINE

## 2025-01-29 PROCEDURE — 1101F PT FALLS ASSESS-DOCD LE1/YR: CPT | Mod: CPTII,S$GLB,, | Performed by: FAMILY MEDICINE

## 2025-01-29 PROCEDURE — 1160F RVW MEDS BY RX/DR IN RCRD: CPT | Mod: CPTII,S$GLB,, | Performed by: FAMILY MEDICINE

## 2025-01-29 RX ORDER — ROSUVASTATIN CALCIUM 20 MG/1
20 TABLET, COATED ORAL DAILY
Qty: 90 TABLET | Refills: 1 | Status: SHIPPED | OUTPATIENT
Start: 2025-01-29

## 2025-01-29 RX ORDER — VALSARTAN AND HYDROCHLOROTHIAZIDE 160; 12.5 MG/1; MG/1
1 TABLET, FILM COATED ORAL DAILY
Qty: 90 TABLET | Refills: 1 | Status: SHIPPED | OUTPATIENT
Start: 2025-01-29

## 2025-01-29 NOTE — PROGRESS NOTES
History of Present Illness    CHIEF COMPLAINT:  Patient presents today for follow up.    THROMBOSIS:  She started Eliquis twice daily on December 14th following hospitalization for pulmonary embolism and deep vein thrombosis. She acknowledges the necessity of continuing anticoagulation despite financial concerns.    HEADACHE:  She experienced an unusual episode of severe headache requiring emergency room evaluation. She reports rarely experiencing headaches. CT was negative for any abnormalities.    MUSCULOSKELETAL:  She reports severe knee pain. Recent cortisone injection was ineffective, though a previous injection one year ago provided approximately 12 months of relief.    DIVERTICULITIS:  She reports active diverticulitis symptoms and was directed by her specialist to follow up with primary care for medication management.    CARDIOLOGY:  She is followed by Dr. Nagy for cardiology care and is currently overdue for a checkup.    LABS:  Recent labs shows normal thyroid function and no evidence of anemia or bleeding.      ROS:  General: -fever, -chills, -fatigue, -weight gain, -weight loss  Eyes: -vision changes, -redness, -discharge  ENT: -ear pain, -nasal congestion, -sore throat  Cardiovascular: -chest pain, -palpitations, -lower extremity edema  Respiratory: -cough, -shortness of breath  Gastrointestinal: -abdominal pain, -nausea, -vomiting, -diarrhea, -constipation, -blood in stool, +change in bowel habits  Genitourinary: -dysuria, -hematuria, -frequency  Musculoskeletal: +joint pain, -muscle pain  Skin: -rash, -lesion  Neurological: +headache, -dizziness, -numbness, -tingling  Psychiatric: -anxiety, -depression, -sleep difficulty  Head: -head pain       Physical Exam    General: No acute distress. Well-developed. Well-nourished.  Eyes: EOMI. Sclerae anicteric.  HENT: Normocephalic. Atraumatic. Nares patent. Moist oral mucosa.  Ears: Bilateral TMs clear. Bilateral EACs clear.  Cardiovascular: Regular rate.  Regular rhythm. No murmurs. No rubs. No gallops. Normal S1, S2.  Respiratory: Normal respiratory effort. Clear to auscultation bilaterally. No rales. No rhonchi. No wheezing.  Abdomen: Soft. Non-tender. Non-distended. Normoactive bowel sounds.  Musculoskeletal: osteoarthritis right knee.  Antalgic gaiy  Extremities: No lower extremity edema.  Neurological: Alert & oriented x3. No slurred speech. Normal gait.  Psychiatric: Normal mood. Normal affect. Good insight. Good judgment.  Skin: Warm. Dry. No rash.   Lab Results   Component Value Date    WBC 5.87 01/15/2025    HGB 12.7 01/15/2025    HCT 40.5 01/15/2025    MCV 98 01/15/2025     01/15/2025       BMP  Lab Results   Component Value Date     12/23/2024    K 4.5 12/23/2024     12/23/2024    CO2 24 12/23/2024    BUN 21 12/23/2024    CREATININE 0.9 12/23/2024    CALCIUM 9.4 12/23/2024    ANIONGAP 10 12/23/2024    EGFRNORACEVR >60 12/23/2024     Lab Results   Component Value Date    LDLCALC 75.4 12/19/2024           Assessment & Plan    IMPRESSION:  - Continued Eliquis for blood clot prevention, initiated during December hospitalization for pulmonary embolism and leg clot  - Considered Coumadin as alternative if Eliquis unaffordable, but preferred to avoid due to required weekly blood monitoring  - Reviewed recent CT results, confirming no intracranial abnormalities following patient's severe headache episode  - Assessed recent bloodwork, noting no anemia or signs of bleeding while on Eliquis  - Evaluated diverticulitis symptoms, recommending conservative management with stool softeners  - Noted ineffective cortisone injection for knee pain, considering limited treatment options due to anticoagulation    CHRONIC COMBINED SYSTOLIC (CONGESTIVE) AND DIASTOLIC (CONGESTIVE) HEART FAILURE:  - Instructed the patient to keep appointment with Dr. Anderson (cardiologist).  - Monitored the patient's use of Eliquis since December 14th for blood clots and  pulmonary embolism, taken twice daily.  - Evaluated the patient's condition post-hospitalization, noting no signs of bleeding or anemia.  - Performed physical exam, revealing no edema.  - Reviewed labs from two weeks ago, which was satisfactory.  - Confirmed thyroid function is optimal and blood pressure this morning was within normal range.  - Acknowledged the necessity of anticoagulation therapy and discussed potential switch to Coumadin if Eliquis becomes cost-prohibitive.  - Sent prescription for Eliquis to [facility] to address affordability concerns.  - Advised the patient to take missed doses immediately upon remembering.  - Scheduled a follow-up visit in 3 months.    PULMONARY EMBOLISM:  - Emphasized the importance of maintaining consistent medication adherence for thrombosis prevention and discussed potential risks of discontinuation.    DEEP VEIN THROMBOSIS:  - Noted patient's history of deep vein thrombosis in December, including one in the leg.  - Anticoagulation therapy with Eliquis (apixaban) is being continued to address both pulmonary embolism and deep vein thrombosis.    MEDICATIONS/SUPPLEMENTS:  - Addressed affordability concerns by informing patient about medication samples available at [facility] if needed.    DIVERTICULITIS:  - Provided education on managing diverticulitis symptoms through proper bowel habits.  - Initiated Miralax daily to prevent constipation, which is crucial for diverticulitis management.  - Noted patient's reported problems with diverticulitis.    KNEE PAIN:  - Reviewed the efficacy of cortisone injection administered 2 weeks ago, which provided only 3 days of relief.  - Acknowledged that current anticoagulation therapy complicates knee treatment options.       1. Hypothyroidism, unspecified type  Overview:  Lab Results   Component Value Date    TSH 0.143 (L) 10/15/2024   Keep appt with Dr De Jesus  Continue Levothyroxine 175 mcg daily    Orders:  -     TSH; Future;  Expected date: 04/01/2025    2. Acute deep vein thrombosis (DVT) of other specified vein of right lower extremity  Overview:  Right venous thrombosis of femoral vein, popliteal and peroneal veins  Continue Eliquis    Orders:  -     apixaban (ELIQUIS) 5 mg Tab; Take 1 tablet (5 mg total) by mouth 2 (two) times daily.  Dispense: 60 tablet; Refill: 2    3. Acute pulmonary embolism without acute cor pulmonale, unspecified pulmonary embolism type  Overview:  Seen on CTA chest  IV heparin gtt completed.  Started Eloquis day of discharge.    Likely due to decreased mobility but will defer further workup to cardiology  Cardiology consulted   Patient chest pain and SOB resolved    Orders:  -     apixaban (ELIQUIS) 5 mg Tab; Take 1 tablet (5 mg total) by mouth 2 (two) times daily.  Dispense: 60 tablet; Refill: 2  -     CBC Auto Differential; Future; Expected date: 04/01/2025    4. Primary hypertension  -     valsartan-hydrochlorothiazide (DIOVAN-HCT) 160-12.5 mg per tablet; Take 1 tablet by mouth once daily.  Dispense: 90 tablet; Refill: 1  -     Comprehensive Metabolic Panel; Future; Expected date: 04/01/2025    5. Dyslipidemia  Overview:  Continue atorvastatin        Orders:  -     rosuvastatin (CRESTOR) 20 MG tablet; Take 1 tablet (20 mg total) by mouth once daily.  Dispense: 90 tablet; Refill: 1  -     Lipid Panel; Future; Expected date: 04/01/2025       RTC in 3 months

## 2025-02-11 ENCOUNTER — OFFICE VISIT (OUTPATIENT)
Dept: NEUROLOGY | Facility: CLINIC | Age: 85
End: 2025-02-11
Payer: MEDICARE

## 2025-02-11 VITALS
SYSTOLIC BLOOD PRESSURE: 132 MMHG | RESPIRATION RATE: 16 BRPM | HEIGHT: 68 IN | OXYGEN SATURATION: 92 % | BODY MASS INDEX: 27.43 KG/M2 | WEIGHT: 181 LBS | HEART RATE: 81 BPM | DIASTOLIC BLOOD PRESSURE: 80 MMHG

## 2025-02-11 DIAGNOSIS — M54.12 CERVICAL RADICULAR PAIN: Primary | ICD-10-CM

## 2025-02-11 DIAGNOSIS — M50.90 CERVICAL DISC DISEASE: ICD-10-CM

## 2025-02-11 PROCEDURE — 1159F MED LIST DOCD IN RCRD: CPT | Mod: CPTII,S$GLB,, | Performed by: PSYCHIATRY & NEUROLOGY

## 2025-02-11 PROCEDURE — 1101F PT FALLS ASSESS-DOCD LE1/YR: CPT | Mod: CPTII,S$GLB,, | Performed by: PSYCHIATRY & NEUROLOGY

## 2025-02-11 PROCEDURE — 1125F AMNT PAIN NOTED PAIN PRSNT: CPT | Mod: CPTII,S$GLB,, | Performed by: PSYCHIATRY & NEUROLOGY

## 2025-02-11 PROCEDURE — 3075F SYST BP GE 130 - 139MM HG: CPT | Mod: CPTII,S$GLB,, | Performed by: PSYCHIATRY & NEUROLOGY

## 2025-02-11 PROCEDURE — 99203 OFFICE O/P NEW LOW 30 MIN: CPT | Mod: S$GLB,,, | Performed by: PSYCHIATRY & NEUROLOGY

## 2025-02-11 PROCEDURE — 3079F DIAST BP 80-89 MM HG: CPT | Mod: CPTII,S$GLB,, | Performed by: PSYCHIATRY & NEUROLOGY

## 2025-02-11 PROCEDURE — 1160F RVW MEDS BY RX/DR IN RCRD: CPT | Mod: CPTII,S$GLB,, | Performed by: PSYCHIATRY & NEUROLOGY

## 2025-02-11 PROCEDURE — 99999 PR PBB SHADOW E&M-EST. PATIENT-LVL IV: CPT | Mod: PBBFAC,,, | Performed by: PSYCHIATRY & NEUROLOGY

## 2025-02-11 PROCEDURE — 3288F FALL RISK ASSESSMENT DOCD: CPT | Mod: CPTII,S$GLB,, | Performed by: PSYCHIATRY & NEUROLOGY

## 2025-02-11 NOTE — PROGRESS NOTES
"    HPI: Giselle Rios is a 84 y.o. female with "headache" in 12/2024.    She points to the neck and this radiates into the shoulder region      Was seen in the ER by ER providers in 12/2024 for headache which was posterior in nature.    No clear provoking factors    Had CT head and CTA head and neck as noted below along with labs    Again, she states this was neck pain.    Given fioricet and eventually this got better.      Since leaving the ER she continued with neck pain which lasted a few days. This pain was severe    No neck pain and no headaches since then.    She does not have much neck pain and does not have a prior migraine headaches    She does have a history of ACDF       Review of Systems   Constitutional:  Negative for fever.   HENT:  Negative for nosebleeds.    Eyes:  Negative for blurred vision.   Respiratory:  Negative for hemoptysis.    Cardiovascular:  Negative for leg swelling.   Gastrointestinal:  Negative for blood in stool.   Genitourinary:  Negative for hematuria.   Musculoskeletal:  Negative for neck pain.   Skin:  Negative for rash.   Neurological:  Negative for headaches.   Endo/Heme/Allergies:  Does not bruise/bleed easily.         I have reviewed all of this patient's past medical and surgical histories as well as family and social histories and active allergies and medications as documented in the electronic medical record.            Exam:  Gen Appearance, well developed/nourished in no apparent distress  CV: 2+ distal pulses with no edema or swelling  Neuro:  MS: Awake, alert, oriented to place, person, time, situation. Sustains attention. Recent/remote memory intact, Language is full to spontaneous speech/repetition/naming/comprehension. Fund of Knowledge is full  CN: Optic discs are flat with normal vasculature, PERRL, Extraoccular movements and visual fields are full. Normal facial sensation and strength, Hearing symmetric, Tongue and Palate are midline and strong. Shoulder Shrug " "symmetric and strong.  Motor: Normal bulk, tone, no abnormal movements. 5/5 strength bilateral upper/lower extremities with 2+ reflexes and  no clonus  Sensory: symmetric to temp, and vibration, Romberg negative  Cerebellar: Finger-nose,Heal-shin, Rapid alternating movements intact  Gait: Normal stance, no ataxia  +Palpable spasm in the neck region    Imagin/2024 CT head with CTA head and neck: Age-appropriate generalized cerebral volume loss with moderate chronic microvascular ischemic change.  No evidence for acute intracranial hemorrhage or new parenchymal hypoattenuation to suggest an acute infarction.  No abnormal parenchymal enhancement.  Please note that MRI is more sensitive to the detection of acute ischemia.     Calcified atheromatous disease involving the bilateral carotid bulbs with soft plaque in the left carotid bulb resulting in approximately 30% luminal narrowing.     No focal stenosis, occlusion or aneurysm involving the [place].    Labs:    2024 CMP,CBC, and TSH unremarkable      Assessment/Plan: Giselle Rios is a 84 y.o. female who suffered severe "headache" in 2024 which was neck pain/ cervical radicular pain to the shoulder  by history     I recommend:     1. 2024 CT head with CTA head and neck reassuring    2. Also noted by CTA: Reversal of the normal cervical lordosis. Suspected postoperative changes at the C4 through C6 level with osseous fusion of the vertebral bodies.   -She has a history of ACDF  -Her pain seemed cervicogenic by history but this is resoled now  -Will consult PT and update C spine imaging for any return of pain    3. Note: History of small traumatic SDH in 2019 noted. She remains on NOAC  for history of DVT/PE history     RTC 6 months, but notify me sooner if more pain      "

## 2025-02-25 ENCOUNTER — OFFICE VISIT (OUTPATIENT)
Dept: CARDIOLOGY | Facility: CLINIC | Age: 85
End: 2025-02-25
Payer: MEDICARE

## 2025-02-25 VITALS
BODY MASS INDEX: 27.03 KG/M2 | HEART RATE: 64 BPM | HEIGHT: 68 IN | WEIGHT: 178.38 LBS | SYSTOLIC BLOOD PRESSURE: 124 MMHG | RESPIRATION RATE: 18 BRPM | OXYGEN SATURATION: 98 % | DIASTOLIC BLOOD PRESSURE: 65 MMHG

## 2025-02-25 DIAGNOSIS — I25.10 CORONARY ARTERY DISEASE INVOLVING NATIVE CORONARY ARTERY OF NATIVE HEART WITHOUT ANGINA PECTORIS: ICD-10-CM

## 2025-02-25 DIAGNOSIS — I26.99 OTHER ACUTE PULMONARY EMBOLISM WITHOUT ACUTE COR PULMONALE: ICD-10-CM

## 2025-02-25 DIAGNOSIS — I50.42 CHRONIC COMBINED SYSTOLIC AND DIASTOLIC CONGESTIVE HEART FAILURE: ICD-10-CM

## 2025-02-25 DIAGNOSIS — I10 PRIMARY HYPERTENSION: ICD-10-CM

## 2025-02-25 DIAGNOSIS — I70.0 AORTIC ATHEROSCLEROSIS: ICD-10-CM

## 2025-02-25 DIAGNOSIS — I82.491 ACUTE DEEP VEIN THROMBOSIS (DVT) OF OTHER SPECIFIED VEIN OF RIGHT LOWER EXTREMITY: ICD-10-CM

## 2025-02-25 DIAGNOSIS — R00.1 BRADYCARDIA: Primary | ICD-10-CM

## 2025-02-25 DIAGNOSIS — I35.9 AORTIC VALVE DISEASE: ICD-10-CM

## 2025-02-25 DIAGNOSIS — I65.23 BILATERAL CAROTID ARTERY STENOSIS: ICD-10-CM

## 2025-02-25 DIAGNOSIS — E78.5 DYSLIPIDEMIA: ICD-10-CM

## 2025-02-25 PROCEDURE — 3074F SYST BP LT 130 MM HG: CPT | Mod: CPTII,S$GLB,, | Performed by: INTERNAL MEDICINE

## 2025-02-25 PROCEDURE — 99999 PR PBB SHADOW E&M-EST. PATIENT-LVL IV: CPT | Mod: PBBFAC,,, | Performed by: INTERNAL MEDICINE

## 2025-02-25 PROCEDURE — 1160F RVW MEDS BY RX/DR IN RCRD: CPT | Mod: CPTII,S$GLB,, | Performed by: INTERNAL MEDICINE

## 2025-02-25 PROCEDURE — 3078F DIAST BP <80 MM HG: CPT | Mod: CPTII,S$GLB,, | Performed by: INTERNAL MEDICINE

## 2025-02-25 PROCEDURE — 1159F MED LIST DOCD IN RCRD: CPT | Mod: CPTII,S$GLB,, | Performed by: INTERNAL MEDICINE

## 2025-02-25 PROCEDURE — 99215 OFFICE O/P EST HI 40 MIN: CPT | Mod: S$GLB,,, | Performed by: INTERNAL MEDICINE

## 2025-02-25 NOTE — ASSESSMENT & PLAN NOTE
She had a negative stress test in 2022.  She has never had a diagnosis of coronary artery disease, no previous stent.

## 2025-02-25 NOTE — ASSESSMENT & PLAN NOTE
Her Holter did show a significant period of time with mild sinus bradycardia.  She has no complaints related to bradycardia currently.  Her Holter also showed frequent PVCs.  No ectopy noted on exam today.

## 2025-02-25 NOTE — ASSESSMENT & PLAN NOTE
Most recent echo confirmed normal ejection fraction.  Etiology of 2022 reduction in EF to 40% unclear.

## 2025-02-25 NOTE — ASSESSMENT & PLAN NOTE
Following back surgery.  The event was in December.  She will continue Eliquis at least until June.

## 2025-02-25 NOTE — ASSESSMENT & PLAN NOTE
Her last echo showed peak aortic velocity 1.9 m/sec in 2024 March.  She has a very soft heart murmur.  Condition stable.

## 2025-02-25 NOTE — ASSESSMENT & PLAN NOTE
No progression of disease since last evaluation, 2024 December CTA next study ordered for headaches.  Mild disease confirmed.

## 2025-02-25 NOTE — ASSESSMENT & PLAN NOTE
Diovan-HCTZ to continue.  She has normal renal function.  Her blood pressure is normal today.  Condition stable.

## 2025-02-25 NOTE — PROGRESS NOTES
[facility] Cardiology     Subjective:    Patient ID:  Giselle Rios is a 84 y.o. female who presents for follow-up of History of pulmonary embolus 12/2024, Hyperlipidemia, and Hypertension    Review of patient's allergies indicates:  No Known Allergies  She was in the office complaining of weakness episodes that were occurring intermittently.  A Holter was ordered for concerns regarding bradycardia.  It showed PVCs, no pauses or significant bradycardia.  An echo was done showing normal ejection fraction.  She has a history of reduced ejection fraction several years ago.    Since I last saw her she had back surgery complicated by right popliteal vein DVT and pulmonary embolus.  She remains on Eliquis.  She had calf pain prompting workup leading to diagnosis.  She has tolerated anticoagulation well.    She has not had dizzy spells or any complaints that would relate to bradycardia.  She does not feel palpitations.  She remains on Diovan-HCTZ for hypertension.  Her last LDL was 75 mg%.  She has mild carotid disease by CTA next study.  She has upcoming injection for left knee arthritis.  She will not have to stop anticoagulation.  She has had them before.         Review of Systems   Constitutional: Negative for chills, decreased appetite, diaphoresis, fever, malaise/fatigue, night sweats, weight gain and weight loss.   HENT:  Negative for congestion, ear discharge, ear pain, hearing loss, hoarse voice, nosebleeds, odynophagia, sore throat, stridor and tinnitus.    Eyes:  Negative for blurred vision, discharge, double vision, pain, photophobia, redness, vision loss in left eye, vision loss in right eye, visual disturbance and visual halos.   Cardiovascular:  Negative for chest pain, claudication, cyanosis, dyspnea on exertion, irregular heartbeat, leg swelling, near-syncope, orthopnea, palpitations, paroxysmal nocturnal dyspnea and syncope.  "  Respiratory:  Negative for cough, hemoptysis, shortness of breath, sleep disturbances due to breathing, snoring, sputum production and wheezing.    Endocrine: Negative for cold intolerance, heat intolerance, polydipsia, polyphagia and polyuria.   Hematologic/Lymphatic: Negative for adenopathy and bleeding problem. Does not bruise/bleed easily.   Skin:  Negative for color change, dry skin, flushing, itching, nail changes, poor wound healing, rash, skin cancer, suspicious lesions and unusual hair distribution.   Musculoskeletal:  Positive for back pain, joint pain and neck pain. Negative for arthritis, falls, gout, joint swelling, muscle cramps, muscle weakness, myalgias and stiffness.   Gastrointestinal:  Negative for bloating, abdominal pain, anorexia, change in bowel habit, bowel incontinence, constipation, diarrhea, dysphagia, excessive appetite, flatus, heartburn, hematemesis, hematochezia, hemorrhoids, jaundice, melena, nausea and vomiting.   Genitourinary:  Negative for bladder incontinence, decreased libido, dysuria, flank pain, frequency, genital sores, hematuria, hesitancy, incomplete emptying, nocturia and urgency.   Neurological:  Negative for aphonia, brief paralysis, difficulty with concentration, disturbances in coordination, excessive daytime sleepiness, dizziness, focal weakness, headaches, light-headedness, loss of balance, numbness, paresthesias, seizures, sensory change, tremors, vertigo and weakness.   Psychiatric/Behavioral:  Negative for altered mental status, depression, hallucinations, memory loss, substance abuse, suicidal ideas and thoughts of violence. The patient does not have insomnia and is not nervous/anxious.    Allergic/Immunologic: Negative for hives and persistent infections.        Objective:       Vitals:    02/25/25 1350   BP: 124/65   Patient Position: Sitting   Pulse: 64   Resp: 18   SpO2: 98%   Weight: 80.9 kg (178 lb 5.6 oz)   Height: 5' 8" (1.727 m)    Physical " Exam  Constitutional:       General: She is not in acute distress.     Appearance: She is well-developed. She is not diaphoretic.   HENT:      Head: Normocephalic and atraumatic.      Nose: Nose normal.   Eyes:      General: No scleral icterus.        Right eye: No discharge.      Conjunctiva/sclera: Conjunctivae normal.      Pupils: Pupils are equal, round, and reactive to light.   Neck:      Thyroid: No thyromegaly.      Vascular: No JVD.      Trachea: No tracheal deviation.   Cardiovascular:      Rate and Rhythm: Normal rate and regular rhythm.      Pulses:           Carotid pulses are 2+ on the right side and 2+ on the left side.       Radial pulses are 2+ on the right side and 2+ on the left side.        Dorsalis pedis pulses are 2+ on the right side and 2+ on the left side.        Posterior tibial pulses are 2+ on the right side and 2+ on the left side.      Heart sounds: Murmur heard.      Harsh early systolic murmur is present with a grade of 1/6 at the upper right sternal border.      No friction rub. No gallop.   Pulmonary:      Effort: Pulmonary effort is normal. No respiratory distress.      Breath sounds: Normal breath sounds. No stridor. No wheezing or rales.   Chest:      Chest wall: No tenderness.   Abdominal:      General: Bowel sounds are normal. There is no distension.      Palpations: Abdomen is soft. There is no mass.      Tenderness: There is no abdominal tenderness. There is no guarding or rebound.   Musculoskeletal:         General: No tenderness. Normal range of motion.      Cervical back: Normal range of motion and neck supple.   Lymphadenopathy:      Cervical: No cervical adenopathy.   Skin:     General: Skin is warm and dry.      Coloration: Skin is not pale.      Findings: No erythema or rash.   Neurological:      Mental Status: She is alert and oriented to person, place, and time.      Cranial Nerves: No cranial nerve deficit.      Coordination: Coordination normal.   Psychiatric:          Behavior: Behavior normal.         Thought Content: Thought content normal.         Judgment: Judgment normal.           Assessment:       1. Bradycardia    2. Aortic atherosclerosis    3. Aortic valve disease    4. Bilateral carotid artery stenosis    5. Coronary artery disease involving native coronary artery of native heart without angina pectoris    6. Dyslipidemia    7. Chronic combined systolic and diastolic congestive heart failure    8. Acute deep vein thrombosis (DVT) of other specified vein of right lower extremity    9. Other acute pulmonary embolism without acute cor pulmonale    10. Primary hypertension      Results for orders placed or performed in visit on 01/15/25   HEPATIC FUNCTION PANEL    Collection Time: 01/15/25  1:13 PM   Result Value Ref Range    Total Protein 7.1 6.0 - 8.4 g/dL    Albumin 3.6 3.5 - 5.2 g/dL    Total Bilirubin 0.4 0.1 - 1.0 mg/dL    Bilirubin, Direct 0.2 0.1 - 0.3 mg/dL    AST 18 10 - 40 U/L    ALT 15 10 - 44 U/L    Alkaline Phosphatase 63 40 - 150 U/L   LIPASE    Collection Time: 01/15/25  1:13 PM   Result Value Ref Range    Lipase 37 4 - 60 U/L   CBC W/ AUTO DIFFERENTIAL    Collection Time: 01/15/25  1:13 PM   Result Value Ref Range    WBC 5.87 3.90 - 12.70 K/uL    RBC 4.12 4.00 - 5.40 M/uL    Hemoglobin 12.7 12.0 - 16.0 g/dL    Hematocrit 40.5 37.0 - 48.5 %    MCV 98 82 - 98 fL    MCH 30.8 27.0 - 31.0 pg    MCHC 31.4 (L) 32.0 - 36.0 g/dL    RDW 13.9 11.5 - 14.5 %    Platelets 168 150 - 450 K/uL    MPV 11.2 9.2 - 12.9 fL    Immature Granulocytes 0.2 0.0 - 0.5 %    Gran # (ANC) 3.7 1.8 - 7.7 K/uL    Immature Grans (Abs) 0.01 0.00 - 0.04 K/uL    Lymph # 1.5 1.0 - 4.8 K/uL    Mono # 0.5 0.3 - 1.0 K/uL    Eos # 0.1 0.0 - 0.5 K/uL    Baso # 0.04 0.00 - 0.20 K/uL    nRBC 0 0 /100 WBC    Gran % 62.8 38.0 - 73.0 %    Lymph % 25.4 18.0 - 48.0 %    Mono % 8.9 4.0 - 15.0 %    Eosinophil % 2.0 0.0 - 8.0 %    Basophil % 0.7 0.0 - 1.9 %    Differential Method Automated    IRON AND TIBC     Collection Time: 01/15/25  1:13 PM   Result Value Ref Range    Iron 70 30 - 160 ug/dL    Transferrin 224 200 - 375 mg/dL    TIBC 332 250 - 450 ug/dL    Saturated Iron 21 20 - 50 %   FERRITIN    Collection Time: 01/15/25  1:13 PM   Result Value Ref Range    Ferritin 49 20.0 - 300.0 ng/mL       Current Medications[1]     Lab Results   Component Value Date    WBC 5.87 01/15/2025    RBC 4.12 01/15/2025    HGB 12.7 01/15/2025    HCT 40.5 01/15/2025    MCV 98 01/15/2025    MCH 30.8 01/15/2025    MCHC 31.4 (L) 01/15/2025    RDW 13.9 01/15/2025     01/15/2025    MPV 11.2 01/15/2025    GRAN 3.7 01/15/2025    GRAN 62.8 01/15/2025    LYMPH 1.5 01/15/2025    LYMPH 25.4 01/15/2025    MONO 0.5 01/15/2025    MONO 8.9 01/15/2025    EOS 0.1 01/15/2025    BASO 0.04 01/15/2025    EOSINOPHIL 2.0 01/15/2025    BASOPHIL 0.7 01/15/2025    MG 2.1 08/27/2020        CMP  Lab Results   Component Value Date     12/23/2024    K 4.5 12/23/2024     12/23/2024    CO2 24 12/23/2024     (H) 12/23/2024    BUN 21 12/23/2024    CREATININE 0.9 12/23/2024    CALCIUM 9.4 12/23/2024    PROT 7.1 01/15/2025    ALBUMIN 3.6 01/15/2025    BILITOT 0.4 01/15/2025    ALKPHOS 63 01/15/2025    AST 18 01/15/2025    ALT 15 01/15/2025    ANIONGAP 10 12/23/2024    ESTGFRAFRICA 54 (A) 08/22/2021    EGFRNONAA 47 (A) 08/22/2021        Lab Results   Component Value Date    LABURIN No growth 06/09/2022            Results for orders placed or performed during the hospital encounter of 12/12/24   EKG 12-lead    Collection Time: 12/12/24 11:04 AM   Result Value Ref Range    QRS Duration 136 ms    [facility] QTC Calculation 411 ms    Narrative    Test Reason : R06.02    Vent. Rate :  55 BPM     Atrial Rate :  55 BPM     P-R Int : 222 ms          QRS Dur : 136 ms      QT Int : 430 ms       P-R-T Axes :  43 -15 -10 degrees    QTcB Int : 411 ms    Sinus bradycardia with 1st degree A-V block  Nonspecific intraventricular block  Abnormal ECG  When compared with  ECG of 17-Oct-2023 14:22,  PA interval has increased    Confirmed by Scott Anderson ([id number]) on 12/13/2024 7:42:08 AM    Referred By: KMREFERRAL SELF           Confirmed By: Scott Anderson                   Plan:       Problem List Items Addressed This Visit          Cardiac/Vascular    Dyslipidemia    Most recent LDL 75 mg%.  Rosuvastatin 20 mg to continue.         Primary hypertension    Diovan-HCTZ to continue.  She has normal renal function.  Her blood pressure is normal today.  Condition stable.         Aortic atherosclerosis    Condition unchanged.         Bilateral carotid artery stenosis    No progression of disease since last evaluation, 2024 December CTA next study ordered for headaches.  Mild disease confirmed.         Chronic combined systolic and diastolic congestive heart failure    Most recent echo confirmed normal ejection fraction.  Etiology of 2022 reduction in EF to 40% unclear.           Aortic valve disease    Her last echo showed peak aortic velocity 1.9 m/sec in 2024 March.  She has a very soft heart murmur.  Condition stable.         Bradycardia - Primary    Her Holter did show a significant period of time with mild sinus bradycardia.  She has no complaints related to bradycardia currently.  Her Holter also showed frequent PVCs.  No ectopy noted on exam today.         Coronary artery disease involving native coronary artery of native heart without angina pectoris    She had a negative stress test in 2022.  She has never had a diagnosis of coronary artery disease, no previous stent.            Hematology    Acute deep vein thrombosis (DVT) of right lower extremity    She has been on Eliquis.  Condition improved.         Acute pulmonary embolism    Following back surgery.  The event was in December.  She will continue Eliquis at least until June.               I made a six-month follow-up.  She seems to be doing fairly well from a cardiac standpoint.  I did not make changes..                 Scott  WILLIAM Anderson MD  02/25/2025   2:21 PM               [1]   Current Outpatient Medications:     apixaban (ELIQUIS) 5 mg Tab, Take 1 tablet (5 mg total) by mouth 2 (two) times daily., Disp: 60 tablet, Rfl: 2    ascorbic acid, vitamin C, (VITAMIN C) 500 mg TbSR, take once daily, Disp: , Rfl:     aspirin (ECOTRIN) 81 MG EC tablet, Take 1 tablet (81 mg total) by mouth once daily., Disp: , Rfl:     b complex vitamins capsule, Take 1 capsule by mouth once daily., Disp: , Rfl:     calcium carbonate (CALCIUM 600 ORAL), 1 tablet a day, Disp: , Rfl:     cholecalciferol, vitamin D3, (VITAMIN D3) 1,000 unit capsule, Take 1,000 Units by mouth once daily., Disp: , Rfl:     levothyroxine (SYNTHROID, LEVOTHROID) 175 MCG tablet, Take 175 mcg by mouth once daily., Disp: , Rfl:     MAGNESIUM ORAL, Take by mouth once daily., Disp: , Rfl:     meloxicam (MOBIC) 7.5 MG tablet, Take 7.5 mg by mouth daily as needed for Pain., Disp: , Rfl:     rosuvastatin (CRESTOR) 20 MG tablet, Take 1 tablet (20 mg total) by mouth once daily., Disp: 90 tablet, Rfl: 1    valsartan-hydrochlorothiazide (DIOVAN-HCT) 160-12.5 mg per tablet, Take 1 tablet by mouth once daily., Disp: 90 tablet, Rfl: 1    zinc sulfate (ZINC-220 ORAL), Take by mouth once daily., Disp: , Rfl:

## 2025-03-31 ENCOUNTER — HOSPITAL ENCOUNTER (EMERGENCY)
Facility: HOSPITAL | Age: 85
Discharge: HOME | End: 2025-03-31
Attending: FAMILY MEDICINE
Payer: MEDICARE

## 2025-03-31 VITALS
HEIGHT: 68 IN | WEIGHT: 175.63 LBS | HEART RATE: 56 BPM | DIASTOLIC BLOOD PRESSURE: 63 MMHG | OXYGEN SATURATION: 98 % | RESPIRATION RATE: 16 BRPM | SYSTOLIC BLOOD PRESSURE: 135 MMHG | TEMPERATURE: 98 F | BODY MASS INDEX: 26.62 KG/M2

## 2025-03-31 DIAGNOSIS — R10.9 LEFT FLANK PAIN: Primary | ICD-10-CM

## 2025-03-31 LAB
ABSOLUTE EOSINOPHIL (OHS): 0.08 K/UL
ABSOLUTE MONOCYTE (OHS): 0.48 K/UL (ref 0.3–1)
ABSOLUTE NEUTROPHIL COUNT (OHS): 3.09 K/UL (ref 1.8–7.7)
ALBUMIN SERPL BCP-MCNC: 3.9 G/DL (ref 3.5–5.2)
ALP SERPL-CCNC: 50 UNIT/L (ref 40–150)
ALT SERPL W/O P-5'-P-CCNC: 14 UNIT/L (ref 10–44)
ANION GAP (OHS): 9 MMOL/L (ref 8–16)
AST SERPL-CCNC: 23 UNIT/L (ref 11–45)
BACTERIA #/AREA URNS HPF: ABNORMAL /HPF
BASOPHILS # BLD AUTO: 0.05 K/UL
BASOPHILS NFR BLD AUTO: 1 %
BILIRUB SERPL-MCNC: 0.7 MG/DL (ref 0.1–1)
BILIRUB UR QL STRIP.AUTO: ABNORMAL
BUN SERPL-MCNC: 23 MG/DL (ref 8–23)
CALCIUM SERPL-MCNC: 9 MG/DL (ref 8.7–10.5)
CHLORIDE SERPL-SCNC: 108 MMOL/L (ref 95–110)
CLARITY UR: ABNORMAL
CO2 SERPL-SCNC: 26 MMOL/L (ref 23–29)
COLOR UR AUTO: YELLOW
CREAT SERPL-MCNC: 0.9 MG/DL (ref 0.5–1.4)
ERYTHROCYTE [DISTWIDTH] IN BLOOD BY AUTOMATED COUNT: 13.8 % (ref 11.5–14.5)
GFR SERPLBLD CREATININE-BSD FMLA CKD-EPI: >60 ML/MIN/1.73/M2
GLUCOSE SERPL-MCNC: 94 MG/DL (ref 70–110)
GLUCOSE UR QL STRIP: NEGATIVE
GRAN CASTS #/AREA URNS LPF: 1 /LPF (ref ?–0)
HCT VFR BLD AUTO: 38.2 % (ref 37–48.5)
HGB BLD-MCNC: 12.4 GM/DL (ref 12–16)
HGB UR QL STRIP: NEGATIVE
HYALINE CASTS #/AREA URNS LPF: 5 /LPF (ref 0–1)
IMM GRANULOCYTES # BLD AUTO: 0.01 K/UL (ref 0–0.04)
IMM GRANULOCYTES NFR BLD AUTO: 0.2 % (ref 0–0.5)
KETONES UR QL STRIP: ABNORMAL
LEUKOCYTE ESTERASE UR QL STRIP: ABNORMAL
LYMPHOCYTES # BLD AUTO: 1.23 K/UL (ref 1–4.8)
MAGNESIUM SERPL-MCNC: 2.5 MG/DL (ref 1.6–2.6)
MCH RBC QN AUTO: 31.2 PG (ref 27–31)
MCHC RBC AUTO-ENTMCNC: 32.5 G/DL (ref 32–36)
MCV RBC AUTO: 96 FL (ref 82–98)
MICROSCOPIC COMMENT: ABNORMAL
NITRITE UR QL STRIP: NEGATIVE
NUCLEATED RBC (/100WBC) (OHS): 0 /100 WBC
PH UR STRIP: 7 [PH]
PLATELET # BLD AUTO: 142 K/UL (ref 150–450)
PMV BLD AUTO: 11.4 FL (ref 9.2–12.9)
POTASSIUM SERPL-SCNC: 4.4 MMOL/L (ref 3.5–5.1)
PROT SERPL-MCNC: 7.1 GM/DL (ref 6–8.4)
PROT UR QL STRIP: ABNORMAL
RBC # BLD AUTO: 3.97 M/UL (ref 4–5.4)
RBC #/AREA URNS HPF: 1 /HPF (ref 0–4)
RELATIVE EOSINOPHIL (OHS): 1.6 %
RELATIVE LYMPHOCYTE (OHS): 24.9 % (ref 18–48)
RELATIVE MONOCYTE (OHS): 9.7 % (ref 4–15)
RELATIVE NEUTROPHIL (OHS): 62.6 % (ref 38–73)
SODIUM SERPL-SCNC: 143 MMOL/L (ref 136–145)
SP GR UR STRIP: 1.02
SQUAMOUS #/AREA URNS HPF: 6 /HPF
UROBILINOGEN UR STRIP-ACNC: NEGATIVE EU/DL
WBC # BLD AUTO: 4.94 K/UL (ref 3.9–12.7)
WBC #/AREA URNS HPF: 7 /HPF (ref 0–5)

## 2025-03-31 PROCEDURE — 99283 EMERGENCY DEPT VISIT LOW MDM: CPT

## 2025-03-31 PROCEDURE — 84075 ASSAY ALKALINE PHOSPHATASE: CPT | Performed by: FAMILY MEDICINE

## 2025-03-31 PROCEDURE — 85025 COMPLETE CBC W/AUTO DIFF WBC: CPT | Performed by: FAMILY MEDICINE

## 2025-03-31 PROCEDURE — 81000 URINALYSIS NONAUTO W/SCOPE: CPT | Performed by: FAMILY MEDICINE

## 2025-03-31 PROCEDURE — 36415 COLL VENOUS BLD VENIPUNCTURE: CPT | Performed by: FAMILY MEDICINE

## 2025-03-31 PROCEDURE — 83735 ASSAY OF MAGNESIUM: CPT | Performed by: FAMILY MEDICINE

## 2025-03-31 PROCEDURE — 25000003 PHARM REV CODE 250: Performed by: FAMILY MEDICINE

## 2025-03-31 RX ORDER — AMOXICILLIN AND CLAVULANATE POTASSIUM 875; 125 MG/1; MG/1
1 TABLET, FILM COATED ORAL
Status: COMPLETED | OUTPATIENT
Start: 2025-03-31 | End: 2025-03-31

## 2025-03-31 RX ORDER — AMOXICILLIN AND CLAVULANATE POTASSIUM 875; 125 MG/1; MG/1
1 TABLET, FILM COATED ORAL 2 TIMES DAILY
Qty: 14 TABLET | Refills: 0 | Status: SHIPPED | OUTPATIENT
Start: 2025-03-31

## 2025-03-31 RX ADMIN — AMOXICILLIN AND CLAVULANATE POTASSIUM 1 TABLET: 875; 125 TABLET, FILM COATED ORAL at 05:03

## 2025-04-02 ENCOUNTER — OFFICE VISIT (OUTPATIENT)
Dept: INTERNAL MEDICINE | Facility: CLINIC | Age: 85
End: 2025-04-02
Payer: MEDICARE

## 2025-04-02 VITALS
BODY MASS INDEX: 26.97 KG/M2 | WEIGHT: 177.94 LBS | DIASTOLIC BLOOD PRESSURE: 80 MMHG | OXYGEN SATURATION: 97 % | HEIGHT: 68 IN | RESPIRATION RATE: 16 BRPM | SYSTOLIC BLOOD PRESSURE: 134 MMHG | HEART RATE: 63 BPM

## 2025-04-02 DIAGNOSIS — M89.9 DISORDER OF BONE, UNSPECIFIED: ICD-10-CM

## 2025-04-02 DIAGNOSIS — M47.814 THORACIC SPONDYLOSIS: Primary | ICD-10-CM

## 2025-04-02 DIAGNOSIS — Z87.81 HISTORY OF VERTEBRAL COMPRESSION FRACTURE: ICD-10-CM

## 2025-04-02 DIAGNOSIS — M54.6 MIDLINE THORACIC BACK PAIN, UNSPECIFIED CHRONICITY: ICD-10-CM

## 2025-04-02 DIAGNOSIS — I82.491 ACUTE DEEP VEIN THROMBOSIS (DVT) OF OTHER SPECIFIED VEIN OF RIGHT LOWER EXTREMITY: ICD-10-CM

## 2025-04-02 LAB
BILIRUB SERPL-MCNC: NORMAL MG/DL
BLOOD URINE, POC: NORMAL
COLOR, POC UA: YELLOW
GLUCOSE UR QL STRIP: NORMAL
KETONES UR QL STRIP: NORMAL
LEUKOCYTE ESTERASE URINE, POC: NORMAL
NITRITE, POC UA: NORMAL
PH, POC UA: 5.5
PROTEIN, POC: NORMAL
SPECIFIC GRAVITY, POC UA: 1.02
UROBILINOGEN, POC UA: 0.2

## 2025-04-02 PROCEDURE — 81001 URINALYSIS AUTO W/SCOPE: CPT | Mod: S$GLB,,, | Performed by: FAMILY MEDICINE

## 2025-04-02 PROCEDURE — 3079F DIAST BP 80-89 MM HG: CPT | Mod: CPTII,S$GLB,, | Performed by: FAMILY MEDICINE

## 2025-04-02 PROCEDURE — 99214 OFFICE O/P EST MOD 30 MIN: CPT | Mod: S$GLB,,, | Performed by: FAMILY MEDICINE

## 2025-04-02 PROCEDURE — 3288F FALL RISK ASSESSMENT DOCD: CPT | Mod: CPTII,S$GLB,, | Performed by: FAMILY MEDICINE

## 2025-04-02 PROCEDURE — 3075F SYST BP GE 130 - 139MM HG: CPT | Mod: CPTII,S$GLB,, | Performed by: FAMILY MEDICINE

## 2025-04-02 PROCEDURE — 1101F PT FALLS ASSESS-DOCD LE1/YR: CPT | Mod: CPTII,S$GLB,, | Performed by: FAMILY MEDICINE

## 2025-04-02 PROCEDURE — 1125F AMNT PAIN NOTED PAIN PRSNT: CPT | Mod: CPTII,S$GLB,, | Performed by: FAMILY MEDICINE

## 2025-04-02 PROCEDURE — 99999 PR PBB SHADOW E&M-EST. PATIENT-LVL IV: CPT | Mod: PBBFAC,,, | Performed by: FAMILY MEDICINE

## 2025-04-02 PROCEDURE — 1160F RVW MEDS BY RX/DR IN RCRD: CPT | Mod: CPTII,S$GLB,, | Performed by: FAMILY MEDICINE

## 2025-04-02 PROCEDURE — 1159F MED LIST DOCD IN RCRD: CPT | Mod: CPTII,S$GLB,, | Performed by: FAMILY MEDICINE

## 2025-04-02 RX ORDER — ALENDRONATE SODIUM TABLET 35 MG/1
35 TABLET ORAL
Qty: 4 TABLET | Refills: 11 | Status: SHIPPED | OUTPATIENT
Start: 2025-04-02 | End: 2026-04-02

## 2025-04-02 NOTE — PROGRESS NOTES
History of Present Illness    CHIEF COMPLAINT:  Patient presents today for follow up after emergency room visit for back pain    HISTORY OF PRESENT ILLNESS:  She developed acute back pain on Sunday at 1:00 PM that was severe enough to prevent sleep that night despite using topical medication. Due to pain severity, she sought emergency room care the following Monday. She denies any recent trauma or falls related to the current pain episode.    MEDICAL HISTORY:  She has a history of compression fractures, including an acute L4 fracture and an old L1 fracture from falling backwards while holding her great-grandbaby in July. She has experienced multiple falls, particularly with her handicapped daughter during embraces where both lose balance. One specific incident required assistance from her son after both fell in the living room. She has a history of diverticulitis and kidney stones from 30 years ago.    THROMBOEMBOLIC HISTORY:  She developed a blood clot in her leg following back surgery, presenting with severe redness and tenderness. This was followed by bilateral pulmonary emboli.    CURRENT MEDICATIONS:  She takes Eliquis since December 12th for pulmonary blood clots and vitamin D supplements.      ROS:  General: -fever, -chills, -fatigue, -weight gain, -weight loss  Eyes: -vision changes, -redness, -discharge  ENT: -ear pain, -nasal congestion, -sore throat  Cardiovascular: -chest pain, -palpitations, -lower extremity edema  Respiratory: -cough, -shortness of breath  Gastrointestinal: -abdominal pain, -nausea, -vomiting, -diarrhea, -constipation, -blood in stool  Genitourinary: -dysuria, -hematuria, -frequency  Musculoskeletal: +joint pain, -muscle pain, +pain with movement, +back pain, +limb pain  Skin: -rash, -lesion  Neurological: -headache, -dizziness, -numbness, -tingling  Psychiatric: -anxiety, -depression, -sleep difficulty       Physical Exam    General: No acute distress. Well-developed.  Well-nourished.  Cardiovascular: Regular rate. Regular rhythm. No murmurs. No rubs. No gallops. Normal S1, S2.  Respiratory: Normal respiratory effort. Clear to auscultation bilaterally. No rales. No rhonchi. No wheezing.  Abdomen: Soft. Non-tender. Non-distended. Normoactive bowel sounds.  Musculoskeletal: No  obvious deformity.  Extremities: No lower extremity edema.  Neurological: Alert & oriented x3. No slurred speech. Normal gait.  Psychiatric: Normal mood. Normal affect. Good insight. Good judgment.  Skin: Warm. Dry. No rash.       Assessment & Plan    S32.048A Other fracture of fourth lumbar vertebra, initial encounter for closed fracture  I26.99 Other pulmonary embolism without acute cor pulmonale  S32.018D Other fracture of first lumbar vertebra, subsequent encounter for fracture with routine healing  M54.50 Low back pain, unspecified  M81.0 Age-related osteoporosis without current pathological fracture  Z91.81 History of falling  Z87.19 Personal history of other diseases of the digestive system  Z79.01 Long term (current) use of anticoagulants    IMPRESSION:  - Reviewed recent ER visit and current symptoms, suspecting back strain rather than UTI given negative urine culture.  - Considered possibility of compression fracture given history of falls and previous L4 compression fracture, but pain location suggests this is less likely.  - Assessed need for osteoporosis treatment due to history of compression fractures.  - Evaluated continuation of Eliquis for pulmonary embolism, determining at least 1 year of treatment is necessary.  - Reviewed recent labs, finding no significant abnormalities.    ACUTE COMPRESSION FRACTURE OF L4 VERTEBRA:  - Ordered X-ray of the back to check for new compression fractures.  - Identified L4 as the site of an acute compression fracture.  - Examined the patient and identified the location of pain.  - Suspected the pain is related to the patient's back issues rather than a bladder  infection.    PULMONARY EMBOLISM:  - Continued Eliquis (apixaban) for at least 1 year from December 12th for pulmonary embolism.  - Consider referral to cardiology clinic for echocardiogram and evaluation for discontinuing Eliquis next December.  - Patient has a history of blood clots in both lungs since December.  - Acknowledged the need for continued anticoagulation therapy.  - Patient has been on Eliquis since December 12th for pulmonary embolism.  - Confirmed the need for continued anticoagulation therapy.    PREVIOUS COMPRESSION FRACTURE OF L1 VERTEBRA:  - Patient reports a fall in July that resulted in a compression fracture of L1.  - Performed vertebroplasty (cement injection) for L1 fracture.    LOW BACK PAIN:  - Advised the patient to rest until healed.  - Patient reports pain in the lower back area, which started on Sunday and persisted through the night.  - Patient visited the emergency room on Monday due to persistent pain.  - Prescribed pain medication.  - Patient reports persistent low back pain that started on Sunday.  - Prescribed pain medication and advised rest.    OSTEOPOROSIS:  - Explained that compression fractures, regardless of bone density, warrant treatment for osteoporosis.  - Discussed that Fosamax can reduce fracture risk by approximately 30%.  - Initiated Fosamax (alendronate) once weekly for osteoporosis.  - Instructed the patient to take the medication on an empty stomach.  - Advised to wait 1-2 hours before eating after taking the medication.  - Directed the patient to discontinue if stomach upset occurs and inform the doctor.  - Ordered vitamin D level blood test.  - Diagnosed osteoporosis based on the patient's history of compression fractures.  - May adjust vitamin D dosage based on test results.    HISTORY OF FALLS:  - Advised the patient to be careful to avoid falls.  - Patient reports multiple falls, including one in July and others with her handicapped daughter.  - Patient  reports being more careful to prevent falls.    HISTORY OF DIGESTIVE SYSTEM DISEASE:  - Patient reports a history of diverticulitis and dietary restrictions.    FOLLOW-UP:  - Ref  erred the patient to Dr. Richard (orthopedics).  - Suggested follow-up with Dr. Richard in 2 weeks.

## 2025-04-03 ENCOUNTER — HOSPITAL ENCOUNTER (OUTPATIENT)
Dept: RADIOLOGY | Facility: HOSPITAL | Age: 85
Discharge: HOME | End: 2025-04-03
Attending: FAMILY MEDICINE
Payer: MEDICARE

## 2025-04-03 ENCOUNTER — RESULTS FOLLOW-UP (OUTPATIENT)
Dept: FAMILY MEDICINE | Facility: CLINIC | Age: 85
End: 2025-04-03

## 2025-04-03 DIAGNOSIS — M47.814 THORACIC SPONDYLOSIS: ICD-10-CM

## 2025-04-03 PROCEDURE — 72070 X-RAY EXAM THORAC SPINE 2VWS: CPT | Mod: TC

## 2025-04-03 PROCEDURE — 72070 X-RAY EXAM THORAC SPINE 2VWS: CPT | Mod: 26,,, | Performed by: RADIOLOGY

## 2025-04-05 NOTE — ED PROVIDER NOTES
Encounter Date: 3/31/2025       History     Chief Complaint   Patient presents with    Flank Pain     HPI  84-year-old female with a history of hypothyroidism, , diverticulitis, obesity that presents to the ED with flank pain.  Patient denies any significant dysuria, hematuria.  She states that the pain is worse in the left flank.  She denies any diarrhea, constipation.  No fevers, chills.  Review of patient's allergies indicates:  No Known Allergies  Past Medical History:   Diagnosis Date    Hypothyroidism     Obesity      Past Surgical History:   Procedure Laterality Date    APPENDECTOMY      cervical fusion x 2      HYSTERECTOMY       Family History   Problem Relation Name Age of Onset    Thyroid nodules Father      Diabetes Sister      Nephrolithiasis Daughter      Breast cancer Daughter      Thyroid disease Paternal Uncle      Diabetes Maternal Grandmother       Social History[1]  Review of Systems   Constitutional:  Negative for chills and fever.   HENT:  Negative for sore throat.    Respiratory:  Negative for shortness of breath.    Cardiovascular:  Negative for chest pain.   Gastrointestinal:  Negative for diarrhea, nausea and vomiting.   Genitourinary:  Positive for flank pain. Negative for dysuria and frequency.   Neurological:  Negative for headaches.   All other systems reviewed and are negative.      Physical Exam     Initial Vitals [03/31/25 1535]   BP Pulse Resp Temp SpO2   104/60 78 16 98.1 °F (36.7 °C) 97 %      MAP       --         Physical Exam    Constitutional: She appears well-developed and well-nourished. She is not diaphoretic. No distress.   HENT:   Head: Normocephalic and atraumatic.   Right Ear: External ear normal.   Left Ear: External ear normal.   Eyes: EOM are normal. Pupils are equal, round, and reactive to light.   Neck: Neck supple.   Cardiovascular:  Normal rate and regular rhythm.           No murmur heard.  Pulmonary/Chest: Breath sounds normal. No respiratory distress. She has  no wheezes.   Abdominal: Abdomen is soft. She exhibits no distension. There is no abdominal tenderness.   Musculoskeletal:         General: No tenderness or edema.      Cervical back: Neck supple.     Neurological: She is alert and oriented to person, place, and time. GCS score is 15. GCS eye subscore is 4. GCS verbal subscore is 5. GCS motor subscore is 6.   Skin: Skin is warm and dry.   Psychiatric: She has a normal mood and affect.         ED Course   Procedures  Labs Reviewed   URINALYSIS, REFLEX TO URINE CULTURE - Abnormal       Result Value    Color, UA Yellow      Appearance, UA Hazy (*)     pH, UA 7.0      Spec Grav UA 1.020      Protein, UA 1+ (*)     Glucose, UA Negative      Ketones, UA 1+ (*)     Bilirubin, UA 1+ (*)     Blood, UA Negative      Nitrites, UA Negative      Urobilinogen, UA Negative      Leukocyte Esterase, UA Trace (*)    CBC WITH DIFFERENTIAL - Abnormal    WBC 4.94      RBC 3.97 (*)     HGB 12.4      HCT 38.2      MCV 96      MCH 31.2 (*)     MCHC 32.5      RDW 13.8      Platelet Count 142 (*)     MPV 11.4      Nucleated RBC 0      Neut % 62.6      Lymph % 24.9      Mono % 9.7      Eos % 1.6      Basophil % 1.0      Imm Grans % 0.2      Neut # 3.09      Lymph # 1.23      Mono # 0.48      Eos # 0.08      Baso # 0.05      Imm Grans # 0.01     URINALYSIS MICROSCOPIC - Abnormal    RBC, UA 1      WBC, UA 7 (*)     Bacteria, UA Occasional      Squamous Epithelial Cells, UA 6      Hyaline Casts, UA 5 (*)     Granular Casts 1 (*)     Microscopic Comment       COMPREHENSIVE METABOLIC PANEL - Normal    Sodium 143      Potassium 4.4      Chloride 108      CO2 26      Glucose 94      BUN 23      Creatinine 0.9      Calcium 9.0      Protein Total 7.1      Albumin 3.9      Bilirubin Total 0.7      ALP 50      AST 23      ALT 14      Anion Gap 9      eGFR >60     MAGNESIUM - Normal    Magnesium  2.5     CBC W/ AUTO DIFFERENTIAL    Narrative:     The following orders were created for panel order CBC auto  differential.  Procedure                               Abnormality         Status                     ---------                               -----------         ------                     CBC with Differential[[id number]]       Abnormal            Final result                 Please view results for these tests on the individual orders.          Imaging Results    None          Medications   amoxicillin-clavulanate 875-125mg per tablet 1 tablet (1 tablet Oral Given 3/31/25 1732)     Medical Decision Making  Differential diagnosis:  Diverticulitis, UTI, nephrolithiasis    84-year-old female that presents to the ED with left-sided flank pain.  Patient with history of diverticulitis however she does not have any tenderness to palpation in the left lower quadrant.  Labs reviewed.  No CT scanner is currently available.  Patient with very stable on exam and I have discussed with on-call physician  who agrees with me treating UTI with Augmentin which will also cover for possible diverticulitis.  Patient will follow-up tomorrow with her PCP.  Patient understands strict return precautions and is agreeable with plan.  She does not want to be transferred to outside facility to obtain CT scan.  All questions answered prior to discharge home.    Amount and/or Complexity of Data Reviewed  Labs: ordered.    Risk  Prescription drug management.               ED Course as of 04/05/25 0701   Mon Mar 31, 2025   1720 Dr rashid agrees with discharge home with augmentin and follow up tomorrow Dr. Danny Arellano. [FP]      ED Course User Index  [FP] Bianca Beatty MD                           Clinical Impression:  Final diagnoses:  [R10.9] Left flank pain (Primary)          ED Disposition Condition    Discharge Stable          ED Prescriptions       Medication Sig Dispense Start Date End Date Auth. Provider    amoxicillin-clavulanate 875-125mg (AUGMENTIN) 875-125 mg per tablet Take 1 tablet by mouth 2 (two) times daily. 14  tablet 3/31/2025 -- Bianca Beatty MD          Follow-up Information       Follow up With Specialties Details Why Contact Info    Danny Arellano MD Family Medicine In 1 day  [address] [place] [id number] [phone]                 [1]   Social History  Tobacco Use    Smoking status: Never    Smokeless tobacco: Never   Substance Use Topics    Alcohol use: No    Drug use: No        Bianca Beatty MD  04/05/25 0701

## 2025-04-08 ENCOUNTER — TELEPHONE (OUTPATIENT)
Dept: INTERNAL MEDICINE | Facility: CLINIC | Age: 85
End: 2025-04-08
Payer: MEDICARE

## 2025-04-08 NOTE — TELEPHONE ENCOUNTER
----- Message from Lauryn sent at 4/8/2025 10:06 AM CDT -----  Contact: Pt  Giselle RiosMRN: [id number]: [id number]: Danny ArellanoHome Phone      [phone] Phone      Not on file.Mobile          [phone]: Pt would like urine and xray results. Pt states she is in so much pain she cannot stand it,. Please advise [phone]

## 2025-04-10 ENCOUNTER — TELEPHONE (OUTPATIENT)
Dept: INTERNAL MEDICINE | Facility: CLINIC | Age: 85
End: 2025-04-10
Payer: MEDICARE

## 2025-04-10 NOTE — TELEPHONE ENCOUNTER
----- Message from Weston sent at 4/10/2025  8:48 AM CDT -----  Contact: Patient  Giselle RiosMRN: [id number]: 1940PCP: Danny Arellano.Home Phone      [phone] Phone      Not on file.Mobile          [phone]: would like results of back x-ray & urine culture -- please adviseCall [phone]: Eileen

## 2025-04-10 NOTE — TELEPHONE ENCOUNTER
Pt notified of results. States that Dr. Delgado said she might need to go to pain management so she got in touch with Dr. Richard's office and they are going to attempt to schedule further testing at [facility].

## 2025-04-11 ENCOUNTER — HOSPITAL ENCOUNTER (OUTPATIENT)
Dept: RADIOLOGY | Facility: HOSPITAL | Age: 85
Discharge: HOME | End: 2025-04-11
Attending: PAIN MEDICINE
Payer: MEDICARE

## 2025-04-11 DIAGNOSIS — S32.000A COMPRESSION FRACTURE OF LUMBAR VERTEBRA: ICD-10-CM

## 2025-04-11 DIAGNOSIS — S22.000A COMPRESSION FRACTURE OF THORACIC VERTEBRA: ICD-10-CM

## 2025-04-11 PROCEDURE — 72131 CT LUMBAR SPINE W/O DYE: CPT | Mod: TC

## 2025-04-11 PROCEDURE — 72128 CT CHEST SPINE W/O DYE: CPT | Mod: TC

## 2025-04-11 PROCEDURE — 72131 CT LUMBAR SPINE W/O DYE: CPT | Mod: 26,,, | Performed by: RADIOLOGY

## 2025-04-11 PROCEDURE — 72100 X-RAY EXAM L-S SPINE 2/3 VWS: CPT | Mod: 26,,, | Performed by: RADIOLOGY

## 2025-04-11 PROCEDURE — 72128 CT CHEST SPINE W/O DYE: CPT | Mod: 26,,, | Performed by: RADIOLOGY

## 2025-04-11 PROCEDURE — 72100 X-RAY EXAM L-S SPINE 2/3 VWS: CPT | Mod: TC

## 2025-04-20 ENCOUNTER — HOSPITAL ENCOUNTER (EMERGENCY)
Facility: HOSPITAL | Age: 85
Discharge: HOME | End: 2025-04-20
Attending: SURGERY
Payer: MEDICARE

## 2025-04-20 VITALS
TEMPERATURE: 98 F | OXYGEN SATURATION: 100 % | SYSTOLIC BLOOD PRESSURE: 138 MMHG | RESPIRATION RATE: 19 BRPM | HEART RATE: 70 BPM | DIASTOLIC BLOOD PRESSURE: 98 MMHG

## 2025-04-20 DIAGNOSIS — M79.604 RIGHT LEG PAIN: Primary | ICD-10-CM

## 2025-04-20 PROCEDURE — 99281 EMR DPT VST MAYX REQ PHY/QHP: CPT

## 2025-04-20 NOTE — ED PROVIDER NOTES
Encounter Date: 4/20/2025       History     Chief Complaint   Patient presents with    Leg Pain     Pt arrived to ED c/o intermittent right lower leg pain that started this morning. Pt reports history of blood clots to the same leg. Pt rates pain 0/10 at rest and 8/10 when the pain comes in a wave.      Giselle Rios is a 84 y.o. female with PMH of hypothyroidism and obesity presenting to the ED for evaluation of right leg pain.  Patient reports that she woke up this morning with pain in her right lower leg.  She currently denies pain, but is concerned due to previous history of DVT.  She currently takes Eliquis 5 mg twice daily in his compliant with medication.  She denies associated redness, swelling, or warmth.  Denies numbness or tingling to right lower extremity.    The history is provided by the patient.     Review of patient's allergies indicates:  No Known Allergies  Past Medical History:   Diagnosis Date    Hypothyroidism     Obesity      Past Surgical History:   Procedure Laterality Date    APPENDECTOMY      cervical fusion x 2      HYSTERECTOMY       Family History   Problem Relation Name Age of Onset    Thyroid nodules Father      Diabetes Sister      Nephrolithiasis Daughter      Breast cancer Daughter      Thyroid disease Paternal Uncle      Diabetes Maternal Grandmother       Social History[1]  Review of Systems   Constitutional:  Negative for activity change, chills and fever.   HENT:  Negative for congestion, ear discharge, ear pain, postnasal drip, sinus pressure, sinus pain and sore throat.    Respiratory:  Negative for cough, chest tightness and shortness of breath.    Cardiovascular:  Negative for chest pain.   Gastrointestinal:  Negative for abdominal distention, abdominal pain and nausea.   Genitourinary:  Negative for dysuria, frequency and urgency.   Musculoskeletal:  Positive for arthralgias (Right lower leg). Negative for back pain.   Skin:  Negative for rash.   Neurological:  Negative  for dizziness, weakness, light-headedness and numbness.   Hematological:  Does not bruise/bleed easily.       Physical Exam     Initial Vitals [04/20/25 1639]   BP Pulse Resp Temp SpO2   (!) 138/98 70 19 98 °F (36.7 °C) 100 %      MAP       --         Physical Exam    Nursing note and vitals reviewed.  Constitutional: She appears well-developed and well-nourished.   HENT:   Head: Normocephalic and atraumatic.   Eyes: Conjunctivae and EOM are normal. Pupils are equal, round, and reactive to light.   Neck: Neck supple.   Cardiovascular:  Normal rate, regular rhythm, normal heart sounds and intact distal pulses.           Pulmonary/Chest: Breath sounds normal.   Abdominal: Abdomen is soft. Bowel sounds are normal.   Musculoskeletal:         General: Normal range of motion.      Cervical back: Neck supple.      Comments: Negative Homans  +2 bilateral pedal pulses     Neurological: She is alert and oriented to person, place, and time. She has normal strength.   Skin: Skin is warm and dry.   Psychiatric: She has a normal mood and affect. Her behavior is normal. Judgment and thought content normal.         ED Course   Procedures  Labs Reviewed - No data to display       Imaging Results    None          Medications - No data to display  Medical Decision Making  Evaluation of an 84-year-old female presenting with right lower leg pain  Concerned due to history of DVT  Physical exam with no redness, swelling, or warmth.  Good distal pulses with negative Homans    Differential diagnosis includes skeletal pain, DVT    Risk  Risk Details: Stable for discharge home.  We unfortunately do not have ultrasound coverage today. OP order for DVT US of Right lower extremity placed.  Instructed patient to report to hospital tomorrow morning at 9:00 a.m..  Continue Eliquis as prescribed.  Tylenol for pain control. Patient/caregiver voices understanding and feels comfortable with discharge plan.      The patient acknowledges that close  follow up with medical provider is required. Instructed to follow up with PCP within 2 days. Patient was given specific return precautions. The patient agrees to comply with all instruction and directions given in the ER.                                        Clinical Impression:  Final diagnoses:  [M79.604] Right leg pain (Primary)          ED Disposition Condition    Discharge Stable          ED Prescriptions    None       Follow-up Information       Follow up With Specialties Details Why Contact Info    Danny Arellano MD Family Medicine Schedule an appointment as soon as possible for a visit in 2 days  [address] [place] [address]  [phone]                 [1]   Social History  Tobacco Use    Smoking status: Never    Smokeless tobacco: Never   Substance Use Topics    Alcohol use: No    Drug use: No        Maris Hewitt NP  04/20/25 1387

## 2025-04-20 NOTE — DISCHARGE INSTRUCTIONS
Patient has an ultrasound scheduled tomorrow morning at 9:00 a.m. with a an outpatient at [facility].  Check in at the main entrance.  Call the ER with any questions    Les Livingston M.D. 4:39 PM 4/20/2025

## 2025-04-20 NOTE — ED TRIAGE NOTES
Pt arrived to ED c/o intermittent right lower leg pain that started this morning. Pt reports history of blood clots to the same leg. Pt rates pain 0/10 at rest and 8/10 when the pain comes in a wave.

## 2025-04-21 ENCOUNTER — HOSPITAL ENCOUNTER (OUTPATIENT)
Dept: RADIOLOGY | Facility: HOSPITAL | Age: 85
Discharge: HOME | End: 2025-04-21
Attending: SURGERY
Payer: MEDICARE

## 2025-04-21 DIAGNOSIS — M79.604 RIGHT LEG PAIN: ICD-10-CM

## 2025-04-21 PROCEDURE — 93971 EXTREMITY STUDY: CPT | Mod: TC,RT

## 2025-04-21 PROCEDURE — 93971 EXTREMITY STUDY: CPT | Mod: 26,RT,, | Performed by: RADIOLOGY

## 2025-04-28 ENCOUNTER — LAB VISIT (OUTPATIENT)
Dept: LAB | Facility: HOSPITAL | Age: 85
End: 2025-04-28
Attending: FAMILY MEDICINE
Payer: MEDICARE

## 2025-04-28 DIAGNOSIS — Z87.81 HISTORY OF VERTEBRAL COMPRESSION FRACTURE: ICD-10-CM

## 2025-04-28 DIAGNOSIS — E78.5 DYSLIPIDEMIA: ICD-10-CM

## 2025-04-28 DIAGNOSIS — I26.99 ACUTE PULMONARY EMBOLISM WITHOUT ACUTE COR PULMONALE, UNSPECIFIED PULMONARY EMBOLISM TYPE: ICD-10-CM

## 2025-04-28 DIAGNOSIS — M89.9 DISORDER OF BONE, UNSPECIFIED: ICD-10-CM

## 2025-04-28 DIAGNOSIS — E03.9 HYPOTHYROIDISM, UNSPECIFIED TYPE: ICD-10-CM

## 2025-04-28 DIAGNOSIS — I10 PRIMARY HYPERTENSION: ICD-10-CM

## 2025-04-28 LAB
25(OH)D3+25(OH)D2 SERPL-MCNC: 43 NG/ML (ref 30–96)
ABSOLUTE EOSINOPHIL (OHS): 0.09 K/UL
ABSOLUTE MONOCYTE (OHS): 0.43 K/UL (ref 0.3–1)
ABSOLUTE NEUTROPHIL COUNT (OHS): 3.01 K/UL (ref 1.8–7.7)
ALBUMIN SERPL BCP-MCNC: 3.7 G/DL (ref 3.5–5.2)
ALP SERPL-CCNC: 45 UNIT/L (ref 40–150)
ALT SERPL W/O P-5'-P-CCNC: 12 UNIT/L (ref 10–44)
ANION GAP (OHS): 9 MMOL/L (ref 8–16)
AST SERPL-CCNC: 21 UNIT/L (ref 11–45)
BASOPHILS # BLD AUTO: 0.02 K/UL
BASOPHILS NFR BLD AUTO: 0.4 %
BILIRUB SERPL-MCNC: 0.6 MG/DL (ref 0.1–1)
BUN SERPL-MCNC: 26 MG/DL (ref 8–23)
CALCIUM SERPL-MCNC: 9.3 MG/DL (ref 8.7–10.5)
CHLORIDE SERPL-SCNC: 110 MMOL/L (ref 95–110)
CHOLEST SERPL-MCNC: 136 MG/DL (ref 120–199)
CHOLEST/HDLC SERPL: 2.9 {RATIO} (ref 2–5)
CO2 SERPL-SCNC: 24 MMOL/L (ref 23–29)
CREAT SERPL-MCNC: 0.9 MG/DL (ref 0.5–1.4)
ERYTHROCYTE [DISTWIDTH] IN BLOOD BY AUTOMATED COUNT: 13.8 % (ref 11.5–14.5)
GFR SERPLBLD CREATININE-BSD FMLA CKD-EPI: >60 ML/MIN/1.73/M2
GLUCOSE SERPL-MCNC: 103 MG/DL (ref 70–110)
HCT VFR BLD AUTO: 37.4 % (ref 37–48.5)
HDLC SERPL-MCNC: 47 MG/DL (ref 40–75)
HDLC SERPL: 34.6 % (ref 20–50)
HGB BLD-MCNC: 11.6 GM/DL (ref 12–16)
IMM GRANULOCYTES # BLD AUTO: 0.01 K/UL (ref 0–0.04)
IMM GRANULOCYTES NFR BLD AUTO: 0.2 % (ref 0–0.5)
LDLC SERPL CALC-MCNC: 70.4 MG/DL (ref 63–159)
LYMPHOCYTES # BLD AUTO: 0.98 K/UL (ref 1–4.8)
MCH RBC QN AUTO: 31.1 PG (ref 27–31)
MCHC RBC AUTO-ENTMCNC: 31 G/DL (ref 32–36)
MCV RBC AUTO: 100 FL (ref 82–98)
NONHDLC SERPL-MCNC: 89 MG/DL
NUCLEATED RBC (/100WBC) (OHS): 0 /100 WBC
PLATELET # BLD AUTO: 171 K/UL (ref 150–450)
PMV BLD AUTO: 11 FL (ref 9.2–12.9)
POTASSIUM SERPL-SCNC: 4.8 MMOL/L (ref 3.5–5.1)
PROT SERPL-MCNC: 7 GM/DL (ref 6–8.4)
RBC # BLD AUTO: 3.73 M/UL (ref 4–5.4)
RELATIVE EOSINOPHIL (OHS): 2 %
RELATIVE LYMPHOCYTE (OHS): 21.6 % (ref 18–48)
RELATIVE MONOCYTE (OHS): 9.5 % (ref 4–15)
RELATIVE NEUTROPHIL (OHS): 66.3 % (ref 38–73)
SODIUM SERPL-SCNC: 143 MMOL/L (ref 136–145)
T4 FREE SERPL-MCNC: 1.15 NG/DL (ref 0.71–1.51)
TRIGL SERPL-MCNC: 93 MG/DL (ref 30–150)
TSH SERPL-ACNC: 6.08 UIU/ML (ref 0.4–4)
WBC # BLD AUTO: 4.54 K/UL (ref 3.9–12.7)

## 2025-04-28 PROCEDURE — 82306 VITAMIN D 25 HYDROXY: CPT

## 2025-04-28 PROCEDURE — 36415 COLL VENOUS BLD VENIPUNCTURE: CPT

## 2025-04-28 PROCEDURE — 80061 LIPID PANEL: CPT

## 2025-04-28 PROCEDURE — 85025 COMPLETE CBC W/AUTO DIFF WBC: CPT

## 2025-04-28 PROCEDURE — 80053 COMPREHEN METABOLIC PANEL: CPT

## 2025-04-28 PROCEDURE — 84439 ASSAY OF FREE THYROXINE: CPT

## 2025-04-29 DIAGNOSIS — I26.99 ACUTE PULMONARY EMBOLISM WITHOUT ACUTE COR PULMONALE, UNSPECIFIED PULMONARY EMBOLISM TYPE: ICD-10-CM

## 2025-04-29 DIAGNOSIS — I82.491 ACUTE DEEP VEIN THROMBOSIS (DVT) OF OTHER SPECIFIED VEIN OF RIGHT LOWER EXTREMITY: ICD-10-CM

## 2025-04-29 NOTE — TELEPHONE ENCOUNTER
LOV:  04/02/2025  Pt requesting refill of:   apixaban (ELIQUIS) 5 mg Tab   Medication pended     Please advise

## 2025-04-30 ENCOUNTER — OFFICE VISIT (OUTPATIENT)
Dept: INTERNAL MEDICINE | Facility: CLINIC | Age: 85
End: 2025-04-30
Payer: MEDICARE

## 2025-04-30 VITALS
BODY MASS INDEX: 26.93 KG/M2 | HEART RATE: 64 BPM | RESPIRATION RATE: 16 BRPM | DIASTOLIC BLOOD PRESSURE: 62 MMHG | HEIGHT: 68 IN | WEIGHT: 177.69 LBS | SYSTOLIC BLOOD PRESSURE: 130 MMHG | OXYGEN SATURATION: 95 %

## 2025-04-30 DIAGNOSIS — Z87.81 HISTORY OF VERTEBRAL COMPRESSION FRACTURE: ICD-10-CM

## 2025-04-30 DIAGNOSIS — I10 PRIMARY HYPERTENSION: Primary | ICD-10-CM

## 2025-04-30 DIAGNOSIS — E03.4 HYPOTHYROIDISM DUE TO ACQUIRED ATROPHY OF THYROID: ICD-10-CM

## 2025-04-30 DIAGNOSIS — Z79.01 ANTICOAGULATED: ICD-10-CM

## 2025-04-30 DIAGNOSIS — E78.5 DYSLIPIDEMIA: ICD-10-CM

## 2025-04-30 DIAGNOSIS — I26.99 ACUTE PULMONARY EMBOLISM WITHOUT ACUTE COR PULMONALE, UNSPECIFIED PULMONARY EMBOLISM TYPE: ICD-10-CM

## 2025-04-30 DIAGNOSIS — I82.491 ACUTE DEEP VEIN THROMBOSIS (DVT) OF OTHER SPECIFIED VEIN OF RIGHT LOWER EXTREMITY: ICD-10-CM

## 2025-04-30 PROCEDURE — 1160F RVW MEDS BY RX/DR IN RCRD: CPT | Mod: CPTII,S$GLB,, | Performed by: FAMILY MEDICINE

## 2025-04-30 PROCEDURE — 3288F FALL RISK ASSESSMENT DOCD: CPT | Mod: CPTII,S$GLB,, | Performed by: FAMILY MEDICINE

## 2025-04-30 PROCEDURE — 1159F MED LIST DOCD IN RCRD: CPT | Mod: CPTII,S$GLB,, | Performed by: FAMILY MEDICINE

## 2025-04-30 PROCEDURE — 99999 PR PBB SHADOW E&M-EST. PATIENT-LVL IV: CPT | Mod: PBBFAC,,, | Performed by: FAMILY MEDICINE

## 2025-04-30 PROCEDURE — 1125F AMNT PAIN NOTED PAIN PRSNT: CPT | Mod: CPTII,S$GLB,, | Performed by: FAMILY MEDICINE

## 2025-04-30 PROCEDURE — 1101F PT FALLS ASSESS-DOCD LE1/YR: CPT | Mod: CPTII,S$GLB,, | Performed by: FAMILY MEDICINE

## 2025-04-30 PROCEDURE — 99214 OFFICE O/P EST MOD 30 MIN: CPT | Mod: S$GLB,,, | Performed by: FAMILY MEDICINE

## 2025-04-30 PROCEDURE — 3075F SYST BP GE 130 - 139MM HG: CPT | Mod: CPTII,S$GLB,, | Performed by: FAMILY MEDICINE

## 2025-04-30 PROCEDURE — 3078F DIAST BP <80 MM HG: CPT | Mod: CPTII,S$GLB,, | Performed by: FAMILY MEDICINE

## 2025-04-30 RX ORDER — ROSUVASTATIN CALCIUM 20 MG/1
20 TABLET, COATED ORAL DAILY
Qty: 90 TABLET | Refills: 5 | Status: SHIPPED | OUTPATIENT
Start: 2025-04-30

## 2025-04-30 RX ORDER — LEVOTHYROXINE SODIUM 175 UG/1
175 TABLET ORAL DAILY
Qty: 30 TABLET | Refills: 5 | Status: SHIPPED | OUTPATIENT
Start: 2025-04-30

## 2025-04-30 RX ORDER — ALENDRONATE SODIUM TABLET 35 MG/1
35 TABLET ORAL
Qty: 4 TABLET | Refills: 5 | Status: SHIPPED | OUTPATIENT
Start: 2025-04-30 | End: 2026-04-30

## 2025-04-30 RX ORDER — VALSARTAN AND HYDROCHLOROTHIAZIDE 160; 12.5 MG/1; MG/1
1 TABLET, FILM COATED ORAL DAILY
Qty: 30 TABLET | Refills: 5 | Status: SHIPPED | OUTPATIENT
Start: 2025-04-30

## 2025-04-30 NOTE — PROGRESS NOTES
Ms. Rios is an 84 yr old female with a past medical history of DVT, hypothyroidism, cardiomyopathy, HTN, and aortic valve disease. She presents to the clinic for a 3 month check up. She states that overall she is doing well. She has left knee pain that she is seeing an orthopedic for. The knee pain is worse when performing physical activity, and subsides when resting. She is scheduled to have a nerve block help with the knee pain.     Review of Systems   Constitutional:  Negative for activity change, chills and fever.   HENT:  Negative for congestion, ear discharge, ear pain, postnasal drip, sinus pressure, sinus pain and sore throat.    Respiratory:  Negative for cough, chest tightness and shortness of breath.    Cardiovascular:  Negative for chest pain.   Gastrointestinal:  Negative for abdominal distention, abdominal pain and nausea.   Genitourinary:  Negative for dysuria, frequency and urgency.   Musculoskeletal: Negative for back pain. Positive for left knee pain.  Skin:  Negative for rash.   Neurological:  Negative for dizziness, weakness, light-headedness and numbness.   Hematological:  Does not bruise/bleed easily.     Physical Exam  Constitutional: She appears well-developed and well-nourished.   HENT:   Head: Normocephalic and atraumatic.   Eyes: Conjunctivae and EOM are normal. Pupils are equal, round, and reactive to light.   Neck: Neck supple.   Cardiovascular:  Normal rate, regular rhythm, systolic murmur, 1+ pedal pulses.           Pulmonary/Chest: Breath sounds normal.   Abdominal: Abdomen is soft. Bowel sounds are normal.   Musculoskeletal:         General: Normal range of motion. Crepitus to bilateral knees.     Cervical back: Neck supple.   Neurological: She is alert and oriented to person, place, and time. She has normal strength.   Skin: Skin is warm and dry.   Psychiatric: She has a normal mood and affect. Her behavior is normal. Judgment and thought content normal.        1. Primary  hypertension  Overview:  Diovan-HCTZ to continue. She has normal renal function. Her blood pressure is normal today. Condition stable.     Orders:  -     valsartan-hydrochlorothiazide (DIOVAN-HCT) 160-12.5 mg per tablet; Take 1 tablet by mouth once daily.  Dispense: 30 tablet; Refill: 5  -     Comprehensive Metabolic Panel; Future; Expected date: 10/01/2025    2. Acute deep vein thrombosis (DVT) of other specified vein of right lower extremity  Overview:  Right venous thrombosis of femoral vein, popliteal and peroneal veins  Continue Eliquis    Orders:  -     apixaban (ELIQUIS) 5 mg Tab; Take 1 tablet (5 mg total) by mouth 2 (two) times daily.  Dispense: 60 tablet; Refill: 5    3. Acute pulmonary embolism without acute cor pulmonale, unspecified pulmonary embolism type  Overview:  Seen on CTA chest  IV heparin gtt completed.  Started Eloquis day of discharge.    Likely due to decreased mobility but will defer further workup to cardiology  Cardiology consulted   Patient chest pain and SOB resolved    Orders:  -     apixaban (ELIQUIS) 5 mg Tab; Take 1 tablet (5 mg total) by mouth 2 (two) times daily.  Dispense: 60 tablet; Refill: 5  -     CBC Auto Differential; Future; Expected date: 10/01/2025    4. Dyslipidemia  Overview:  Continue atorvastatin        Orders:  -     rosuvastatin (CRESTOR) 20 MG tablet; Take 1 tablet (20 mg total) by mouth once daily.  Dispense: 90 tablet; Refill: 5  -     Lipid Panel; Future; Expected date: 10/01/2025    5. History of vertebral compression fracture  -     alendronate (FOSAMAX) 35 MG tablet; Take 1 tablet (35 mg total) by mouth every 7 days.  Dispense: 4 tablet; Refill: 5    6. Hypothyroidism due to acquired atrophy of thyroid  Overview:  Lab Results   Component Value Date    TSH 0.143 (L) 10/15/2024   Keep appt with Dr De Jesus  Continue Levothyroxine 175 mcg daily    Orders:  -     levothyroxine (SYNTHROID, LEVOTHROID) 175 MCG tablet; Take 1 tablet (175 mcg total) by mouth once daily.   Dispense: 30 tablet; Refill: 5  -     TSH; Future; Expected date: 10/01/2025  -     T4, Free; Future; Expected date: 10/01/2025    7. Anticoagulated  -     CBC Auto Differential; Future; Expected date: 10/01/2025     RTC in 6 months

## 2025-05-16 ENCOUNTER — HOSPITAL ENCOUNTER (OUTPATIENT)
Dept: RADIOLOGY | Facility: HOSPITAL | Age: 85
Discharge: HOME | End: 2025-05-16
Attending: PAIN MEDICINE
Payer: MEDICARE

## 2025-05-16 DIAGNOSIS — I73.9 VASCULAR CLAUDICATION: ICD-10-CM

## 2025-05-16 PROCEDURE — 93925 LOWER EXTREMITY STUDY: CPT | Mod: TC

## 2025-07-15 ENCOUNTER — HOSPITAL ENCOUNTER (EMERGENCY)
Facility: HOSPITAL | Age: 85
Discharge: HOME | End: 2025-07-15
Attending: SURGERY
Payer: MEDICARE

## 2025-07-15 VITALS
RESPIRATION RATE: 17 BRPM | DIASTOLIC BLOOD PRESSURE: 55 MMHG | TEMPERATURE: 98 F | WEIGHT: 174.81 LBS | SYSTOLIC BLOOD PRESSURE: 117 MMHG | HEIGHT: 68 IN | BODY MASS INDEX: 26.49 KG/M2 | HEART RATE: 51 BPM | OXYGEN SATURATION: 95 %

## 2025-07-15 DIAGNOSIS — M54.6 CHRONIC RIGHT-SIDED THORACIC BACK PAIN: Primary | ICD-10-CM

## 2025-07-15 DIAGNOSIS — K21.9 GASTRO-ESOPHAGEAL REFLUX DISEASE WITHOUT ESOPHAGITIS: ICD-10-CM

## 2025-07-15 DIAGNOSIS — G89.29 CHRONIC RIGHT-SIDED THORACIC BACK PAIN: Primary | ICD-10-CM

## 2025-07-15 DIAGNOSIS — R60.9 EDEMA, UNSPECIFIED: ICD-10-CM

## 2025-07-15 DIAGNOSIS — M54.9 BACK PAIN: ICD-10-CM

## 2025-07-15 LAB
ABSOLUTE EOSINOPHIL (OHS): 0.08 K/UL
ABSOLUTE MONOCYTE (OHS): 0.49 K/UL (ref 0.3–1)
ABSOLUTE NEUTROPHIL COUNT (OHS): 4.29 K/UL (ref 1.8–7.7)
ALBUMIN SERPL BCP-MCNC: 3.6 G/DL (ref 3.5–5.2)
ALP SERPL-CCNC: 53 UNIT/L (ref 40–150)
ALT SERPL W/O P-5'-P-CCNC: 14 UNIT/L (ref 10–44)
ANION GAP (OHS): 12 MMOL/L (ref 8–16)
AST SERPL-CCNC: 20 UNIT/L (ref 11–45)
BASOPHILS # BLD AUTO: 0.04 K/UL
BASOPHILS NFR BLD AUTO: 0.7 %
BILIRUB SERPL-MCNC: 0.7 MG/DL (ref 0.1–1)
BNP SERPL-MCNC: 175 PG/ML (ref 0–99)
BUN SERPL-MCNC: 20 MG/DL (ref 8–23)
CALCIUM SERPL-MCNC: 9.3 MG/DL (ref 8.7–10.5)
CHLORIDE SERPL-SCNC: 109 MMOL/L (ref 95–110)
CO2 SERPL-SCNC: 23 MMOL/L (ref 23–29)
CREAT SERPL-MCNC: 1 MG/DL (ref 0.5–1.4)
D DIMER PPP IA.FEU-MCNC: 0.35 MG/L FEU
ERYTHROCYTE [DISTWIDTH] IN BLOOD BY AUTOMATED COUNT: 12.9 % (ref 11.5–14.5)
GFR SERPLBLD CREATININE-BSD FMLA CKD-EPI: 56 ML/MIN/1.73/M2
GLUCOSE SERPL-MCNC: 98 MG/DL (ref 70–110)
HCT VFR BLD AUTO: 37.4 % (ref 37–48.5)
HGB BLD-MCNC: 12.3 GM/DL (ref 12–16)
IMM GRANULOCYTES # BLD AUTO: 0.01 K/UL (ref 0–0.04)
IMM GRANULOCYTES NFR BLD AUTO: 0.2 % (ref 0–0.5)
LIPASE SERPL-CCNC: 43 U/L (ref 4–60)
LYMPHOCYTES # BLD AUTO: 1.06 K/UL (ref 1–4.8)
MCH RBC QN AUTO: 31.7 PG (ref 27–31)
MCHC RBC AUTO-ENTMCNC: 32.9 G/DL (ref 32–36)
MCV RBC AUTO: 96 FL (ref 82–98)
NUCLEATED RBC (/100WBC) (OHS): 0 /100 WBC
OHS QRS DURATION: 144 MS
OHS QTC CALCULATION: 474 MS
PLATELET # BLD AUTO: 155 K/UL (ref 150–450)
PMV BLD AUTO: 10.9 FL (ref 9.2–12.9)
POTASSIUM SERPL-SCNC: 4.1 MMOL/L (ref 3.5–5.1)
PROT SERPL-MCNC: 7.2 GM/DL (ref 6–8.4)
RBC # BLD AUTO: 3.88 M/UL (ref 4–5.4)
RELATIVE EOSINOPHIL (OHS): 1.3 %
RELATIVE LYMPHOCYTE (OHS): 17.8 % (ref 18–48)
RELATIVE MONOCYTE (OHS): 8.2 % (ref 4–15)
RELATIVE NEUTROPHIL (OHS): 71.8 % (ref 38–73)
SODIUM SERPL-SCNC: 144 MMOL/L (ref 136–145)
TROPONIN I SERPL DL<=0.01 NG/ML-MCNC: <0.006 NG/ML
WBC # BLD AUTO: 5.97 K/UL (ref 3.9–12.7)

## 2025-07-15 PROCEDURE — 83690 ASSAY OF LIPASE: CPT | Performed by: NURSE PRACTITIONER

## 2025-07-15 PROCEDURE — 85379 FIBRIN DEGRADATION QUANT: CPT | Performed by: NURSE PRACTITIONER

## 2025-07-15 PROCEDURE — 85025 COMPLETE CBC W/AUTO DIFF WBC: CPT | Performed by: NURSE PRACTITIONER

## 2025-07-15 PROCEDURE — 84484 ASSAY OF TROPONIN QUANT: CPT | Performed by: NURSE PRACTITIONER

## 2025-07-15 PROCEDURE — 25000003 PHARM REV CODE 250: Performed by: NURSE PRACTITIONER

## 2025-07-15 PROCEDURE — 99285 EMERGENCY DEPT VISIT HI MDM: CPT | Mod: 25

## 2025-07-15 PROCEDURE — 82247 BILIRUBIN TOTAL: CPT | Performed by: NURSE PRACTITIONER

## 2025-07-15 PROCEDURE — 93010 ELECTROCARDIOGRAM REPORT: CPT | Mod: ,,, | Performed by: INTERNAL MEDICINE

## 2025-07-15 PROCEDURE — 93005 ELECTROCARDIOGRAM TRACING: CPT

## 2025-07-15 PROCEDURE — 83880 ASSAY OF NATRIURETIC PEPTIDE: CPT | Performed by: NURSE PRACTITIONER

## 2025-07-15 RX ORDER — LIDOCAINE 50 MG/G
1 PATCH TOPICAL
Status: DISCONTINUED | OUTPATIENT
Start: 2025-07-15 | End: 2025-07-15 | Stop reason: HOSPADM

## 2025-07-15 RX ORDER — METHOCARBAMOL 500 MG/1
1000 TABLET, FILM COATED ORAL 3 TIMES DAILY
Qty: 30 TABLET | Refills: 0 | Status: SHIPPED | OUTPATIENT
Start: 2025-07-15 | End: 2025-07-20

## 2025-07-15 RX ADMIN — LIDOCAINE 1 PATCH: 50 PATCH TOPICAL at 11:07

## 2025-07-15 NOTE — ED TRIAGE NOTES
Pt arrived to ED c/o constant throbbing right upper back pain that started 2 days ago. Pt denies chest pain and shortness of breath. Pt rates pain 10/10

## 2025-07-15 NOTE — ED PROVIDER NOTES
Encounter Date: 7/15/2025       History     Chief Complaint   Patient presents with    Back Pain     Pt arrived to ED c/o constant throbbing right upper back pain that started 2 days ago. Pt denies chest pain and shortness of breath. Pt rates pain 10/10      Chief complaint: Back pain   [age] year female with a history of obesity and hypothyroidism presents to be evaluated for right upper back pain she has had intermittently for several months.  Reports increased last 2 days.  Denies chest pain denies shortness or breath denies palpitations.  Denies any heavy lifting recent injury however she was more active than usual and spend more time cooking cleaning was began.  She reports she thought initially was trapped gas and has been taking over-the-counter medications with no improvement in symptoms.      Review of patient's allergies indicates:  No Known Allergies  Past Medical History:   Diagnosis Date    Hypothyroidism     Obesity      Past Surgical History:   Procedure Laterality Date    APPENDECTOMY      cervical fusion x 2      HYSTERECTOMY       Family History   Problem Relation Name Age of Onset    Thyroid nodules Father      Diabetes Sister      Nephrolithiasis Daughter      Breast cancer Daughter      Thyroid disease Paternal Uncle      Diabetes Maternal Grandmother       Social History[1]  Review of Systems   Constitutional:  Negative for fatigue and fever.   Respiratory:  Negative for chest tightness and shortness of breath.    Cardiovascular:  Negative for chest pain and palpitations.   Gastrointestinal:  Negative for abdominal pain.   Musculoskeletal:  Positive for back pain. Negative for myalgias.       Physical Exam     Initial Vitals [07/15/25 1004]   BP Pulse Resp Temp SpO2   116/69 91 17 98.1 °F (36.7 °C) (!) 94 %      MAP       --         Physical Exam    Nursing note and vitals reviewed.  Constitutional: She appears well-developed and well-nourished.   HENT:   Head: Normocephalic and  atraumatic.   Eyes: EOM are normal. Pupils are equal, round, and reactive to light.   Cardiovascular:  Normal rate and regular rhythm.           Pulmonary/Chest: Breath sounds normal. She has no wheezes. She has no rhonchi. She has no rales.     Neurological: She is alert and oriented to person, place, and time.   Skin: Skin is warm and dry.         ED Course   Procedures  Labs Reviewed   B-TYPE NATRIURETIC PEPTIDE - Abnormal       Result Value     (*)    COMPREHENSIVE METABOLIC PANEL - Abnormal    Sodium 144      Potassium 4.1      Chloride 109      CO2 23      Glucose 98      BUN 20      Creatinine 1.0      Calcium 9.3      Protein Total 7.2      Albumin 3.6      Bilirubin Total 0.7      ALP 53      AST 20      ALT 14      Anion Gap 12      eGFR 56 (*)    CBC WITH DIFFERENTIAL - Abnormal    WBC 5.97      RBC 3.88 (*)     HGB 12.3      HCT 37.4      MCV 96      MCH 31.7 (*)     MCHC 32.9      RDW 12.9      Platelet Count 155      MPV 10.9      Nucleated RBC 0      Neut % 71.8      Lymph % 17.8 (*)     Mono % 8.2      Eos % 1.3      Basophil % 0.7      Imm Grans % 0.2      Neut # 4.29      Lymph # 1.06      Mono # 0.49      Eos # 0.08      Baso # 0.04      Imm Grans # 0.01     TROPONIN I - Normal    Troponin-I <0.006     D DIMER, QUANTITATIVE - Normal    D-Dimer 0.35     LIPASE - Normal    Lipase Level 43     CBC W/ AUTO DIFFERENTIAL    Narrative:     The following orders were created for panel order Complete Blood Count (CBC).  Procedure                               Abnormality         Status                     ---------                               -----------         ------                     CBC with Differential[[id number]]       Abnormal            Final result                 Please view results for these tests on the individual orders.          Imaging Results              X-Ray Chest AP Portable (Final result)  Result time 07/15/25 11:01:08      Final result by Anna Hanson MD (07/15/25  11:01:08)                   Impression:      No acute cardiopulmonary disease.      Electronically signed by: Anna Rao Margie  Date:    07/15/2025  Time:    11:01               Narrative:    EXAMINATION:  XR CHEST AP PORTABLE    CLINICAL HISTORY:  Dorsalgia, unspecified    TECHNIQUE:  Single frontal view of the chest was performed.    COMPARISON:  CT 12/12/2024    FINDINGS:  Cardiomediastinal silhouette stable with borderline heart size.  Lungs well inflated and clear.  No pleural effusion or acute bony abnormality noted.                                       Medications   LIDOcaine 5 % patch 1 patch (has no administration in time range)     Medical Decision Making  [age] year female presents to be evaluated for exacerbation of intermittent chronic right upper back pain   These include thoracic back pain, PE, costochondritis, ACS, atypical chest pain    Amount and/or Complexity of Data Reviewed  Labs: ordered.     Details: CBC, CMP, troponin, D-dimer  Radiology: ordered.     Details: Chest x-ray  Discussion of management or test interpretation with external provider(s): Patient with tenderness to the right thoracic paraspinal muscles with no point tenderness of the spine   Negative cardiac workup in ED including negative troponin   Mildly elevated BNP   Stable chest x-ray  Will treat with supportive care for MSK pain   Stable for DC with PCP follow-up    Risk  Prescription drug management.                                          Clinical Impression:  Final diagnoses:  [M54.9] Back pain  [M54.6, G89.29] Chronic right-sided thoracic back pain (Primary)          ED Disposition Condition    Discharge Stable          ED Prescriptions       Medication Sig Dispense Start Date End Date Auth. Provider    methocarbamoL (ROBAXIN) 500 MG Tab Take 2 tablets (1,000 mg total) by mouth 3 (three) times daily. for 5 days 30 tablet 7/15/2025 7/20/2025 Andreina Saenz NP          Follow-up Information    None                 [1]   Social History  Tobacco Use    Smoking status: Never    Smokeless tobacco: Never   Substance Use Topics    Alcohol use: No    Drug use: No        Andreina Saenz NP  07/15/25 9015

## 2025-07-15 NOTE — TELEPHONE ENCOUNTER
Care Due:                  Date            Visit Type   Department     Provider  --------------------------------------------------------------------------------                                EP -                              PRIMARY      [facility] INTERNAL  Danny Mercedes  Last Visit: 04-      CARE ([facility])   [facility] ANDREW Arellano                              EP -                              PRIMARY      [facility] INTERNAL  Danny Mercedes  Next Visit: 10-      CARE ([facility])   [facility]                                                            Last  Test          Frequency    Reason                     Performed    Due Date  --------------------------------------------------------------------------------    Phosphate...  12 months..  alendronate..............  Not Found    Overdue    [employer] Embedded Care Due Messages. Reference number: [id number].   7/15/2025 12:12:36 PM CDT

## 2025-07-16 RX ORDER — OMEPRAZOLE 40 MG/1
40 CAPSULE, DELAYED RELEASE ORAL
Qty: 30 CAPSULE | Refills: 6 | Status: SHIPPED | OUTPATIENT
Start: 2025-07-16

## 2025-07-16 RX ORDER — FUROSEMIDE 20 MG/1
20 TABLET ORAL
Qty: 30 TABLET | Refills: 6 | Status: SHIPPED | OUTPATIENT
Start: 2025-07-16

## 2025-08-04 RX ORDER — APIXABAN 5 MG/1
5 TABLET, FILM COATED ORAL 2 TIMES DAILY
Qty: 60 TABLET | Refills: 1 | Status: SHIPPED | OUTPATIENT
Start: 2025-08-04

## 2025-08-04 NOTE — TELEPHONE ENCOUNTER
No care due was identified.  [facility] Embedded Care Due Messages. Reference number: [id number].   8/04/2025 9:23:09 AM CDT

## 2025-08-04 NOTE — TELEPHONE ENCOUNTER
LOV:  04/30/2025  Pt requesting refill of:     apixaban (ELIQUIS) 5 mg Tab     Medication pended     Please advise

## 2025-08-26 ENCOUNTER — OFFICE VISIT (OUTPATIENT)
Dept: CARDIOLOGY | Facility: CLINIC | Age: 85
End: 2025-08-26
Payer: MEDICARE

## 2025-08-26 VITALS
WEIGHT: 176.38 LBS | OXYGEN SATURATION: 98 % | HEIGHT: 68 IN | HEART RATE: 57 BPM | BODY MASS INDEX: 26.73 KG/M2 | DIASTOLIC BLOOD PRESSURE: 60 MMHG | SYSTOLIC BLOOD PRESSURE: 110 MMHG | RESPIRATION RATE: 18 BRPM

## 2025-08-26 DIAGNOSIS — I35.9 AORTIC VALVE DISEASE: ICD-10-CM

## 2025-08-26 DIAGNOSIS — I10 PRIMARY HYPERTENSION: Primary | ICD-10-CM

## 2025-08-26 DIAGNOSIS — I70.0 AORTIC ATHEROSCLEROSIS: ICD-10-CM

## 2025-08-26 DIAGNOSIS — I82.491 ACUTE DEEP VEIN THROMBOSIS (DVT) OF OTHER SPECIFIED VEIN OF RIGHT LOWER EXTREMITY: ICD-10-CM

## 2025-08-26 DIAGNOSIS — E78.5 DYSLIPIDEMIA: ICD-10-CM

## 2025-08-26 DIAGNOSIS — R06.09 DOE (DYSPNEA ON EXERTION): ICD-10-CM

## 2025-08-26 PROCEDURE — 3074F SYST BP LT 130 MM HG: CPT | Mod: CPTII,S$GLB,, | Performed by: INTERNAL MEDICINE

## 2025-08-26 PROCEDURE — 1160F RVW MEDS BY RX/DR IN RCRD: CPT | Mod: CPTII,S$GLB,, | Performed by: INTERNAL MEDICINE

## 2025-08-26 PROCEDURE — 3078F DIAST BP <80 MM HG: CPT | Mod: CPTII,S$GLB,, | Performed by: INTERNAL MEDICINE

## 2025-08-26 PROCEDURE — 99999 PR PBB SHADOW E&M-EST. PATIENT-LVL IV: CPT | Mod: PBBFAC,,, | Performed by: INTERNAL MEDICINE

## 2025-08-26 PROCEDURE — 1159F MED LIST DOCD IN RCRD: CPT | Mod: CPTII,S$GLB,, | Performed by: INTERNAL MEDICINE

## 2025-08-26 PROCEDURE — 99203 OFFICE O/P NEW LOW 30 MIN: CPT | Mod: S$GLB,,, | Performed by: INTERNAL MEDICINE

## 2025-09-05 DIAGNOSIS — I82.491 ACUTE DEEP VEIN THROMBOSIS (DVT) OF OTHER SPECIFIED VEIN OF RIGHT LOWER EXTREMITY: Primary | ICD-10-CM
